# Patient Record
Sex: MALE | Race: WHITE | Employment: OTHER | URBAN - METROPOLITAN AREA
[De-identification: names, ages, dates, MRNs, and addresses within clinical notes are randomized per-mention and may not be internally consistent; named-entity substitution may affect disease eponyms.]

---

## 2018-03-21 ENCOUNTER — HOSPITAL ENCOUNTER (INPATIENT)
Age: 83
LOS: 6 days | Discharge: ANOTHER HOSPITAL | DRG: 481 | End: 2018-03-27
Attending: EMERGENCY MEDICINE | Admitting: INTERNAL MEDICINE
Payer: MEDICARE

## 2018-03-21 ENCOUNTER — APPOINTMENT (OUTPATIENT)
Dept: GENERAL RADIOLOGY | Age: 83
DRG: 481 | End: 2018-03-21
Payer: MEDICARE

## 2018-03-21 DIAGNOSIS — S72.002A CLOSED FRACTURE OF LEFT HIP, INITIAL ENCOUNTER (HCC): Primary | ICD-10-CM

## 2018-03-21 LAB
ABO/RH: NORMAL
ANION GAP SERPL CALCULATED.3IONS-SCNC: 13 MMOL/L (ref 7–16)
ANTIBODY SCREEN: NORMAL
APTT: 37.4 SEC (ref 24.5–35.1)
BASOPHILS ABSOLUTE: 0.03 E9/L (ref 0–0.2)
BASOPHILS RELATIVE PERCENT: 0.4 % (ref 0–2)
BILIRUBIN URINE: NEGATIVE
BLOOD, URINE: NEGATIVE
BUN BLDV-MCNC: 39 MG/DL (ref 8–23)
CALCIUM SERPL-MCNC: 9.3 MG/DL (ref 8.6–10.2)
CHLORIDE BLD-SCNC: 100 MMOL/L (ref 98–107)
CLARITY: CLEAR
CO2: 25 MMOL/L (ref 22–29)
COLOR: YELLOW
CREAT SERPL-MCNC: 1.6 MG/DL (ref 0.7–1.2)
EOSINOPHILS ABSOLUTE: 0.11 E9/L (ref 0.05–0.5)
EOSINOPHILS RELATIVE PERCENT: 1.6 % (ref 0–6)
GFR AFRICAN AMERICAN: 49
GFR NON-AFRICAN AMERICAN: 41 ML/MIN/1.73
GLUCOSE BLD-MCNC: 181 MG/DL (ref 74–109)
GLUCOSE URINE: NEGATIVE MG/DL
HCT VFR BLD CALC: 41.7 % (ref 37–54)
HEMOGLOBIN: 13.7 G/DL (ref 12.5–16.5)
IMMATURE GRANULOCYTES #: 0.1 E9/L
IMMATURE GRANULOCYTES %: 1.5 % (ref 0–5)
INR BLD: 2.5
KETONES, URINE: NEGATIVE MG/DL
LEUKOCYTE ESTERASE, URINE: NEGATIVE
LYMPHOCYTES ABSOLUTE: 1.49 E9/L (ref 1.5–4)
LYMPHOCYTES RELATIVE PERCENT: 21.8 % (ref 20–42)
MCH RBC QN AUTO: 29 PG (ref 26–35)
MCHC RBC AUTO-ENTMCNC: 32.9 % (ref 32–34.5)
MCV RBC AUTO: 88.3 FL (ref 80–99.9)
MONOCYTES ABSOLUTE: 0.99 E9/L (ref 0.1–0.95)
MONOCYTES RELATIVE PERCENT: 14.5 % (ref 2–12)
NEUTROPHILS ABSOLUTE: 4.12 E9/L (ref 1.8–7.3)
NEUTROPHILS RELATIVE PERCENT: 60.2 % (ref 43–80)
NITRITE, URINE: NEGATIVE
PDW BLD-RTO: 13.9 FL (ref 11.5–15)
PH UA: 6 (ref 5–9)
PLATELET # BLD: 160 E9/L (ref 130–450)
PMV BLD AUTO: 11.5 FL (ref 7–12)
POTASSIUM SERPL-SCNC: 4.1 MMOL/L (ref 3.5–5)
PROTEIN UA: NEGATIVE MG/DL
PROTHROMBIN TIME: 28.1 SEC (ref 9.3–12.4)
RBC # BLD: 4.72 E12/L (ref 3.8–5.8)
SODIUM BLD-SCNC: 138 MMOL/L (ref 132–146)
SPECIFIC GRAVITY UA: 1.01 (ref 1–1.03)
UROBILINOGEN, URINE: 0.2 E.U./DL
WBC # BLD: 6.8 E9/L (ref 4.5–11.5)

## 2018-03-21 PROCEDURE — 36415 COLL VENOUS BLD VENIPUNCTURE: CPT

## 2018-03-21 PROCEDURE — 73502 X-RAY EXAM HIP UNI 2-3 VIEWS: CPT

## 2018-03-21 PROCEDURE — 81003 URINALYSIS AUTO W/O SCOPE: CPT

## 2018-03-21 PROCEDURE — 1200000000 HC SEMI PRIVATE

## 2018-03-21 PROCEDURE — 96375 TX/PRO/DX INJ NEW DRUG ADDON: CPT

## 2018-03-21 PROCEDURE — 80048 BASIC METABOLIC PNL TOTAL CA: CPT

## 2018-03-21 PROCEDURE — 86901 BLOOD TYPING SEROLOGIC RH(D): CPT

## 2018-03-21 PROCEDURE — 73501 X-RAY EXAM HIP UNI 1 VIEW: CPT

## 2018-03-21 PROCEDURE — 99284 EMERGENCY DEPT VISIT MOD MDM: CPT

## 2018-03-21 PROCEDURE — 93005 ELECTROCARDIOGRAM TRACING: CPT | Performed by: STUDENT IN AN ORGANIZED HEALTH CARE EDUCATION/TRAINING PROGRAM

## 2018-03-21 PROCEDURE — 6360000002 HC RX W HCPCS: Performed by: STUDENT IN AN ORGANIZED HEALTH CARE EDUCATION/TRAINING PROGRAM

## 2018-03-21 PROCEDURE — 96365 THER/PROPH/DIAG IV INF INIT: CPT

## 2018-03-21 PROCEDURE — 85730 THROMBOPLASTIN TIME PARTIAL: CPT

## 2018-03-21 PROCEDURE — 2580000003 HC RX 258: Performed by: STUDENT IN AN ORGANIZED HEALTH CARE EDUCATION/TRAINING PROGRAM

## 2018-03-21 PROCEDURE — 85610 PROTHROMBIN TIME: CPT

## 2018-03-21 PROCEDURE — 87088 URINE BACTERIA CULTURE: CPT

## 2018-03-21 PROCEDURE — 71045 X-RAY EXAM CHEST 1 VIEW: CPT

## 2018-03-21 PROCEDURE — 85025 COMPLETE CBC W/AUTO DIFF WBC: CPT

## 2018-03-21 PROCEDURE — 86850 RBC ANTIBODY SCREEN: CPT

## 2018-03-21 PROCEDURE — 86900 BLOOD TYPING SEROLOGIC ABO: CPT

## 2018-03-21 RX ORDER — FUROSEMIDE 40 MG/1
40 TABLET ORAL DAILY
COMMUNITY

## 2018-03-21 RX ORDER — GLIPIZIDE 2.5 MG/1
5 TABLET, EXTENDED RELEASE ORAL DAILY
Status: ON HOLD | COMMUNITY
End: 2018-06-30

## 2018-03-21 RX ORDER — NIFEDIPINE 30 MG/1
30 TABLET, FILM COATED, EXTENDED RELEASE ORAL DAILY
Status: ON HOLD | COMMUNITY
End: 2018-03-28 | Stop reason: HOSPADM

## 2018-03-21 RX ORDER — MORPHINE SULFATE 10 MG/ML
4 INJECTION, SOLUTION INTRAMUSCULAR; INTRAVENOUS
Status: DISCONTINUED | OUTPATIENT
Start: 2018-03-21 | End: 2018-03-22

## 2018-03-21 RX ADMIN — MORPHINE SULFATE 4 MG: 10 INJECTION INTRAVENOUS at 21:38

## 2018-03-21 RX ADMIN — PHYTONADIONE 10 MG: 10 INJECTION, EMULSION INTRAMUSCULAR; INTRAVENOUS; SUBCUTANEOUS at 22:34

## 2018-03-21 ASSESSMENT — PAIN SCALES - GENERAL
PAINLEVEL_OUTOF10: 7
PAINLEVEL_OUTOF10: 8

## 2018-03-21 ASSESSMENT — PAIN DESCRIPTION - PAIN TYPE: TYPE: ACUTE PAIN

## 2018-03-21 ASSESSMENT — PAIN DESCRIPTION - DESCRIPTORS: DESCRIPTORS: ACHING

## 2018-03-21 ASSESSMENT — PAIN DESCRIPTION - ORIENTATION: ORIENTATION: LEFT

## 2018-03-21 ASSESSMENT — PAIN DESCRIPTION - LOCATION: LOCATION: HIP

## 2018-03-21 NOTE — ED PROVIDER NOTES
ED Attending  CC: Ellen     Department of Emergency Medicine   ED  Provider Note  Admit Date/RoomTime: 3/21/2018  7:25 PM  ED Room: 25/25  Chief Complaint   Hip Pain (fell today and hurt left hip.)    History of Present Illness   Source of history provided by:  patient. History/Exam Limitations: none. Diane Bennett is a 80 y.o. old male who has a past medical history of:   Past Medical History:   Diagnosis Date    A-fib [facility])     Arthritis     CAD (coronary artery disease)     [facility] ([facility] [address] [facility]     Depression     Hyperlipidemia     Hypertension     Thyroid disease       presents to the emergency department by private vehicle, for a fall which occured just prior to arrival. He was reportedly walking in his bathroom when he got his slipper caught on the carpet while at home prior to incident with complaints of right hip/groin pain. The patients tetanus status is within past 5 years. Since onset the symptoms have been moderate in degree. His pain is aggraveated by movement, use and palpation and relieved by nothing. He denies any head injury, loss of consciousness, neck pain, chest pain, abdominal pain, numbness or weakness. The patient does not believe that he has never seen an orthopedic surgeon before. The patient is on Coumadin anticoagulation therapy. ROS    Pertinent positives and negatives are stated within HPI, all other systems reviewed and are negative. Past Surgical History:   Procedure Laterality Date    HERNIA REPAIR      KNEE ARTHROSCOPY Left 7-1-13    debridement and medial menisectomy    PROSTATE SURGERY     Social History:  reports that he has quit smoking. He has never used smokeless tobacco. He reports that he does not drink alcohol or use drugs. Family History: family history is not on file. Allergies: Patient has no known allergies.     Physical Exam   Oxygen Saturation Interpretation: Normal.  ED Triage Vitals [03/21/18 1925]   BP Temp Temp Source Pulse Resp SpO2 Height Weight   -- 98.6 °F (37 °C) Oral 82 16 95 % 6' 1\" (1.854 m) 197 lb (89.4 kg)     Physical Exam  · Constitutional:  Alert, development consistent with age. · HEENT:  NC/NT. Airway patent. · Neck:  No midline or paravertebral tenderness. Normal ROM. Supple. · Chest:  Symmetrical without visible rash or tenderness. · Respiratory:  Lungs Clear to auscultation and breath sounds equal.  · CV:  Regular rate and rhythm, normal heart sounds, without pathological murmurs, ectopy, gallops, or rubs. · GI:  Abdomen Soft, nontender, good bowel sounds. No firm or pulsatile mass. · Pelvis:  Stable, palpation elicits pain of the left hip. · Back:  No midline or paravertebral tenderness. No costovertebral tenderness. · Extremities: Severe tenderness to palpation of the left hip. The left lower extremity is shortened and externally rotated. · Integument:  Normal turgor. Warm, dry, without visible rash, unless noted elsewhere. · Lymphatic: no lymphadenopathy noted  · Neurological:  Oriented x3, GCS 15. Motor functions intact.      Lab / Imaging Results   (All laboratory and radiology results have been personally reviewed by myself)  Labs:  Results for orders placed or performed during the hospital encounter of 03/21/18   CBC Auto Differential   Result Value Ref Range    WBC 6.8 4.5 - 11.5 E9/L    RBC 4.72 3.80 - 5.80 E12/L    Hemoglobin 13.7 12.5 - 16.5 g/dL    Hematocrit 41.7 37.0 - 54.0 %    MCV 88.3 80.0 - 99.9 fL    MCH 29.0 26.0 - 35.0 pg    MCHC 32.9 32.0 - 34.5 %    RDW 13.9 11.5 - 15.0 fL    Platelets 660 879 - 179 E9/L    MPV 11.5 7.0 - 12.0 fL    Neutrophils % 60.2 43.0 - 80.0 %    Immature Granulocytes % 1.5 0.0 - 5.0 %    Lymphocytes % 21.8 20.0 - 42.0 %    Monocytes % 14.5 (H) 2.0 - 12.0 %    Eosinophils % 1.6 0.0 - 6.0 %    Basophils % 0.4 0.0 - 2.0 %    Neutrophils # 4.12 1.80 - 7.30 E9/L    Immature Granulocytes # 0.10 E9/L    Lymphocytes # 1.49 (L) 1.50 - 4.00 E9/L    Monocytes # 0.99 (H) 0.10 - 0.95 accuracy; however, inadvertent computerized transcription errors may be present.   END OF ED PROVIDER NOTE        Emory Linares, DO  03/21/18 [id number] [facility], DO  03/21/18 9921

## 2018-03-21 NOTE — ED NOTES
Bed: 25  Expected date:   Expected time:   Means of arrival:   Comments:  Errol Baker RN  03/21/18 1925

## 2018-03-22 ENCOUNTER — APPOINTMENT (OUTPATIENT)
Dept: GENERAL RADIOLOGY | Age: 83
DRG: 481 | End: 2018-03-22
Payer: MEDICARE

## 2018-03-22 ENCOUNTER — ANESTHESIA (OUTPATIENT)
Dept: OPERATING ROOM | Age: 83
DRG: 481 | End: 2018-03-22
Payer: MEDICARE

## 2018-03-22 ENCOUNTER — ANESTHESIA EVENT (OUTPATIENT)
Dept: OPERATING ROOM | Age: 83
DRG: 481 | End: 2018-03-22
Payer: MEDICARE

## 2018-03-22 VITALS
RESPIRATION RATE: 35 BRPM | OXYGEN SATURATION: 99 % | TEMPERATURE: 97.5 F | SYSTOLIC BLOOD PRESSURE: 99 MMHG | DIASTOLIC BLOOD PRESSURE: 71 MMHG

## 2018-03-22 LAB
ALBUMIN SERPL-MCNC: 4.1 G/DL (ref 3.5–5.2)
ALP BLD-CCNC: 82 U/L (ref 40–129)
ALT SERPL-CCNC: 11 U/L (ref 0–40)
ANION GAP SERPL CALCULATED.3IONS-SCNC: 14 MMOL/L (ref 7–16)
AST SERPL-CCNC: 16 U/L (ref 0–39)
BASOPHILS ABSOLUTE: 0.03 E9/L (ref 0–0.2)
BASOPHILS RELATIVE PERCENT: 0.3 % (ref 0–2)
BILIRUB SERPL-MCNC: 0.6 MG/DL (ref 0–1.2)
BUN BLDV-MCNC: 37 MG/DL (ref 8–23)
CALCIUM SERPL-MCNC: 9.2 MG/DL (ref 8.6–10.2)
CHLORIDE BLD-SCNC: 100 MMOL/L (ref 98–107)
CK MB: 1.6 NG/ML (ref 0–7.7)
CO2: 24 MMOL/L (ref 22–29)
CREAT SERPL-MCNC: 1.4 MG/DL (ref 0.7–1.2)
EKG ATRIAL RATE: 105 BPM
EKG Q-T INTERVAL: 362 MS
EKG QRS DURATION: 82 MS
EKG QTC CALCULATION (BAZETT): 452 MS
EKG R AXIS: 34 DEGREES
EKG T AXIS: 35 DEGREES
EKG VENTRICULAR RATE: 94 BPM
EOSINOPHILS ABSOLUTE: 0.01 E9/L (ref 0.05–0.5)
EOSINOPHILS RELATIVE PERCENT: 0.1 % (ref 0–6)
GFR AFRICAN AMERICAN: 57
GFR NON-AFRICAN AMERICAN: 47 ML/MIN/1.73
GLUCOSE BLD-MCNC: 233 MG/DL (ref 74–109)
HCT VFR BLD CALC: 37.8 % (ref 37–54)
HEMOGLOBIN: 12.4 G/DL (ref 12.5–16.5)
IMMATURE GRANULOCYTES #: 0.15 E9/L
IMMATURE GRANULOCYTES %: 1.3 % (ref 0–5)
INR BLD: 1.2
INR BLD: 1.7
LYMPHOCYTES ABSOLUTE: 1.24 E9/L (ref 1.5–4)
LYMPHOCYTES RELATIVE PERCENT: 10.5 % (ref 20–42)
MCH RBC QN AUTO: 29.2 PG (ref 26–35)
MCHC RBC AUTO-ENTMCNC: 32.8 % (ref 32–34.5)
MCV RBC AUTO: 89.2 FL (ref 80–99.9)
METER GLUCOSE: 175 MG/DL (ref 70–110)
METER GLUCOSE: 186 MG/DL (ref 70–110)
METER GLUCOSE: 192 MG/DL (ref 70–110)
METER GLUCOSE: 206 MG/DL (ref 70–110)
METER GLUCOSE: 241 MG/DL (ref 70–110)
MONOCYTES ABSOLUTE: 1.49 E9/L (ref 0.1–0.95)
MONOCYTES RELATIVE PERCENT: 12.6 % (ref 2–12)
NEUTROPHILS ABSOLUTE: 8.89 E9/L (ref 1.8–7.3)
NEUTROPHILS RELATIVE PERCENT: 75.2 % (ref 43–80)
PDW BLD-RTO: 14.2 FL (ref 11.5–15)
PLATELET # BLD: 154 E9/L (ref 130–450)
PMV BLD AUTO: 12.3 FL (ref 7–12)
POTASSIUM SERPL-SCNC: 4.2 MMOL/L (ref 3.5–5)
PROTHROMBIN TIME: 14 SEC (ref 9.3–12.4)
PROTHROMBIN TIME: 19 SEC (ref 9.3–12.4)
RBC # BLD: 4.24 E12/L (ref 3.8–5.8)
SODIUM BLD-SCNC: 138 MMOL/L (ref 132–146)
TOTAL CK: 101 U/L (ref 20–200)
TOTAL PROTEIN: 7.1 G/DL (ref 6.4–8.3)
TROPONIN: <0.01 NG/ML (ref 0–0.03)
WBC # BLD: 11.8 E9/L (ref 4.5–11.5)

## 2018-03-22 PROCEDURE — 82553 CREATINE MB FRACTION: CPT

## 2018-03-22 PROCEDURE — 82962 GLUCOSE BLOOD TEST: CPT

## 2018-03-22 PROCEDURE — 2580000003 HC RX 258: Performed by: NURSE ANESTHETIST, CERTIFIED REGISTERED

## 2018-03-22 PROCEDURE — 6360000002 HC RX W HCPCS

## 2018-03-22 PROCEDURE — 3209999900 FLUORO FOR SURGICAL PROCEDURES

## 2018-03-22 PROCEDURE — 6360000002 HC RX W HCPCS: Performed by: ANESTHESIOLOGY

## 2018-03-22 PROCEDURE — 2580000003 HC RX 258: Performed by: INTERNAL MEDICINE

## 2018-03-22 PROCEDURE — 2500000003 HC RX 250 WO HCPCS: Performed by: NURSE ANESTHETIST, CERTIFIED REGISTERED

## 2018-03-22 PROCEDURE — 3600000012 HC SURGERY LEVEL 2 ADDTL 15MIN: Performed by: ORTHOPAEDIC SURGERY

## 2018-03-22 PROCEDURE — 6370000000 HC RX 637 (ALT 250 FOR IP): Performed by: INTERNAL MEDICINE

## 2018-03-22 PROCEDURE — 3700000001 HC ADD 15 MINUTES (ANESTHESIA): Performed by: ORTHOPAEDIC SURGERY

## 2018-03-22 PROCEDURE — 0QS736Z REPOSITION LEFT UPPER FEMUR WITH INTRAMEDULLARY INTERNAL FIXATION DEVICE, PERCUTANEOUS APPROACH: ICD-10-PCS | Performed by: ORTHOPAEDIC SURGERY

## 2018-03-22 PROCEDURE — 36415 COLL VENOUS BLD VENIPUNCTURE: CPT

## 2018-03-22 PROCEDURE — 85025 COMPLETE CBC W/AUTO DIFF WBC: CPT

## 2018-03-22 PROCEDURE — 85610 PROTHROMBIN TIME: CPT

## 2018-03-22 PROCEDURE — 2580000003 HC RX 258: Performed by: ORTHOPAEDIC SURGERY

## 2018-03-22 PROCEDURE — C1769 GUIDE WIRE: HCPCS | Performed by: ORTHOPAEDIC SURGERY

## 2018-03-22 PROCEDURE — 84484 ASSAY OF TROPONIN QUANT: CPT

## 2018-03-22 PROCEDURE — 93005 ELECTROCARDIOGRAM TRACING: CPT | Performed by: INTERNAL MEDICINE

## 2018-03-22 PROCEDURE — 6360000002 HC RX W HCPCS: Performed by: STUDENT IN AN ORGANIZED HEALTH CARE EDUCATION/TRAINING PROGRAM

## 2018-03-22 PROCEDURE — 82550 ASSAY OF CK (CPK): CPT

## 2018-03-22 PROCEDURE — 2500000003 HC RX 250 WO HCPCS

## 2018-03-22 PROCEDURE — 2720000010 HC SURG SUPPLY STERILE: Performed by: ORTHOPAEDIC SURGERY

## 2018-03-22 PROCEDURE — 6370000000 HC RX 637 (ALT 250 FOR IP): Performed by: ORTHOPAEDIC SURGERY

## 2018-03-22 PROCEDURE — 3700000000 HC ANESTHESIA ATTENDED CARE: Performed by: ORTHOPAEDIC SURGERY

## 2018-03-22 PROCEDURE — 3600000002 HC SURGERY LEVEL 2 BASE: Performed by: ORTHOPAEDIC SURGERY

## 2018-03-22 PROCEDURE — 1200000000 HC SEMI PRIVATE

## 2018-03-22 PROCEDURE — C1713 ANCHOR/SCREW BN/BN,TIS/BN: HCPCS | Performed by: ORTHOPAEDIC SURGERY

## 2018-03-22 PROCEDURE — 7100000001 HC PACU RECOVERY - ADDTL 15 MIN: Performed by: ORTHOPAEDIC SURGERY

## 2018-03-22 PROCEDURE — 2580000003 HC RX 258: Performed by: STUDENT IN AN ORGANIZED HEALTH CARE EDUCATION/TRAINING PROGRAM

## 2018-03-22 PROCEDURE — 6360000002 HC RX W HCPCS: Performed by: NURSE ANESTHETIST, CERTIFIED REGISTERED

## 2018-03-22 PROCEDURE — 7100000000 HC PACU RECOVERY - FIRST 15 MIN: Performed by: ORTHOPAEDIC SURGERY

## 2018-03-22 PROCEDURE — 80053 COMPREHEN METABOLIC PANEL: CPT

## 2018-03-22 PROCEDURE — 6360000002 HC RX W HCPCS: Performed by: ORTHOPAEDIC SURGERY

## 2018-03-22 DEVICE — SCREW BNE L40MM DIA5MM ST TIB LT GRN TI ST CANN LOK FULL: Type: IMPLANTABLE DEVICE | Site: HIP | Status: FUNCTIONAL

## 2018-03-22 DEVICE — IMPLANTABLE DEVICE: Type: IMPLANTABLE DEVICE | Site: HIP | Status: FUNCTIONAL

## 2018-03-22 DEVICE — NAIL IM L170MM DIA10MM NK 125DEG SHT PROX FEM GRN TI-15MO: Type: IMPLANTABLE DEVICE | Site: HIP | Status: FUNCTIONAL

## 2018-03-22 RX ORDER — ONDANSETRON 2 MG/ML
4 INJECTION INTRAMUSCULAR; INTRAVENOUS
Status: DISCONTINUED | OUTPATIENT
Start: 2018-03-22 | End: 2018-03-22

## 2018-03-22 RX ORDER — HYDROCODONE BITARTRATE AND ACETAMINOPHEN 5; 325 MG/1; MG/1
1 TABLET ORAL EVERY 6 HOURS PRN
Qty: 28 TABLET | Refills: 0 | Status: SHIPPED | OUTPATIENT
Start: 2018-03-22 | End: 2018-03-29

## 2018-03-22 RX ORDER — ROCURONIUM BROMIDE 10 MG/ML
INJECTION, SOLUTION INTRAVENOUS PRN
Status: DISCONTINUED | OUTPATIENT
Start: 2018-03-22 | End: 2018-03-22 | Stop reason: SDUPTHER

## 2018-03-22 RX ORDER — SODIUM CHLORIDE 0.9 % (FLUSH) 0.9 %
10 SYRINGE (ML) INJECTION EVERY 12 HOURS SCHEDULED
Status: DISCONTINUED | OUTPATIENT
Start: 2018-03-22 | End: 2018-03-24

## 2018-03-22 RX ORDER — PROPOFOL 10 MG/ML
INJECTION, EMULSION INTRAVENOUS PRN
Status: DISCONTINUED | OUTPATIENT
Start: 2018-03-22 | End: 2018-03-22 | Stop reason: SDUPTHER

## 2018-03-22 RX ORDER — ONDANSETRON 2 MG/ML
4 INJECTION INTRAMUSCULAR; INTRAVENOUS EVERY 6 HOURS PRN
Status: DISCONTINUED | OUTPATIENT
Start: 2018-03-22 | End: 2018-03-27 | Stop reason: HOSPADM

## 2018-03-22 RX ORDER — SODIUM CHLORIDE 0.9 % (FLUSH) 0.9 %
10 SYRINGE (ML) INJECTION PRN
Status: DISCONTINUED | OUTPATIENT
Start: 2018-03-22 | End: 2018-03-24

## 2018-03-22 RX ORDER — GLYCOPYRROLATE 0.6MG/3ML
SYRINGE (ML) INTRAVENOUS PRN
Status: DISCONTINUED | OUTPATIENT
Start: 2018-03-22 | End: 2018-03-22 | Stop reason: SDUPTHER

## 2018-03-22 RX ORDER — ACETAMINOPHEN 325 MG/1
650 TABLET ORAL EVERY 4 HOURS PRN
Status: DISCONTINUED | OUTPATIENT
Start: 2018-03-22 | End: 2018-03-24

## 2018-03-22 RX ORDER — TAMSULOSIN HYDROCHLORIDE 0.4 MG/1
0.4 CAPSULE ORAL DAILY
Status: DISCONTINUED | OUTPATIENT
Start: 2018-03-22 | End: 2018-03-24

## 2018-03-22 RX ORDER — PROMETHAZINE HYDROCHLORIDE 25 MG/ML
6.25 INJECTION, SOLUTION INTRAMUSCULAR; INTRAVENOUS
Status: DISCONTINUED | OUTPATIENT
Start: 2018-03-22 | End: 2018-03-22

## 2018-03-22 RX ORDER — SIMVASTATIN 40 MG
40 TABLET ORAL NIGHTLY
Status: DISCONTINUED | OUTPATIENT
Start: 2018-03-22 | End: 2018-03-27 | Stop reason: HOSPADM

## 2018-03-22 RX ORDER — MEPERIDINE HYDROCHLORIDE 25 MG/ML
12.5 INJECTION INTRAMUSCULAR; INTRAVENOUS; SUBCUTANEOUS EVERY 5 MIN PRN
Status: DISCONTINUED | OUTPATIENT
Start: 2018-03-22 | End: 2018-03-22

## 2018-03-22 RX ORDER — SODIUM CHLORIDE 9 MG/ML
INJECTION, SOLUTION INTRAVENOUS CONTINUOUS PRN
Status: DISCONTINUED | OUTPATIENT
Start: 2018-03-22 | End: 2018-03-22 | Stop reason: SDUPTHER

## 2018-03-22 RX ORDER — MORPHINE SULFATE 2 MG/ML
2 INJECTION, SOLUTION INTRAMUSCULAR; INTRAVENOUS EVERY 5 MIN PRN
Status: DISCONTINUED | OUTPATIENT
Start: 2018-03-22 | End: 2018-03-22

## 2018-03-22 RX ORDER — HYDROMORPHONE HCL 110MG/55ML
0.5 PATIENT CONTROLLED ANALGESIA SYRINGE INTRAVENOUS EVERY 5 MIN PRN
Status: DISCONTINUED | OUTPATIENT
Start: 2018-03-22 | End: 2018-03-22

## 2018-03-22 RX ORDER — MORPHINE SULFATE 2 MG/ML
2 INJECTION, SOLUTION INTRAMUSCULAR; INTRAVENOUS
Status: DISCONTINUED | OUTPATIENT
Start: 2018-03-22 | End: 2018-03-23

## 2018-03-22 RX ORDER — HYDROCODONE BITARTRATE AND ACETAMINOPHEN 5; 325 MG/1; MG/1
2 TABLET ORAL EVERY 4 HOURS PRN
Status: DISCONTINUED | OUTPATIENT
Start: 2018-03-22 | End: 2018-03-24

## 2018-03-22 RX ORDER — LEVOTHYROXINE SODIUM 0.1 MG/1
100 TABLET ORAL DAILY
Status: DISCONTINUED | OUTPATIENT
Start: 2018-03-22 | End: 2018-03-27 | Stop reason: HOSPADM

## 2018-03-22 RX ORDER — DEXTROSE AND SODIUM CHLORIDE 5; .45 G/100ML; G/100ML
INJECTION, SOLUTION INTRAVENOUS CONTINUOUS
Status: DISCONTINUED | OUTPATIENT
Start: 2018-03-22 | End: 2018-03-24

## 2018-03-22 RX ORDER — ACETAMINOPHEN 325 MG/1
650 TABLET ORAL EVERY 4 HOURS PRN
Status: DISCONTINUED | OUTPATIENT
Start: 2018-03-22 | End: 2018-03-27 | Stop reason: HOSPADM

## 2018-03-22 RX ORDER — MORPHINE SULFATE 2 MG/ML
4 INJECTION, SOLUTION INTRAMUSCULAR; INTRAVENOUS
Status: DISCONTINUED | OUTPATIENT
Start: 2018-03-22 | End: 2018-03-23

## 2018-03-22 RX ORDER — NIFEDIPINE 30 MG/1
30 TABLET, EXTENDED RELEASE ORAL DAILY
Status: DISCONTINUED | OUTPATIENT
Start: 2018-03-22 | End: 2018-03-27 | Stop reason: HOSPADM

## 2018-03-22 RX ORDER — DOCUSATE SODIUM 100 MG/1
100 CAPSULE, LIQUID FILLED ORAL 2 TIMES DAILY
Status: DISCONTINUED | OUTPATIENT
Start: 2018-03-22 | End: 2018-03-27 | Stop reason: HOSPADM

## 2018-03-22 RX ORDER — MORPHINE SULFATE 2 MG/ML
INJECTION, SOLUTION INTRAMUSCULAR; INTRAVENOUS
Status: COMPLETED
Start: 2018-03-22 | End: 2018-03-22

## 2018-03-22 RX ORDER — FUROSEMIDE 40 MG/1
40 TABLET ORAL DAILY
Status: DISCONTINUED | OUTPATIENT
Start: 2018-03-22 | End: 2018-03-27 | Stop reason: HOSPADM

## 2018-03-22 RX ORDER — LIDOCAINE HYDROCHLORIDE 20 MG/ML
INJECTION, SOLUTION INFILTRATION; PERINEURAL PRN
Status: DISCONTINUED | OUTPATIENT
Start: 2018-03-22 | End: 2018-03-22 | Stop reason: SDUPTHER

## 2018-03-22 RX ORDER — FINASTERIDE 5 MG/1
5 TABLET, FILM COATED ORAL DAILY
Status: DISCONTINUED | OUTPATIENT
Start: 2018-03-22 | End: 2018-03-27 | Stop reason: HOSPADM

## 2018-03-22 RX ORDER — ATENOLOL 50 MG/1
50 TABLET ORAL NIGHTLY
Status: DISCONTINUED | OUTPATIENT
Start: 2018-03-22 | End: 2018-03-23

## 2018-03-22 RX ORDER — HYDROCODONE BITARTRATE AND ACETAMINOPHEN 5; 325 MG/1; MG/1
1 TABLET ORAL EVERY 4 HOURS PRN
Status: DISCONTINUED | OUTPATIENT
Start: 2018-03-22 | End: 2018-03-27 | Stop reason: HOSPADM

## 2018-03-22 RX ORDER — FENTANYL CITRATE 50 UG/ML
INJECTION, SOLUTION INTRAMUSCULAR; INTRAVENOUS
Status: COMPLETED
Start: 2018-03-22 | End: 2018-03-22

## 2018-03-22 RX ORDER — FENTANYL CITRATE 0.05 MG/ML
INJECTION, SOLUTION INTRAMUSCULAR; INTRAVENOUS PRN
Status: DISCONTINUED | OUTPATIENT
Start: 2018-03-22 | End: 2018-03-22 | Stop reason: SDUPTHER

## 2018-03-22 RX ORDER — MORPHINE SULFATE 2 MG/ML
INJECTION, SOLUTION INTRAMUSCULAR; INTRAVENOUS
Status: DISPENSED
Start: 2018-03-22 | End: 2018-03-23

## 2018-03-22 RX ADMIN — HYDROCODONE BITARTRATE AND ACETAMINOPHEN 1 TABLET: 5; 325 TABLET ORAL at 21:26

## 2018-03-22 RX ADMIN — PHENYLEPHRINE HYDROCHLORIDE 200 MCG: 10 INJECTION INTRAMUSCULAR; INTRAVENOUS; SUBCUTANEOUS at 15:10

## 2018-03-22 RX ADMIN — VITAMIN D, TAB 1000IU (100/BT) 1000 UNITS: 25 TAB at 09:58

## 2018-03-22 RX ADMIN — WATER 2 G: 1 INJECTION INTRAMUSCULAR; INTRAVENOUS; SUBCUTANEOUS at 21:25

## 2018-03-22 RX ADMIN — SIMVASTATIN 40 MG: 40 TABLET, FILM COATED ORAL at 21:26

## 2018-03-22 RX ADMIN — FINASTERIDE 5 MG: 5 TABLET, FILM COATED ORAL at 09:57

## 2018-03-22 RX ADMIN — PHENYLEPHRINE HYDROCHLORIDE 200 MCG: 10 INJECTION INTRAMUSCULAR; INTRAVENOUS; SUBCUTANEOUS at 15:30

## 2018-03-22 RX ADMIN — LIDOCAINE HYDROCHLORIDE 50 MG: 20 INJECTION, SOLUTION INFILTRATION; PERINEURAL at 13:44

## 2018-03-22 RX ADMIN — ATENOLOL 50 MG: 50 TABLET ORAL at 21:26

## 2018-03-22 RX ADMIN — PHENYLEPHRINE HYDROCHLORIDE 200 MCG: 10 INJECTION INTRAMUSCULAR; INTRAVENOUS; SUBCUTANEOUS at 14:17

## 2018-03-22 RX ADMIN — WATER 2 G: 1 INJECTION INTRAMUSCULAR; INTRAVENOUS; SUBCUTANEOUS at 13:39

## 2018-03-22 RX ADMIN — LEVOTHYROXINE SODIUM 100 MCG: 100 TABLET ORAL at 08:07

## 2018-03-22 RX ADMIN — SODIUM CHLORIDE: 9 INJECTION, SOLUTION INTRAVENOUS at 14:45

## 2018-03-22 RX ADMIN — Medication 10 ML: at 21:28

## 2018-03-22 RX ADMIN — ROCURONIUM BROMIDE 35 MG: 10 INJECTION, SOLUTION INTRAVENOUS at 13:44

## 2018-03-22 RX ADMIN — FENTANYL CITRATE 50 MCG: 0.05 INJECTION, SOLUTION INTRAMUSCULAR; INTRAVENOUS at 13:39

## 2018-03-22 RX ADMIN — PHENYLEPHRINE HYDROCHLORIDE 100 MCG: 10 INJECTION INTRAMUSCULAR; INTRAVENOUS; SUBCUTANEOUS at 13:50

## 2018-03-22 RX ADMIN — NIFEDIPINE 30 MG: 30 TABLET, FILM COATED, EXTENDED RELEASE ORAL at 09:57

## 2018-03-22 RX ADMIN — PHENYLEPHRINE HYDROCHLORIDE 200 MCG: 10 INJECTION INTRAMUSCULAR; INTRAVENOUS; SUBCUTANEOUS at 14:30

## 2018-03-22 RX ADMIN — Medication 0.4 MG: at 15:28

## 2018-03-22 RX ADMIN — FENTANYL CITRATE 50 MCG: 0.05 INJECTION, SOLUTION INTRAMUSCULAR; INTRAVENOUS at 14:55

## 2018-03-22 RX ADMIN — SODIUM CHLORIDE: 9 INJECTION, SOLUTION INTRAVENOUS at 13:39

## 2018-03-22 RX ADMIN — PROPOFOL 100 MG: 10 INJECTION, EMULSION INTRAVENOUS at 13:44

## 2018-03-22 RX ADMIN — PHENYLEPHRINE HYDROCHLORIDE 200 MCG: 10 INJECTION INTRAMUSCULAR; INTRAVENOUS; SUBCUTANEOUS at 14:25

## 2018-03-22 RX ADMIN — PHENYLEPHRINE HYDROCHLORIDE 200 MCG: 10 INJECTION INTRAMUSCULAR; INTRAVENOUS; SUBCUTANEOUS at 15:20

## 2018-03-22 RX ADMIN — INSULIN LISPRO 1 UNITS: 100 INJECTION, SOLUTION INTRAVENOUS; SUBCUTANEOUS at 11:26

## 2018-03-22 RX ADMIN — FENTANYL CITRATE 50 MCG: 0.05 INJECTION, SOLUTION INTRAMUSCULAR; INTRAVENOUS at 15:35

## 2018-03-22 RX ADMIN — PHENYLEPHRINE HYDROCHLORIDE 200 MCG: 10 INJECTION INTRAMUSCULAR; INTRAVENOUS; SUBCUTANEOUS at 15:16

## 2018-03-22 RX ADMIN — PHENYLEPHRINE HYDROCHLORIDE 100 MCG: 10 INJECTION INTRAMUSCULAR; INTRAVENOUS; SUBCUTANEOUS at 14:20

## 2018-03-22 RX ADMIN — INSULIN LISPRO 1 UNITS: 100 INJECTION, SOLUTION INTRAVENOUS; SUBCUTANEOUS at 21:28

## 2018-03-22 RX ADMIN — DEXTROSE AND SODIUM CHLORIDE: 5; 450 INJECTION, SOLUTION INTRAVENOUS at 02:54

## 2018-03-22 RX ADMIN — ROCURONIUM BROMIDE 15 MG: 10 INJECTION, SOLUTION INTRAVENOUS at 14:35

## 2018-03-22 RX ADMIN — INSULIN LISPRO 1 UNITS: 100 INJECTION, SOLUTION INTRAVENOUS; SUBCUTANEOUS at 02:58

## 2018-03-22 RX ADMIN — PHENYLEPHRINE HYDROCHLORIDE 200 MCG: 10 INJECTION INTRAMUSCULAR; INTRAVENOUS; SUBCUTANEOUS at 13:52

## 2018-03-22 RX ADMIN — FENTANYL CITRATE 50 MCG: 0.05 INJECTION, SOLUTION INTRAMUSCULAR; INTRAVENOUS at 13:44

## 2018-03-22 RX ADMIN — DOCUSATE SODIUM 100 MG: 100 CAPSULE, LIQUID FILLED ORAL at 21:25

## 2018-03-22 RX ADMIN — MORPHINE SULFATE 2 MG: 2 INJECTION, SOLUTION INTRAMUSCULAR; INTRAVENOUS at 16:10

## 2018-03-22 RX ADMIN — MORPHINE SULFATE 2 MG: 2 INJECTION, SOLUTION INTRAMUSCULAR; INTRAVENOUS at 16:05

## 2018-03-22 RX ADMIN — PHENYLEPHRINE HYDROCHLORIDE 200 MCG: 10 INJECTION INTRAMUSCULAR; INTRAVENOUS; SUBCUTANEOUS at 15:25

## 2018-03-22 RX ADMIN — INSULIN LISPRO 2 UNITS: 100 INJECTION, SOLUTION INTRAVENOUS; SUBCUTANEOUS at 07:12

## 2018-03-22 RX ADMIN — PHENYLEPHRINE HYDROCHLORIDE 200 MCG: 10 INJECTION INTRAMUSCULAR; INTRAVENOUS; SUBCUTANEOUS at 15:35

## 2018-03-22 RX ADMIN — MORPHINE SULFATE 2 MG: 2 INJECTION, SOLUTION INTRAMUSCULAR; INTRAVENOUS at 16:34

## 2018-03-22 RX ADMIN — FUROSEMIDE 40 MG: 40 TABLET ORAL at 09:57

## 2018-03-22 RX ADMIN — NEOSTIGMINE METHYLSULFATE 3 MG: 1 INJECTION, SOLUTION INTRAMUSCULAR; INTRAVENOUS; SUBCUTANEOUS at 15:28

## 2018-03-22 RX ADMIN — TAMSULOSIN HYDROCHLORIDE 0.4 MG: 0.4 CAPSULE ORAL at 09:57

## 2018-03-22 RX ADMIN — INSULIN LISPRO 2 UNITS: 100 INJECTION, SOLUTION INTRAVENOUS; SUBCUTANEOUS at 17:33

## 2018-03-22 RX ADMIN — PHENYLEPHRINE HYDROCHLORIDE 200 MCG: 10 INJECTION INTRAMUSCULAR; INTRAVENOUS; SUBCUTANEOUS at 15:04

## 2018-03-22 RX ADMIN — PHENYLEPHRINE HYDROCHLORIDE 200 MCG: 10 INJECTION INTRAMUSCULAR; INTRAVENOUS; SUBCUTANEOUS at 13:55

## 2018-03-22 RX ADMIN — PHENYLEPHRINE HYDROCHLORIDE 200 MCG: 10 INJECTION INTRAMUSCULAR; INTRAVENOUS; SUBCUTANEOUS at 14:35

## 2018-03-22 RX ADMIN — MORPHINE SULFATE 2 MG: 2 INJECTION, SOLUTION INTRAMUSCULAR; INTRAVENOUS at 16:46

## 2018-03-22 ASSESSMENT — PAIN SCALES - GENERAL
PAINLEVEL_OUTOF10: 4
PAINLEVEL_OUTOF10: 0
PAINLEVEL_OUTOF10: 4
PAINLEVEL_OUTOF10: 10
PAINLEVEL_OUTOF10: 5
PAINLEVEL_OUTOF10: 0
PAINLEVEL_OUTOF10: 6
PAINLEVEL_OUTOF10: 0
PAINLEVEL_OUTOF10: 6

## 2018-03-22 ASSESSMENT — PULMONARY FUNCTION TESTS
PIF_VALUE: 11
PIF_VALUE: 11
PIF_VALUE: 6
PIF_VALUE: 12
PIF_VALUE: 12
PIF_VALUE: 4
PIF_VALUE: 12
PIF_VALUE: 11
PIF_VALUE: 12
PIF_VALUE: 11
PIF_VALUE: 11
PIF_VALUE: 14
PIF_VALUE: 11
PIF_VALUE: 12
PIF_VALUE: 13
PIF_VALUE: 12
PIF_VALUE: 1
PIF_VALUE: 5
PIF_VALUE: 14
PIF_VALUE: 0
PIF_VALUE: 28
PIF_VALUE: 1
PIF_VALUE: 15
PIF_VALUE: 12
PIF_VALUE: 4
PIF_VALUE: 15
PIF_VALUE: 12
PIF_VALUE: 11
PIF_VALUE: 14
PIF_VALUE: 12
PIF_VALUE: 4
PIF_VALUE: 12
PIF_VALUE: 11
PIF_VALUE: 12
PIF_VALUE: 13
PIF_VALUE: 12
PIF_VALUE: 12
PIF_VALUE: 1
PIF_VALUE: 11
PIF_VALUE: 12
PIF_VALUE: 19
PIF_VALUE: 15
PIF_VALUE: 12
PIF_VALUE: 12
PIF_VALUE: 11
PIF_VALUE: 1
PIF_VALUE: 12
PIF_VALUE: 14
PIF_VALUE: 12
PIF_VALUE: 6
PIF_VALUE: 11
PIF_VALUE: 1
PIF_VALUE: 11
PIF_VALUE: 1
PIF_VALUE: 12
PIF_VALUE: 12
PIF_VALUE: 11
PIF_VALUE: 4
PIF_VALUE: 2
PIF_VALUE: 3
PIF_VALUE: 1
PIF_VALUE: 5
PIF_VALUE: 12
PIF_VALUE: 3
PIF_VALUE: 2
PIF_VALUE: 13
PIF_VALUE: 12
PIF_VALUE: 12
PIF_VALUE: 4
PIF_VALUE: 12
PIF_VALUE: 12
PIF_VALUE: 28
PIF_VALUE: 4
PIF_VALUE: 5
PIF_VALUE: 12
PIF_VALUE: 3
PIF_VALUE: 14
PIF_VALUE: 6
PIF_VALUE: 12
PIF_VALUE: 13
PIF_VALUE: 12
PIF_VALUE: 13
PIF_VALUE: 1
PIF_VALUE: 29
PIF_VALUE: 11
PIF_VALUE: 0
PIF_VALUE: 12
PIF_VALUE: 11
PIF_VALUE: 12
PIF_VALUE: 0
PIF_VALUE: 11
PIF_VALUE: 15
PIF_VALUE: 12
PIF_VALUE: 12
PIF_VALUE: 11
PIF_VALUE: 5
PIF_VALUE: 12
PIF_VALUE: 17
PIF_VALUE: 12
PIF_VALUE: 1
PIF_VALUE: 11
PIF_VALUE: 12
PIF_VALUE: 0
PIF_VALUE: 3
PIF_VALUE: 12
PIF_VALUE: 11
PIF_VALUE: 2
PIF_VALUE: 27
PIF_VALUE: 12

## 2018-03-22 ASSESSMENT — PAIN DESCRIPTION - PAIN TYPE
TYPE: SURGICAL PAIN
TYPE: ACUTE PAIN
TYPE: SURGICAL PAIN

## 2018-03-22 ASSESSMENT — PAIN DESCRIPTION - LOCATION
LOCATION: HIP

## 2018-03-22 ASSESSMENT — PAIN DESCRIPTION - DESCRIPTORS
DESCRIPTORS: ACHING;BURNING
DESCRIPTORS: ACHING;BURNING
DESCRIPTORS: ACHING
DESCRIPTORS: ACHING;DISCOMFORT;DULL
DESCRIPTORS: ACHING;BURNING

## 2018-03-22 ASSESSMENT — PAIN DESCRIPTION - FREQUENCY
FREQUENCY: INTERMITTENT
FREQUENCY: CONTINUOUS

## 2018-03-22 ASSESSMENT — PAIN DESCRIPTION - PROGRESSION
CLINICAL_PROGRESSION: NOT CHANGED
CLINICAL_PROGRESSION: NOT CHANGED
CLINICAL_PROGRESSION: GRADUALLY IMPROVING
CLINICAL_PROGRESSION: NOT CHANGED

## 2018-03-22 ASSESSMENT — PAIN DESCRIPTION - ORIENTATION
ORIENTATION: LEFT

## 2018-03-22 ASSESSMENT — LIFESTYLE VARIABLES: SMOKING_STATUS: 0

## 2018-03-22 NOTE — ANESTHESIA PRE PROCEDURE
Component Value Date     03/22/2018    K 4.2 03/22/2018     03/22/2018    CO2 24 03/22/2018    BUN 37 03/22/2018    CREATININE 1.4 03/22/2018    GFRAA 57 03/22/2018    LABGLOM 47 03/22/2018    GLUCOSE 233 03/22/2018    PROT 7.1 03/22/2018    CALCIUM 9.2 03/22/2018    BILITOT 0.6 03/22/2018    ALKPHOS 82 03/22/2018    AST 16 03/22/2018    ALT 11 03/22/2018       POC Tests: No results for input(s): POCGLU, POCNA, POCK, POCCL, POCBUN, POCHEMO, POCHCT in the last 72 hours. Coags:   Lab Results   Component Value Date    PROTIME 19.0 03/22/2018    INR 1.7 03/22/2018    APTT 37.4 03/21/2018       HCG (If Applicable): No results found for: PREGTESTUR, PREGSERUM, HCG, HCGQUANT     ABGs: No results found for: PHART, PO2ART, ZTF4IVY, HJL8YSL, BEART, Y1YSSNZR     Type & Screen (If Applicable):  No results found for: LABABO, [address]    Anesthesia Evaluation  Patient summary reviewed and Nursing notes reviewed no history of anesthetic complications:   Airway: Mallampati: II  TM distance: >3 FB   Neck ROM: full  Mouth opening: > = 3 FB Dental:    (+) edentulous      Pulmonary:Negative Pulmonary ROS breath sounds clear to auscultation      (-) not a current smoker                           Cardiovascular:    (+) hypertension:, CAD:, dysrhythmias: atrial fibrillation,       ECG reviewed  Rhythm: regular  Rate: normal           Beta Blocker:  Dose within 24 Hrs         Neuro/Psych:   (+) psychiatric history:depression/anxiety             GI/Hepatic/Renal:   (+) GERD:, morbid obesity          Endo/Other:    (+) hypothyroidism, blood dyscrasia: anticoagulation therapy:., .                 Abdominal:         (-) obese     Vascular:                                    Anesthesia Plan      general     ASA 3     (Elevated INR precludes the use of spinal anesthesia at this time)      MIPS: Postoperative opioids intended and Prophylactic antiemetics administered.   Anesthetic plan and risks discussed with patient. DOS STAFF ADDENDUM:    Pt seen and examined, chart reviewed (including anesthesia, drug and allergy history). Anesthetic plan, risks, benefits, alternatives, and personnel involved discussed with patient. Patient verbalized an understanding and agrees to proceed. Plan discussed with care team members and agreed upon.     Eliazar Barajas MD  Staff Anesthesiologist  1:07 PM      Eliazar Barajas MD   3/22/2018

## 2018-03-22 NOTE — PROGRESS NOTES
Admission packet including Condition H, Rights & Responsibilities and Quiet Time program left at bedside. Patient was sleeping.

## 2018-03-22 NOTE — CONSULTS
vomiting  GENITOURINARY:  negative for frequency, urinary incontinence  HEMATOLOGIC/LYMPHATIC:  negative for bleeding and petechiae  MUSCULOSKELETAL:  positive for  pain  NEUROLOGICAL:  negative for headaches, dizziness  BEHAVIOR/PSYCH:  negative for increased agitation and anxiety    PHYSICAL EXAM:    VITALS:  Pulse 82   Temp 98.6 °F (37 °C) (Oral)   Resp 16   Ht 6' 1\" (1.854 m)   Wt 197 lb (89.4 kg)   SpO2 95%   BMI 25.99 kg/m²   CONSTITUTIONAL:  awake, alert, cooperative, no apparent distress, and appears stated age  MUSCULOSKELETAL:  Left lower Extremity:  Shortened and externally rotated  +Log roll  + Heel Strike  -TTP over Knee and Ankle  Skin intact with no signs of degloving  Compartments soft and compressible, calf non-tender  +DP & PT pulses, Brisk Cap refill, Toes warm and perfused  Sensation grossly intact superficial/deep peroneal,saphenous,sural,tibial n. distributions  · +GS/TA/EHL. Secondary Exam:   B/L UE: No obvious signs of trauma. -TTP to fingers, hand, wrist, forearm, elbow, humerus, shoulder or clavicle. · rightLE: No obvious signs of trauma. -TTP to foot, ankle, leg, knee, thigh, hip.       · Pelvis: -TTP, -Log roll, -Heel strike     DATA:    CBC:   Lab Results   Component Value Date    WBC 6.8 03/21/2018    RBC 4.72 03/21/2018    HGB 13.7 03/21/2018    HCT 41.7 03/21/2018    MCV 88.3 03/21/2018    MCH 29.0 03/21/2018    MCHC 32.9 03/21/2018    RDW 13.9 03/21/2018     03/21/2018    MPV 11.5 03/21/2018     PT/INR:    Lab Results   Component Value Date    PROTIME 28.1 03/21/2018    INR 2.5 03/21/2018     Lactic Acid : No results found for: [address]    Radiology Review:  03/21/18 - XR L Hip and Femur  Showing an acute displaced comminuted in varus angulated and shortened intertrochanteric fracture    IMPRESSION:   · Closed, Left Intertrochanteric Fracture    PLAN:  Plan for surgery with Dr. Etelvina Holland on 3/21  NPO After midnight, Needs medical assessment for surgery  LLE

## 2018-03-22 NOTE — ED NOTES
Patient sleeping in room at this time. Call light within reach. Daughter went home for the night.       Tami Alston RN  03/21/18 [id number]

## 2018-03-23 PROBLEM — I25.10 CORONARY ATHEROSCLEROSIS: Status: ACTIVE | Noted: 2018-03-23

## 2018-03-23 PROBLEM — I63.239 OCCLUSION AND STENOSIS OF CAROTID ARTERY WITH CEREBRAL INFARCTION: Status: ACTIVE | Noted: 2018-03-23

## 2018-03-23 LAB
ALBUMIN SERPL-MCNC: 3.5 G/DL (ref 3.5–5.2)
ALP BLD-CCNC: 63 U/L (ref 40–129)
ALT SERPL-CCNC: 8 U/L (ref 0–40)
ANION GAP SERPL CALCULATED.3IONS-SCNC: 12 MMOL/L (ref 7–16)
AST SERPL-CCNC: 19 U/L (ref 0–39)
BASOPHILS ABSOLUTE: 0 E9/L (ref 0–0.2)
BASOPHILS RELATIVE PERCENT: 0.1 % (ref 0–2)
BILIRUB SERPL-MCNC: 0.6 MG/DL (ref 0–1.2)
BUN BLDV-MCNC: 38 MG/DL (ref 8–23)
CALCIUM SERPL-MCNC: 8.6 MG/DL (ref 8.6–10.2)
CHLORIDE BLD-SCNC: 102 MMOL/L (ref 98–107)
CK MB: 2.1 NG/ML (ref 0–7.7)
CO2: 25 MMOL/L (ref 22–29)
CREAT SERPL-MCNC: 1.7 MG/DL (ref 0.7–1.2)
EOSINOPHILS ABSOLUTE: 0 E9/L (ref 0.05–0.5)
EOSINOPHILS RELATIVE PERCENT: 0 % (ref 0–6)
GFR AFRICAN AMERICAN: 46
GFR NON-AFRICAN AMERICAN: 38 ML/MIN/1.73
GLUCOSE BLD-MCNC: 190 MG/DL (ref 74–109)
HCT VFR BLD CALC: 31.7 % (ref 37–54)
HEMOGLOBIN: 10.2 G/DL (ref 12.5–16.5)
INR BLD: 1.3
LYMPHOCYTES ABSOLUTE: 1.41 E9/L (ref 1.5–4)
LYMPHOCYTES RELATIVE PERCENT: 6.5 % (ref 20–42)
MCH RBC QN AUTO: 29.2 PG (ref 26–35)
MCHC RBC AUTO-ENTMCNC: 32.2 % (ref 32–34.5)
MCV RBC AUTO: 90.8 FL (ref 80–99.9)
METAMYELOCYTES RELATIVE PERCENT: 1 % (ref 0–1)
METER GLUCOSE: 194 MG/DL (ref 70–110)
METER GLUCOSE: 223 MG/DL (ref 70–110)
METER GLUCOSE: 225 MG/DL (ref 70–110)
METER GLUCOSE: 260 MG/DL (ref 70–110)
METER GLUCOSE: 299 MG/DL (ref 70–110)
METER GLUCOSE: 310 MG/DL (ref 70–110)
MONOCYTES ABSOLUTE: 1.21 E9/L (ref 0.1–0.95)
MONOCYTES RELATIVE PERCENT: 5.5 % (ref 2–12)
MYELOCYTE PERCENT: 1.5 % (ref 0–0)
NEUTROPHILS ABSOLUTE: 17.78 E9/L (ref 1.8–7.3)
NEUTROPHILS RELATIVE PERCENT: 85.4 % (ref 43–80)
OVALOCYTES: ABNORMAL
PDW BLD-RTO: 14.4 FL (ref 11.5–15)
PLATELET # BLD: 119 E9/L (ref 130–450)
PMV BLD AUTO: 11.3 FL (ref 7–12)
POIKILOCYTES: ABNORMAL
POTASSIUM SERPL-SCNC: 5.2 MMOL/L (ref 3.5–5)
PROTHROMBIN TIME: 14.9 SEC (ref 9.3–12.4)
RBC # BLD: 3.49 E12/L (ref 3.8–5.8)
SODIUM BLD-SCNC: 139 MMOL/L (ref 132–146)
TOTAL CK: 317 U/L (ref 20–200)
TOTAL PROTEIN: 6.5 G/DL (ref 6.4–8.3)
TROPONIN: <0.01 NG/ML (ref 0–0.03)
WBC # BLD: 20.2 E9/L (ref 4.5–11.5)

## 2018-03-23 PROCEDURE — G8988 SELF CARE GOAL STATUS: HCPCS

## 2018-03-23 PROCEDURE — 6370000000 HC RX 637 (ALT 250 FOR IP): Performed by: INTERNAL MEDICINE

## 2018-03-23 PROCEDURE — 6360000002 HC RX W HCPCS: Performed by: INTERNAL MEDICINE

## 2018-03-23 PROCEDURE — 97530 THERAPEUTIC ACTIVITIES: CPT | Performed by: PHYSICAL THERAPIST

## 2018-03-23 PROCEDURE — 85025 COMPLETE CBC W/AUTO DIFF WBC: CPT

## 2018-03-23 PROCEDURE — 2580000003 HC RX 258: Performed by: INTERNAL MEDICINE

## 2018-03-23 PROCEDURE — 97166 OT EVAL MOD COMPLEX 45 MIN: CPT

## 2018-03-23 PROCEDURE — 36415 COLL VENOUS BLD VENIPUNCTURE: CPT

## 2018-03-23 PROCEDURE — 82962 GLUCOSE BLOOD TEST: CPT

## 2018-03-23 PROCEDURE — 82553 CREATINE MB FRACTION: CPT

## 2018-03-23 PROCEDURE — 82550 ASSAY OF CK (CPK): CPT

## 2018-03-23 PROCEDURE — 80053 COMPREHEN METABOLIC PANEL: CPT

## 2018-03-23 PROCEDURE — 97535 SELF CARE MNGMENT TRAINING: CPT

## 2018-03-23 PROCEDURE — 6360000002 HC RX W HCPCS: Performed by: ORTHOPAEDIC SURGERY

## 2018-03-23 PROCEDURE — 85610 PROTHROMBIN TIME: CPT

## 2018-03-23 PROCEDURE — 97162 PT EVAL MOD COMPLEX 30 MIN: CPT | Performed by: PHYSICAL THERAPIST

## 2018-03-23 PROCEDURE — 6370000000 HC RX 637 (ALT 250 FOR IP): Performed by: ORTHOPAEDIC SURGERY

## 2018-03-23 PROCEDURE — 84484 ASSAY OF TROPONIN QUANT: CPT

## 2018-03-23 PROCEDURE — G8987 SELF CARE CURRENT STATUS: HCPCS

## 2018-03-23 PROCEDURE — 2580000003 HC RX 258: Performed by: ORTHOPAEDIC SURGERY

## 2018-03-23 PROCEDURE — 1200000000 HC SEMI PRIVATE

## 2018-03-23 PROCEDURE — 93005 ELECTROCARDIOGRAM TRACING: CPT | Performed by: INTERNAL MEDICINE

## 2018-03-23 RX ORDER — DIGOXIN 0.25 MG/ML
250 INJECTION INTRAMUSCULAR; INTRAVENOUS ONCE
Status: COMPLETED | OUTPATIENT
Start: 2018-03-23 | End: 2018-03-23

## 2018-03-23 RX ORDER — WARFARIN SODIUM 4 MG/1
4 TABLET ORAL DAILY
Status: DISCONTINUED | OUTPATIENT
Start: 2018-03-23 | End: 2018-03-24

## 2018-03-23 RX ORDER — FEBUXOSTAT 40 MG/1
40 TABLET, FILM COATED ORAL DAILY
Status: DISCONTINUED | OUTPATIENT
Start: 2018-03-23 | End: 2018-03-27 | Stop reason: HOSPADM

## 2018-03-23 RX ORDER — ATENOLOL 25 MG/1
25 TABLET ORAL ONCE
Status: COMPLETED | OUTPATIENT
Start: 2018-03-23 | End: 2018-03-23

## 2018-03-23 RX ORDER — WARFARIN SODIUM 5 MG/1
5 TABLET ORAL DAILY
Status: DISCONTINUED | OUTPATIENT
Start: 2018-03-23 | End: 2018-03-23 | Stop reason: SDUPTHER

## 2018-03-23 RX ORDER — GLIPIZIDE 2.5 MG/1
2.5 TABLET, EXTENDED RELEASE ORAL DAILY
Status: DISCONTINUED | OUTPATIENT
Start: 2018-03-23 | End: 2018-03-25

## 2018-03-23 RX ADMIN — ATENOLOL 25 MG: 25 TABLET ORAL at 03:14

## 2018-03-23 RX ADMIN — NIFEDIPINE 30 MG: 30 TABLET, FILM COATED, EXTENDED RELEASE ORAL at 09:06

## 2018-03-23 RX ADMIN — INSULIN LISPRO 3 UNITS: 100 INJECTION, SOLUTION INTRAVENOUS; SUBCUTANEOUS at 05:06

## 2018-03-23 RX ADMIN — INSULIN LISPRO 2 UNITS: 100 INJECTION, SOLUTION INTRAVENOUS; SUBCUTANEOUS at 21:16

## 2018-03-23 RX ADMIN — LINAGLIPTIN 5 MG: 5 TABLET, FILM COATED ORAL at 21:15

## 2018-03-23 RX ADMIN — VITAMIN D, TAB 1000IU (100/BT) 1000 UNITS: 25 TAB at 09:06

## 2018-03-23 RX ADMIN — INSULIN LISPRO 3 UNITS: 100 INJECTION, SOLUTION INTRAVENOUS; SUBCUTANEOUS at 17:11

## 2018-03-23 RX ADMIN — GLIPIZIDE 2.5 MG: 2.5 TABLET, FILM COATED, EXTENDED RELEASE ORAL at 21:16

## 2018-03-23 RX ADMIN — DIGOXIN 250 MCG: 250 INJECTION, SOLUTION INTRAMUSCULAR; INTRAVENOUS; PARENTERAL at 03:15

## 2018-03-23 RX ADMIN — HYDROCODONE BITARTRATE AND ACETAMINOPHEN 1 TABLET: 5; 325 TABLET ORAL at 01:58

## 2018-03-23 RX ADMIN — DOCUSATE SODIUM 100 MG: 100 CAPSULE, LIQUID FILLED ORAL at 21:16

## 2018-03-23 RX ADMIN — SIMVASTATIN 40 MG: 40 TABLET, FILM COATED ORAL at 21:15

## 2018-03-23 RX ADMIN — INSULIN LISPRO 4 UNITS: 100 INJECTION, SOLUTION INTRAVENOUS; SUBCUTANEOUS at 13:26

## 2018-03-23 RX ADMIN — DOCUSATE SODIUM 100 MG: 100 CAPSULE, LIQUID FILLED ORAL at 09:06

## 2018-03-23 RX ADMIN — TAMSULOSIN HYDROCHLORIDE 0.4 MG: 0.4 CAPSULE ORAL at 09:06

## 2018-03-23 RX ADMIN — WATER 2 G: 1 INJECTION INTRAMUSCULAR; INTRAVENOUS; SUBCUTANEOUS at 05:19

## 2018-03-23 RX ADMIN — DEXTROSE AND SODIUM CHLORIDE: 5; 450 INJECTION, SOLUTION INTRAVENOUS at 22:03

## 2018-03-23 RX ADMIN — HYDROCODONE BITARTRATE AND ACETAMINOPHEN 1 TABLET: 5; 325 TABLET ORAL at 13:23

## 2018-03-23 RX ADMIN — DEXTROSE AND SODIUM CHLORIDE: 5; 450 INJECTION, SOLUTION INTRAVENOUS at 09:05

## 2018-03-23 RX ADMIN — INSULIN LISPRO 1 UNITS: 100 INJECTION, SOLUTION INTRAVENOUS; SUBCUTANEOUS at 00:09

## 2018-03-23 RX ADMIN — INSULIN LISPRO 2 UNITS: 100 INJECTION, SOLUTION INTRAVENOUS; SUBCUTANEOUS at 09:17

## 2018-03-23 RX ADMIN — LEVOTHYROXINE SODIUM 100 MCG: 100 TABLET ORAL at 06:32

## 2018-03-23 RX ADMIN — WARFARIN SODIUM 4 MG: 4 TABLET ORAL at 18:36

## 2018-03-23 RX ADMIN — FUROSEMIDE 40 MG: 40 TABLET ORAL at 09:06

## 2018-03-23 RX ADMIN — ASPIRIN 325 MG: 325 TABLET, DELAYED RELEASE ORAL at 09:06

## 2018-03-23 RX ADMIN — ACETAMINOPHEN 650 MG: 325 TABLET, FILM COATED ORAL at 21:16

## 2018-03-23 RX ADMIN — FINASTERIDE 5 MG: 5 TABLET, FILM COATED ORAL at 09:06

## 2018-03-23 ASSESSMENT — PAIN DESCRIPTION - PAIN TYPE
TYPE: SURGICAL PAIN
TYPE: SURGICAL PAIN

## 2018-03-23 ASSESSMENT — PAIN SCALES - GENERAL
PAINLEVEL_OUTOF10: 8
PAINLEVEL_OUTOF10: 2
PAINLEVEL_OUTOF10: 0
PAINLEVEL_OUTOF10: 4
PAINLEVEL_OUTOF10: 7
PAINLEVEL_OUTOF10: 0
PAINLEVEL_OUTOF10: 4

## 2018-03-23 ASSESSMENT — PAIN DESCRIPTION - LOCATION
LOCATION: HIP
LOCATION: HIP

## 2018-03-23 ASSESSMENT — PAIN DESCRIPTION - DESCRIPTORS
DESCRIPTORS: ACHING
DESCRIPTORS: DISCOMFORT;JABBING;PRESSURE

## 2018-03-23 ASSESSMENT — PAIN DESCRIPTION - FREQUENCY: FREQUENCY: INTERMITTENT

## 2018-03-23 ASSESSMENT — PAIN DESCRIPTION - ORIENTATION: ORIENTATION: RIGHT

## 2018-03-23 NOTE — CARE COORDINATION
SS NOTE: SW met with pt's dtr, Joe Dhaliwals [phone]. SNF list shared. Dtr is requesting a SNF stay at [address] [place] [address]. Referral completed there today. Awaiting a decision. For a SNF stay pt will need a 3 day qualifying stay here ending 3/24, a signed N17, current PT/OT note and SW will need to complete the HENs. Suleman Paige. 3/23/2018.12:12 PM.

## 2018-03-23 NOTE — PROGRESS NOTES
Balance:   Sitting: good    Standing: poor    Endurance: poor   Patients Goal: rehab then home    Treatment:able to sit EOB , much pain in moving to  EOB or side to side , tolerating bed in chair  position , does best with distraction , difficulty concentrating , max assist for bathing and dressing , confused at times , [place] , bed alarm ON   The following skilled interventions were introduced during initial assessment:  bathing and dressing retraining, safety/fall prevention technique and cognitive stimulation  Assessment Summary: Performance Deficiencies include:  Decreased functional mobility:  generalized weakness  Decreased endurance:  poor stability/endurance  Decreased ADL status:  fair-poor UB dressing/bathing  Decreased safety awareness:  requires verbal cues for safe performance  Decreased sensation:  both LE's  Decreased cognition: yes   Decreased balance:yes   Decreased vision/visual deficit,poor visual perceptual motor ability:yes    Rehab Potential: good   Patient and/or family understands diagnosis, prognosis and plan of care: yes   Plan of care: Patient will be seen by OT 1-3 times per week  for therapeutic activity, bed mobility, functional transfers, functional mobility, safety, fall prevention, ADL re-training, balance and endurance activities,instruction and training in energy conservation principles, family/patient education until discharged. Time In: 1400   Time Code treatment minutes: 35  · Medium Complexity  · History: Expanded review of medical records and additional review of physical, cognitive, or psychosocial history related to current functional performance  · Exam: 3-5 performance deficits  · Assistance/Modification: Min/mod assistance or modifications required to perform tasks. May have comorbidities that affect occupational performance.     Electronically signed by Calli Jaramillo OT on 3/23/2018 at 2:38 PM    Calli Jaramillo OTR/L#[id number]

## 2018-03-23 NOTE — PROGRESS NOTES
P Quality Flow/Interdisciplinary Rounds Progress Note        Quality Flow Rounds held on March 23, 2018    Disciplines Attending:  Bedside Nurse, ,  and Nursing Unit Leadership    Jim Washington was admitted on 3/21/2018  7:25 PM    Anticipated Discharge Date:  Expected Discharge Date: 03/26/18    Disposition:    Yossi Score:  Yossi Scale Score: 17    Readmission Risk              Readmission Risk:        9.5       Age 72 or Greater:  1    Admitted from SNF or Requires Paid or Family Care:  0    Currently has CHF,COPD,ARF,CRI,or is on dialysis:  0    Takes more than 5 Prescription Medications:  4    Takes Digoxin,Insulin,Anticoagulants,Narcotics or ASA/Plavix:  [address] in Past 12 Months:  0    On Disability:  0    Patient Considers own Health:  2.5          Discussed patient goal for the day, patient clinical progression, and barriers to discharge.   The following Goal(s) of the Day/Commitment(s) have been identified:  Activity progression, PT/OT, L ORIF      Ethyl [place]  March 23, 2018

## 2018-03-24 LAB
ANION GAP SERPL CALCULATED.3IONS-SCNC: 9 MMOL/L (ref 7–16)
BASOPHILS ABSOLUTE: 0 E9/L (ref 0–0.2)
BASOPHILS RELATIVE PERCENT: 0.1 % (ref 0–2)
BUN BLDV-MCNC: 40 MG/DL (ref 8–23)
CALCIUM SERPL-MCNC: 8.6 MG/DL (ref 8.6–10.2)
CHLORIDE BLD-SCNC: 100 MMOL/L (ref 98–107)
CK MB: 3.2 NG/ML (ref 0–7.7)
CO2: 27 MMOL/L (ref 22–29)
CREAT SERPL-MCNC: 1.7 MG/DL (ref 0.7–1.2)
EOSINOPHILS ABSOLUTE: 0 E9/L (ref 0.05–0.5)
EOSINOPHILS RELATIVE PERCENT: 0.1 % (ref 0–6)
GFR AFRICAN AMERICAN: 46
GFR NON-AFRICAN AMERICAN: 38 ML/MIN/1.73
GLUCOSE BLD-MCNC: 174 MG/DL (ref 74–109)
HCT VFR BLD CALC: 26.6 % (ref 37–54)
HEMOGLOBIN: 8.6 G/DL (ref 12.5–16.5)
HYPOCHROMIA: ABNORMAL
INR BLD: 1.3
LYMPHOCYTES ABSOLUTE: 0.75 E9/L (ref 1.5–4)
LYMPHOCYTES RELATIVE PERCENT: 4.5 % (ref 20–42)
MCH RBC QN AUTO: 29.7 PG (ref 26–35)
MCHC RBC AUTO-ENTMCNC: 32.3 % (ref 32–34.5)
MCV RBC AUTO: 91.7 FL (ref 80–99.9)
METER GLUCOSE: 132 MG/DL (ref 70–110)
METER GLUCOSE: 179 MG/DL (ref 70–110)
METER GLUCOSE: 185 MG/DL (ref 70–110)
METER GLUCOSE: 188 MG/DL (ref 70–110)
MONOCYTES ABSOLUTE: 0.45 E9/L (ref 0.1–0.95)
MONOCYTES RELATIVE PERCENT: 3 % (ref 2–12)
MYELOCYTE PERCENT: 1 % (ref 0–0)
NEUTROPHILS ABSOLUTE: 13.95 E9/L (ref 1.8–7.3)
NEUTROPHILS RELATIVE PERCENT: 91.5 % (ref 43–80)
OVALOCYTES: ABNORMAL
PDW BLD-RTO: 14.2 FL (ref 11.5–15)
PLATELET # BLD: 112 E9/L (ref 130–450)
PMV BLD AUTO: 12 FL (ref 7–12)
POIKILOCYTES: ABNORMAL
POLYCHROMASIA: ABNORMAL
POTASSIUM SERPL-SCNC: 4.3 MMOL/L (ref 3.5–5)
PROTHROMBIN TIME: 14.3 SEC (ref 9.3–12.4)
RBC # BLD: 2.9 E12/L (ref 3.8–5.8)
SODIUM BLD-SCNC: 136 MMOL/L (ref 132–146)
TOTAL CK: 435 U/L (ref 20–200)
TROPONIN: <0.01 NG/ML (ref 0–0.03)
URINE CULTURE, ROUTINE: NORMAL
WBC # BLD: 15 E9/L (ref 4.5–11.5)

## 2018-03-24 PROCEDURE — 80048 BASIC METABOLIC PNL TOTAL CA: CPT

## 2018-03-24 PROCEDURE — 82550 ASSAY OF CK (CPK): CPT

## 2018-03-24 PROCEDURE — 6360000002 HC RX W HCPCS: Performed by: INTERNAL MEDICINE

## 2018-03-24 PROCEDURE — 6370000000 HC RX 637 (ALT 250 FOR IP): Performed by: INTERNAL MEDICINE

## 2018-03-24 PROCEDURE — 36415 COLL VENOUS BLD VENIPUNCTURE: CPT

## 2018-03-24 PROCEDURE — 82553 CREATINE MB FRACTION: CPT

## 2018-03-24 PROCEDURE — 51798 US URINE CAPACITY MEASURE: CPT

## 2018-03-24 PROCEDURE — 6370000000 HC RX 637 (ALT 250 FOR IP): Performed by: ORTHOPAEDIC SURGERY

## 2018-03-24 PROCEDURE — 85610 PROTHROMBIN TIME: CPT

## 2018-03-24 PROCEDURE — 82962 GLUCOSE BLOOD TEST: CPT

## 2018-03-24 PROCEDURE — 2580000003 HC RX 258: Performed by: INTERNAL MEDICINE

## 2018-03-24 PROCEDURE — 51702 INSERT TEMP BLADDER CATH: CPT

## 2018-03-24 PROCEDURE — 84484 ASSAY OF TROPONIN QUANT: CPT

## 2018-03-24 PROCEDURE — 85025 COMPLETE CBC W/AUTO DIFF WBC: CPT

## 2018-03-24 PROCEDURE — 97530 THERAPEUTIC ACTIVITIES: CPT

## 2018-03-24 PROCEDURE — 1200000000 HC SEMI PRIVATE

## 2018-03-24 RX ORDER — PANTOPRAZOLE SODIUM 40 MG/10ML
40 INJECTION, POWDER, LYOPHILIZED, FOR SOLUTION INTRAVENOUS DAILY
Status: DISCONTINUED | OUTPATIENT
Start: 2018-03-24 | End: 2018-03-24

## 2018-03-24 RX ORDER — CHLORPROMAZINE HYDROCHLORIDE 25 MG/1
25 TABLET, FILM COATED ORAL 4 TIMES DAILY PRN
Status: DISCONTINUED | OUTPATIENT
Start: 2018-03-24 | End: 2018-03-27 | Stop reason: HOSPADM

## 2018-03-24 RX ORDER — ATENOLOL 50 MG/1
50 TABLET ORAL NIGHTLY
Status: DISCONTINUED | OUTPATIENT
Start: 2018-03-24 | End: 2018-03-27 | Stop reason: HOSPADM

## 2018-03-24 RX ORDER — TAMSULOSIN HYDROCHLORIDE 0.4 MG/1
0.4 CAPSULE ORAL NIGHTLY
Status: DISCONTINUED | OUTPATIENT
Start: 2018-03-24 | End: 2018-03-27 | Stop reason: HOSPADM

## 2018-03-24 RX ORDER — OMEPRAZOLE 20 MG/1
20 CAPSULE, DELAYED RELEASE ORAL
Status: DISCONTINUED | OUTPATIENT
Start: 2018-03-24 | End: 2018-03-27 | Stop reason: HOSPADM

## 2018-03-24 RX ORDER — CHLORPROMAZINE HYDROCHLORIDE 25 MG/1
12.5 TABLET, FILM COATED ORAL ONCE
Status: COMPLETED | OUTPATIENT
Start: 2018-03-24 | End: 2018-03-24

## 2018-03-24 RX ADMIN — FUROSEMIDE 40 MG: 40 TABLET ORAL at 10:10

## 2018-03-24 RX ADMIN — INSULIN LISPRO 1 UNITS: 100 INJECTION, SOLUTION INTRAVENOUS; SUBCUTANEOUS at 10:03

## 2018-03-24 RX ADMIN — INSULIN LISPRO 1 UNITS: 100 INJECTION, SOLUTION INTRAVENOUS; SUBCUTANEOUS at 13:13

## 2018-03-24 RX ADMIN — TAMSULOSIN HYDROCHLORIDE 0.4 MG: 0.4 CAPSULE ORAL at 21:35

## 2018-03-24 RX ADMIN — CHLORPROMAZINE HYDROCHLORIDE 25 MG: 25 TABLET, SUGAR COATED ORAL at 21:52

## 2018-03-24 RX ADMIN — LINAGLIPTIN 5 MG: 5 TABLET, FILM COATED ORAL at 21:35

## 2018-03-24 RX ADMIN — LEVOTHYROXINE SODIUM 100 MCG: 100 TABLET ORAL at 05:43

## 2018-03-24 RX ADMIN — ASPIRIN 325 MG: 325 TABLET, DELAYED RELEASE ORAL at 10:08

## 2018-03-24 RX ADMIN — HYDROCODONE BITARTRATE AND ACETAMINOPHEN 1 TABLET: 5; 325 TABLET ORAL at 00:02

## 2018-03-24 RX ADMIN — CHLORPROMAZINE HYDROCHLORIDE 12.5 MG: 25 TABLET, SUGAR COATED ORAL at 15:44

## 2018-03-24 RX ADMIN — GLIPIZIDE 2.5 MG: 2.5 TABLET, FILM COATED, EXTENDED RELEASE ORAL at 10:13

## 2018-03-24 RX ADMIN — DEXTROSE AND SODIUM CHLORIDE: 5; 450 INJECTION, SOLUTION INTRAVENOUS at 11:45

## 2018-03-24 RX ADMIN — ENOXAPARIN SODIUM 90 MG: 100 INJECTION SUBCUTANEOUS at 15:43

## 2018-03-24 RX ADMIN — SIMVASTATIN 40 MG: 40 TABLET, FILM COATED ORAL at 21:35

## 2018-03-24 RX ADMIN — NIFEDIPINE 30 MG: 30 TABLET, FILM COATED, EXTENDED RELEASE ORAL at 10:09

## 2018-03-24 RX ADMIN — VITAMIN D, TAB 1000IU (100/BT) 1000 UNITS: 25 TAB at 10:09

## 2018-03-24 RX ADMIN — DOCUSATE SODIUM 100 MG: 100 CAPSULE, LIQUID FILLED ORAL at 10:08

## 2018-03-24 RX ADMIN — FINASTERIDE 5 MG: 5 TABLET, FILM COATED ORAL at 10:10

## 2018-03-24 RX ADMIN — INSULIN LISPRO 1 UNITS: 100 INJECTION, SOLUTION INTRAVENOUS; SUBCUTANEOUS at 18:24

## 2018-03-24 RX ADMIN — DOCUSATE SODIUM 100 MG: 100 CAPSULE, LIQUID FILLED ORAL at 21:34

## 2018-03-24 RX ADMIN — OMEPRAZOLE 20 MG: 20 CAPSULE, DELAYED RELEASE ORAL at 15:43

## 2018-03-24 ASSESSMENT — PAIN DESCRIPTION - DESCRIPTORS: DESCRIPTORS: ACHING

## 2018-03-24 ASSESSMENT — PAIN DESCRIPTION - LOCATION: LOCATION: HIP

## 2018-03-24 ASSESSMENT — PAIN SCALES - GENERAL
PAINLEVEL_OUTOF10: 7
PAINLEVEL_OUTOF10: 0
PAINLEVEL_OUTOF10: 0

## 2018-03-24 ASSESSMENT — PAIN DESCRIPTION - PAIN TYPE: TYPE: SURGICAL PAIN

## 2018-03-24 NOTE — CONSULTS
Take lowest dose possible to manage pain. 3/22/18 3/29/18 Yes Rosemary Cintron,    furosemide (LASIX) 40 MG tablet Take 40 mg by mouth daily   Yes Historical Provider, MD   glipiZIDE (GLUCOTROL XL) 2.5 MG extended release tablet Take 2.5 mg by mouth daily   Yes Historical Provider, MD   NIFEdipine (ADALAT CC) 30 MG extended release tablet Take 30 mg by mouth daily   Yes Historical Provider, MD   Cholecalciferol (VITAMIN D3) 1000 units CAPS Take 1 capsule by mouth daily   Yes Historical Provider, MD   linagliptin (TRADJENTA) 5 MG tablet Take 5 mg by mouth nightly   Yes Historical Provider, MD   febuxostat 40 MG TABS tablet Take 40 mg by mouth daily. Yes Historical Provider, MD   finasteride (PROSCAR) 5 MG tablet Take 5 mg by mouth daily. Yes Historical Provider, MD   levothyroxine (SYNTHROID) 100 MCG tablet Take 100 mcg by mouth Daily. Yes Historical Provider, MD   simvastatin (ZOCOR) 40 MG tablet Take 40 mg by mouth nightly. Yes Historical Provider, MD   tamsulosin (FLOMAX) 0.4 MG capsule Take 0.4 mg by mouth daily. Yes Historical Provider, MD   warfarin (COUMADIN) 5 MG tablet Take by mouth daily at 1800 5 mg- Sunday, Tuesday, and Thursday  4 mg- Monday, Wed, Friday, Saturday. Yes Historical Provider, MD   atenolol (TENORMIN) 50 MG tablet Take 50 mg by mouth nightly    Yes Historical Provider, MD   EPINEPHrine (EPIPEN 2-CICI) 0.3 MG/0.3ML SOAJ injection Inject 0.3 mLs into the muscle once for 1 dose Use as directed for allergic reaction 7/28/16 7/28/16  Madyson Black, DO       Allergies:  Patient has no known allergies. Review of Systems:   · Constitutional: there has been no unanticipated weight loss. There's been no significant change in energy level, sleep pattern or activity level. No fever chills or rigors. · Eyes: No visual changes or diplopia. No scleral icterus. · ENT: No Headaches, hearing loss or vertigo. No mouth sores or sore throat.  No change in taste or A1C: No components found for: HGBA1C   ECG:  See report    Radiology:  Xr Hip Left (1 View)    Result Date: 3/21/2018  Reading location:  200 CLINICAL STATEMENT: Fractured left hip A portable AP view of the hip with traction reveals again the comminuted intertrochanteric fracture of the left hip. The varus deformity has significantly improved. The fracture is slightly impacted. 1. Improvement in the varus deformity but slight impaction persists. Xr Hip Left (2-3 Views)    Addendum Date: 3/21/2018    Reading location:  200 CLINICAL STATEMENT addendum: The IMPRESSION should read comminuted intertrochanteric fracture of the left hip with varus deformity. Result Date: 3/21/2018  Reading location:  100 CLINICAL STATEMENT: Fall. Pain in left hip An AP view of the pelvis and frontal and frog-leg views of the left hip reveal a comminuted intertrochanteric fracture of the left hip with varus deformity. The underlying bones are osteoporotic. Comminuted intertrochanteric fracture of the right hip with varus deformity    Xr Chest 1 Vw    Result Date: 3/21/2018  Reading location:  200 CLINICAL STATEMENT: Preoperative assessment A single frontal view of the chest today is compared with the study of January 3, 2016 I see no change. The heart is enlarged but there is no failure. No infiltrates, effusion, mass or adenopathy. 1. No change since January 2016. 2. Cardiomegaly but no failure, mass, infiltrate or other abnormality. Fluoro For Surgical Procedures    Result Date: 3/22/2018  Reading location:  200 HISTORY: [Left hip fracture.] FLUOROSCOPY TIME: 2 minutes 24 seconds. FINDINGS: Intraoperative C-arm fluoroscopy was provided to orthopedic surgery. 3 images were obtained. A total of 25.82 mGy was recorded. Note is made of a short intramedullary bruno and dynamic nail within the [left hip.] There is anatomical alignment of the left hip.]     1.  Intraoperative C-arm fluoroscopy was provided to orthopedic

## 2018-03-24 NOTE — PROGRESS NOTES
Internal Medicine Progress Note    Sandy Kapadia. Manuel Agustin., & [address] Manuel Agustin., F.A.C.O.I. Noa Davidson D.O., F.DIVYA.C.O.I. Primary Care Physician: Keily Irvin MD   Admitting Physician:  Demetrius Arechiga DO  Admission date and time: 3/21/2018  7:25 PM    Room:  [address] [place] [address]  Admitting diagnosis: Hip fracture requiring operative repair, left, closed, initial encounter [facility]) [address] [facility]    Patient Name: Je Rojas  MRN: [id number]    Date of Service: 3/24/2018     Subjective: Uriel Peña is a 80 y. o.  male who was seen and examined today,3/24/2018, at the bedside. States that he has no pain in the left lower extremity. Has complaint of hiccups for the past 12 hours. Unable to eat as a result. Staff reported atrial fibrillation with RVR and cardiac pauses. Discussed with the patient and his family. Agreeable to cardiology evaluation. Melisa admits he has had issue with dizziness prior to admission. Wife, daughters and multiple other family members present during my examination. I reviewed the patient's past medical, surgical history and medication. I reviewed the  course of events since last visit. Review of System:  Constitutional:   Negative for fever and chills. Positive for hiccups. HEENT:   Negative for ear pain, sore throat, rhinorrhea and sinus pressure. Negative for eye pain, discharge and redness. Psch:   Denies depression or anxiety. Cardiovascular:   Denies any chest pain, irregular heartbeats, or palpitations. Respiratory:   Denies shortness of breath, coughing, sputum production, hemoptysis, or wheezing. Gastrointestinal:   Denies nausea, vomiting, diarrhea, or constipation. Denies any abdominal pain. Positive for persistent reflux. States he has a constant sour taste in his mouth. Decreased dietary intake due to hiccups. Extremities:   Denies any lower extremity swelling or edema.   Neurology:    Denies any headache or focal infiltrate or other abnormality. Fluoro For Surgical Procedures    Result Date: 3/22/2018  Reading location:  200 HISTORY: [Left hip fracture.] FLUOROSCOPY TIME: 2 minutes 24 seconds. FINDINGS: Intraoperative C-arm fluoroscopy was provided to orthopedic surgery. 3 images were obtained. A total of 25.82 mGy was recorded. Note is made of a short intramedullary bruno and dynamic nail within the [left hip.] There is anatomical alignment of the left hip.]     1. Intraoperative C-arm fluoroscopy was provided to orthopedic surgery. Physical Exam:  I/O this shift:  In: -   Out: 500 [Urine:500]    Intake/Output Summary (Last 24 hours) at 03/24/18 1346  Last data filed at 03/24/18 1221   Gross per 24 hour   Intake           996.25 ml   Output             1800 ml   Net          -803.75 ml   I/O last 3 completed shifts: In: 1116.3 [P.O.:420; I.V.:696.3]  Out: 1300 [Urine:1300]  Patient Vitals for the past 96 hrs (Last 3 readings):   Weight   03/22/18 0117 190 lb (86.2 kg)   03/21/18 1925 197 lb (89.4 kg)       Vital Signs:   Blood pressure (!) 96/54, pulse 101, temperature 98.6 °F (37 °C), temperature source Oral, resp. rate 18, height 6' 1\" (1.854 m), weight 190 lb (86.2 kg), SpO2 95 %. General appearance: Zoe Hansen is a 80 y. o.  male who is alert, responsive, oriented to person, place. Psych:   Behavior is normal. Mood appears normal. Speech is not rapid and/or pressured. HEENT:   PERRLA. EOMI. Sclera clear. Buccal mucosa moist. Impaired vision. Upper dentures. Neck:    Supple. Trachea midline. No thyromegaly. No JVD. No bruits. Heart:    Irregular rate and rhythm. Atrial fibrillation with cardiac pauses. No murmurs. Lungs:  Symmetrical. Clear to auscultation bilaterally but diminished. No wheezes. No rhonchi. No rales. Abdomen:   Soft. Non-tender. Non-distended. Bowel sounds positive. No organomegaly or masses. No pain on palpation. Extremities:    Peripheral pulses present. Trace lower leg edema. No ulcers. Dressings intact to the left hip. Sensation is intact and compartments are soft    Neurologic:    Alert. No focal deficit. Cranial nerves grossly intact. Skin:    No rashes or lesions. Surgical changes to the left hip. Wound Documentation:   Incision 03/22/18 Hip Left (Active)   Wound Assessment LAMONT 3/23/2018  8:30 AM   Sabrina-wound Assessment Intact 3/22/2018  4:43 PM   Closure Staples 3/22/2018  3:51 PM   Drainage Amount None 3/23/2018  8:30 AM   Dressing/Treatment Other (Comment) 3/23/2018  8:30 AM   Dressing Status Clean;Dry; Intact 3/23/2018  8:30 AM   Number of days: 1       Chronic Hospital Medical Problems:  Past Medical History:   Diagnosis Date    [facility] [address]     Arthritis     CAD (coronary artery disease)     Cancer ([facility] [address]     Depression     Hyperlipidemia     Hypertension     Thyroid disease      Active Problems:    Hip fracture requiring operative repair, left, closed[facility]  Resolved Problems:    * No resolved hospital problems. *      Assessment:  1. Closed L intertrochanteric fracture s/p closed reduction fluoroscopic guidance with short lock cephalo-medullary nail fixation performed on 3/22/2018 by Dr. Lincoln Flores. 2. Acute blood loss anemia. 3. Chronic atrial fibrillation with RVR and pauses  4. Hiccups  5. Essential hypertension  6. Hyperlipidemia  7. Hypothyroidism  8. Hyperlipidemia  9. Gastroesophageal reflux disease  10. Gout      Plan:   Patient was found to have greater than 2 second pauses on the monitor. Cardiology has been consulted for further cardiac evaluation. Will hold Coumadin and initiate on therapeutic Lovenox at this time for atrial fibrillation/VTE prophylaxis in the event the patient needs pacemaker implantation. Patient with complaint of persistent reflux s/p surgery and hiccups. Add Prilosec and thorazine to current medication regimen. PT/OT to treat. Monitor Hgb and transfuse as warranted.  Increase

## 2018-03-24 NOTE — CARE COORDINATION
SS NOTE: SW recd acceptance of pt for [place] [facility]. They relate that his bed will be available tomorrow (Sunday -3/25) if stable for transfer. Pt's dtr, Shanthi Pryor [phone] is aware of this plan. For a SNF stay pt will need a 3 day qualifying stay here ending 3/24, a signed N17, current PT/OT note and ANN completed the HENs. Darlene Paige. 3/24/2018.12:42 PM

## 2018-03-25 LAB
ABO/RH: NORMAL
ANION GAP SERPL CALCULATED.3IONS-SCNC: 13 MMOL/L (ref 7–16)
ANTIBODY SCREEN: NORMAL
BASOPHILS ABSOLUTE: 0 E9/L (ref 0–0.2)
BASOPHILS RELATIVE PERCENT: 0 % (ref 0–2)
BLOOD BANK DISPENSE STATUS: NORMAL
BLOOD BANK PRODUCT CODE: NORMAL
BPU ID: NORMAL
BUN BLDV-MCNC: 37 MG/DL (ref 8–23)
CALCIUM SERPL-MCNC: 8.6 MG/DL (ref 8.6–10.2)
CHLORIDE BLD-SCNC: 101 MMOL/L (ref 98–107)
CK MB: 2.7 NG/ML (ref 0–7.7)
CO2: 25 MMOL/L (ref 22–29)
CREAT SERPL-MCNC: 1.6 MG/DL (ref 0.7–1.2)
DESCRIPTION BLOOD BANK: NORMAL
EOSINOPHILS ABSOLUTE: 0.04 E9/L (ref 0.05–0.5)
EOSINOPHILS RELATIVE PERCENT: 0.5 % (ref 0–6)
GFR AFRICAN AMERICAN: 49
GFR NON-AFRICAN AMERICAN: 41 ML/MIN/1.73
GLUCOSE BLD-MCNC: 121 MG/DL (ref 74–109)
HCT VFR BLD CALC: 23.7 % (ref 37–54)
HEMOGLOBIN: 7.8 G/DL (ref 12.5–16.5)
HYPOCHROMIA: ABNORMAL
INR BLD: 1.1
LYMPHOCYTES ABSOLUTE: 0.77 E9/L (ref 1.5–4)
LYMPHOCYTES RELATIVE PERCENT: 9 % (ref 20–42)
MCH RBC QN AUTO: 29.4 PG (ref 26–35)
MCHC RBC AUTO-ENTMCNC: 32.9 % (ref 32–34.5)
MCV RBC AUTO: 89.4 FL (ref 80–99.9)
METER GLUCOSE: 131 MG/DL (ref 70–110)
METER GLUCOSE: 134 MG/DL (ref 70–110)
METER GLUCOSE: 210 MG/DL (ref 70–110)
METER GLUCOSE: 217 MG/DL (ref 70–110)
MONOCYTES ABSOLUTE: 0.52 E9/L (ref 0.1–0.95)
MONOCYTES RELATIVE PERCENT: 6 % (ref 2–12)
NEUTROPHILS ABSOLUTE: 7.31 E9/L (ref 1.8–7.3)
NEUTROPHILS RELATIVE PERCENT: 84.5 % (ref 43–80)
OVALOCYTES: ABNORMAL
PDW BLD-RTO: 14 FL (ref 11.5–15)
PLATELET # BLD: 107 E9/L (ref 130–450)
PMV BLD AUTO: 12.7 FL (ref 7–12)
POIKILOCYTES: ABNORMAL
POTASSIUM SERPL-SCNC: 3.7 MMOL/L (ref 3.5–5)
PROTHROMBIN TIME: 12.7 SEC (ref 9.3–12.4)
RBC # BLD: 2.65 E12/L (ref 3.8–5.8)
SODIUM BLD-SCNC: 139 MMOL/L (ref 132–146)
TOTAL CK: 410 U/L (ref 20–200)
TROPONIN: <0.01 NG/ML (ref 0–0.03)
WBC # BLD: 8.6 E9/L (ref 4.5–11.5)

## 2018-03-25 PROCEDURE — 86923 COMPATIBILITY TEST ELECTRIC: CPT

## 2018-03-25 PROCEDURE — 97116 GAIT TRAINING THERAPY: CPT

## 2018-03-25 PROCEDURE — 85610 PROTHROMBIN TIME: CPT

## 2018-03-25 PROCEDURE — 36415 COLL VENOUS BLD VENIPUNCTURE: CPT

## 2018-03-25 PROCEDURE — 80048 BASIC METABOLIC PNL TOTAL CA: CPT

## 2018-03-25 PROCEDURE — 86850 RBC ANTIBODY SCREEN: CPT

## 2018-03-25 PROCEDURE — 6370000000 HC RX 637 (ALT 250 FOR IP): Performed by: INTERNAL MEDICINE

## 2018-03-25 PROCEDURE — 84484 ASSAY OF TROPONIN QUANT: CPT

## 2018-03-25 PROCEDURE — 6360000002 HC RX W HCPCS: Performed by: INTERNAL MEDICINE

## 2018-03-25 PROCEDURE — 6370000000 HC RX 637 (ALT 250 FOR IP): Performed by: ORTHOPAEDIC SURGERY

## 2018-03-25 PROCEDURE — 82962 GLUCOSE BLOOD TEST: CPT

## 2018-03-25 PROCEDURE — 85025 COMPLETE CBC W/AUTO DIFF WBC: CPT

## 2018-03-25 PROCEDURE — 1200000000 HC SEMI PRIVATE

## 2018-03-25 PROCEDURE — 86900 BLOOD TYPING SEROLOGIC ABO: CPT

## 2018-03-25 PROCEDURE — 86901 BLOOD TYPING SEROLOGIC RH(D): CPT

## 2018-03-25 PROCEDURE — 82550 ASSAY OF CK (CPK): CPT

## 2018-03-25 PROCEDURE — 82553 CREATINE MB FRACTION: CPT

## 2018-03-25 PROCEDURE — 36430 TRANSFUSION BLD/BLD COMPNT: CPT

## 2018-03-25 PROCEDURE — 97110 THERAPEUTIC EXERCISES: CPT

## 2018-03-25 PROCEDURE — P9016 RBC LEUKOCYTES REDUCED: HCPCS

## 2018-03-25 RX ORDER — GLIPIZIDE 5 MG/1
5 TABLET, FILM COATED, EXTENDED RELEASE ORAL DAILY
Status: DISCONTINUED | OUTPATIENT
Start: 2018-03-26 | End: 2018-03-27 | Stop reason: HOSPADM

## 2018-03-25 RX ORDER — WARFARIN SODIUM 5 MG/1
5 TABLET ORAL DAILY
Status: DISCONTINUED | OUTPATIENT
Start: 2018-03-25 | End: 2018-03-26

## 2018-03-25 RX ADMIN — DOCUSATE SODIUM 100 MG: 100 CAPSULE, LIQUID FILLED ORAL at 21:12

## 2018-03-25 RX ADMIN — CHLORPROMAZINE HYDROCHLORIDE 25 MG: 25 TABLET, SUGAR COATED ORAL at 10:05

## 2018-03-25 RX ADMIN — CHLORPROMAZINE HYDROCHLORIDE 25 MG: 25 TABLET, SUGAR COATED ORAL at 19:14

## 2018-03-25 RX ADMIN — WARFARIN SODIUM 5 MG: 5 TABLET ORAL at 19:11

## 2018-03-25 RX ADMIN — OMEPRAZOLE 20 MG: 20 CAPSULE, DELAYED RELEASE ORAL at 06:40

## 2018-03-25 RX ADMIN — GLIPIZIDE 2.5 MG: 2.5 TABLET, FILM COATED, EXTENDED RELEASE ORAL at 10:06

## 2018-03-25 RX ADMIN — HYDROCODONE BITARTRATE AND ACETAMINOPHEN 1 TABLET: 5; 325 TABLET ORAL at 11:31

## 2018-03-25 RX ADMIN — NIFEDIPINE 30 MG: 30 TABLET, FILM COATED, EXTENDED RELEASE ORAL at 10:11

## 2018-03-25 RX ADMIN — FUROSEMIDE 40 MG: 40 TABLET ORAL at 10:06

## 2018-03-25 RX ADMIN — INSULIN LISPRO 2 UNITS: 100 INJECTION, SOLUTION INTRAVENOUS; SUBCUTANEOUS at 16:28

## 2018-03-25 RX ADMIN — FINASTERIDE 5 MG: 5 TABLET, FILM COATED ORAL at 10:06

## 2018-03-25 RX ADMIN — INSULIN LISPRO 2 UNITS: 100 INJECTION, SOLUTION INTRAVENOUS; SUBCUTANEOUS at 12:21

## 2018-03-25 RX ADMIN — LEVOTHYROXINE SODIUM 100 MCG: 100 TABLET ORAL at 06:41

## 2018-03-25 RX ADMIN — OMEPRAZOLE 20 MG: 20 CAPSULE, DELAYED RELEASE ORAL at 16:28

## 2018-03-25 RX ADMIN — ENOXAPARIN SODIUM 90 MG: 100 INJECTION SUBCUTANEOUS at 10:05

## 2018-03-25 RX ADMIN — HYDROCODONE BITARTRATE AND ACETAMINOPHEN 1 TABLET: 5; 325 TABLET ORAL at 06:41

## 2018-03-25 RX ADMIN — SIMVASTATIN 40 MG: 40 TABLET, FILM COATED ORAL at 21:12

## 2018-03-25 RX ADMIN — CHLORPROMAZINE HYDROCHLORIDE 25 MG: 25 TABLET, SUGAR COATED ORAL at 15:04

## 2018-03-25 RX ADMIN — LINAGLIPTIN 5 MG: 5 TABLET, FILM COATED ORAL at 21:12

## 2018-03-25 RX ADMIN — DOCUSATE SODIUM 100 MG: 100 CAPSULE, LIQUID FILLED ORAL at 10:07

## 2018-03-25 RX ADMIN — TAMSULOSIN HYDROCHLORIDE 0.4 MG: 0.4 CAPSULE ORAL at 21:12

## 2018-03-25 RX ADMIN — VITAMIN D, TAB 1000IU (100/BT) 1000 UNITS: 25 TAB at 10:06

## 2018-03-25 RX ADMIN — ASPIRIN 325 MG: 325 TABLET, DELAYED RELEASE ORAL at 10:07

## 2018-03-25 ASSESSMENT — PAIN DESCRIPTION - FREQUENCY: FREQUENCY: INTERMITTENT

## 2018-03-25 ASSESSMENT — PAIN DESCRIPTION - PROGRESSION: CLINICAL_PROGRESSION: RAPIDLY WORSENING

## 2018-03-25 ASSESSMENT — PAIN DESCRIPTION - LOCATION
LOCATION: HIP
LOCATION: HIP

## 2018-03-25 ASSESSMENT — PAIN DESCRIPTION - PAIN TYPE: TYPE: ACUTE PAIN

## 2018-03-25 ASSESSMENT — PAIN DESCRIPTION - ONSET: ONSET: ON-GOING

## 2018-03-25 ASSESSMENT — PAIN SCALES - GENERAL
PAINLEVEL_OUTOF10: 0
PAINLEVEL_OUTOF10: 0
PAINLEVEL_OUTOF10: 10
PAINLEVEL_OUTOF10: 0
PAINLEVEL_OUTOF10: 5
PAINLEVEL_OUTOF10: 3

## 2018-03-25 ASSESSMENT — PAIN DESCRIPTION - ORIENTATION
ORIENTATION: LEFT
ORIENTATION: RIGHT

## 2018-03-25 ASSESSMENT — PAIN DESCRIPTION - DESCRIPTORS
DESCRIPTORS: ACHING;DISCOMFORT;DULL
DESCRIPTORS: CONSTANT;DISCOMFORT;SHARP

## 2018-03-25 NOTE — PLAN OF CARE
Problem: Falls - Risk of  Goal: Absence of falls  Outcome: Ongoing      Problem: Risk for Impaired Skin Integrity  Goal: Tissue integrity - skin and mucous membranes  Structural intactness and normal physiological function of skin and  mucous membranes. Outcome: Ongoing  Pt. is refusing assistance with repositioning. Attempted to educate on the importance of repositioning in preventing skin breakdown. Pt shows no signs of learning. Will continue to offer assistance.

## 2018-03-25 NOTE — PROGRESS NOTES
Internal Medicine Progress Note    [facility]. Ale Armijo., & [address] Ale Armijo., F.A.C.O.I. Maria Dolores Zambrano D.O., F.A.C.O.I. Primary Care Physician: Chas Swanson MD   Admitting Physician:  Isidro Crigler, DO  Admission date and time: 3/21/2018  7:25 PM    Room:  [address] [place] [address]  Admitting diagnosis: Hip fracture requiring operative repair, left, closed, initial encounter [facility]) [address] [facility]    Patient Name: Lyly Chavis  MRN: [id number]    Date of Service: 3/25/2018     Subjective: Merilynn Oppenheim is a 80 y. o.  male who was seen and examined today,3/25/2018, at the bedside. Patient seems to be going better today. Heart rhythm appears somewhat improved and family has declined pacemaker at this time. Hopes is to improve his activity and functional capabilities prior to any intervention. He does seem to have poor recall which appeared be a chronic finding but otherwise continue doing relatively well. No family members present during my examination. I reviewed the patient's past medical, surgical history and medication. I reviewed the  course of events since last visit. Review of System:  Constitutional:   Negative for fever and chills. Improved hiccups. HEENT:   Negative for ear pain, sore throat, rhinorrhea and sinus pressure. Negative for eye pain, discharge and redness. Psch:   Denies depression or anxiety. Cardiovascular:   Denies any chest pain, irregular heartbeats, or palpitations. Respiratory:   Denies shortness of breath, coughing, sputum production, hemoptysis, or wheezing. Gastrointestinal:   Denies nausea, vomiting, diarrhea, or constipation. Denies any abdominal pain. Positive for persistent reflux. Extremities:   Denies any lower extremity swelling or edema. Neurology:    Denies any headache or focal neurological deficits. No weakness or paresthesia. Derm:   Denies any rashes, ulcers, or excoriations. Denies bruising.  Surgical incision of a short intramedullary bruno and dynamic nail within the [left hip.] There is anatomical alignment of the left hip.]     1. Intraoperative C-arm fluoroscopy was provided to orthopedic surgery. Physical Exam:  No intake/output data recorded. Intake/Output Summary (Last 24 hours) at 03/25/18 1439  Last data filed at 03/25/18 0654   Gross per 24 hour   Intake              380 ml   Output             1350 ml   Net             -970 ml   I/O last 3 completed shifts: In: 651.3 [P.O.:440; I.V.:211.3]  Out: 2000 [Urine:2000]  Patient Vitals for the past 96 hrs (Last 3 readings):   Weight   03/22/18 0117 190 lb (86.2 kg)   03/21/18 1925 197 lb (89.4 kg)       Vital Signs:   Blood pressure (!) 104/58, pulse 109, temperature 97.9 °F (36.6 °C), temperature source Oral, resp. rate 28, height 6' 1\" (1.854 m), weight 190 lb (86.2 kg), SpO2 95 %. General appearance: Laura Tineo is a 80 y. o.  male who is alert, responsive, oriented to person, place. Psych:   Behavior is normal. Mood appears normal. Speech is not rapid and/or pressured. HEENT:   PERRLA. EOMI. Sclera clear. Buccal mucosa moist. Impaired vision. Upper dentures. Neck:    Supple. Trachea midline. No thyromegaly. No JVD. No bruits. Heart:    Irregular rate and rhythm. Atrial fibrillation with cardiac pauses. No murmurs. Lungs:  Symmetrical. Clear to auscultation bilaterally but diminished. No wheezes. No rhonchi. No rales. Abdomen:   Soft. Non-tender. Non-distended. Bowel sounds positive. No organomegaly or masses. No pain on palpation. Extremities:    Peripheral pulses present. Trace lower leg edema. No ulcers. Dressings intact to the left hip. Sensation is intact and compartments are soft    Neurologic:    Alert. No focal deficit. Cranial nerves grossly intact. Skin:    No rashes or lesions. Surgical changes to the left hip.      Wound Documentation:   Incision 03/22/18 Hip Left (Active)   Wound Assessment LAMONT 3/23/2018

## 2018-03-26 ENCOUNTER — APPOINTMENT (OUTPATIENT)
Dept: CT IMAGING | Age: 83
DRG: 481 | End: 2018-03-26
Payer: MEDICARE

## 2018-03-26 LAB
ANION GAP SERPL CALCULATED.3IONS-SCNC: 11 MMOL/L (ref 7–16)
BASOPHILS ABSOLUTE: 0.02 E9/L (ref 0–0.2)
BASOPHILS RELATIVE PERCENT: 0.3 % (ref 0–2)
BUN BLDV-MCNC: 43 MG/DL (ref 8–23)
CALCIUM SERPL-MCNC: 8.5 MG/DL (ref 8.6–10.2)
CHLORIDE BLD-SCNC: 103 MMOL/L (ref 98–107)
CO2: 26 MMOL/L (ref 22–29)
CREAT SERPL-MCNC: 1.6 MG/DL (ref 0.7–1.2)
EKG ATRIAL RATE: 141 BPM
EKG Q-T INTERVAL: 316 MS
EKG QRS DURATION: 80 MS
EKG QTC CALCULATION (BAZETT): 425 MS
EKG R AXIS: 14 DEGREES
EKG T AXIS: 0 DEGREES
EKG VENTRICULAR RATE: 109 BPM
EOSINOPHILS ABSOLUTE: 0.06 E9/L (ref 0.05–0.5)
EOSINOPHILS RELATIVE PERCENT: 0.9 % (ref 0–6)
GFR AFRICAN AMERICAN: 49
GFR NON-AFRICAN AMERICAN: 41 ML/MIN/1.73
GLUCOSE BLD-MCNC: 121 MG/DL (ref 74–109)
HCT VFR BLD CALC: 24.9 % (ref 37–54)
HEMOGLOBIN: 8.3 G/DL (ref 12.5–16.5)
IMMATURE GRANULOCYTES #: 0.31 E9/L
IMMATURE GRANULOCYTES %: 4.7 % (ref 0–5)
INR BLD: 1.1
LYMPHOCYTES ABSOLUTE: 1.18 E9/L (ref 1.5–4)
LYMPHOCYTES RELATIVE PERCENT: 17.9 % (ref 20–42)
MCH RBC QN AUTO: 28.8 PG (ref 26–35)
MCHC RBC AUTO-ENTMCNC: 33.3 % (ref 32–34.5)
MCV RBC AUTO: 86.5 FL (ref 80–99.9)
METER GLUCOSE: 119 MG/DL (ref 70–110)
METER GLUCOSE: 203 MG/DL (ref 70–110)
METER GLUCOSE: 218 MG/DL (ref 70–110)
METER GLUCOSE: 90 MG/DL (ref 70–110)
MONOCYTES ABSOLUTE: 1.08 E9/L (ref 0.1–0.95)
MONOCYTES RELATIVE PERCENT: 16.4 % (ref 2–12)
NEUTROPHILS ABSOLUTE: 3.94 E9/L (ref 1.8–7.3)
NEUTROPHILS RELATIVE PERCENT: 59.8 % (ref 43–80)
PDW BLD-RTO: 14.4 FL (ref 11.5–15)
PLATELET # BLD: 129 E9/L (ref 130–450)
PMV BLD AUTO: 11.9 FL (ref 7–12)
POTASSIUM SERPL-SCNC: 3.8 MMOL/L (ref 3.5–5)
PROTHROMBIN TIME: 12.6 SEC (ref 9.3–12.4)
RBC # BLD: 2.88 E12/L (ref 3.8–5.8)
SODIUM BLD-SCNC: 140 MMOL/L (ref 132–146)
WBC # BLD: 6.6 E9/L (ref 4.5–11.5)

## 2018-03-26 PROCEDURE — 74150 CT ABDOMEN W/O CONTRAST: CPT

## 2018-03-26 PROCEDURE — 51798 US URINE CAPACITY MEASURE: CPT

## 2018-03-26 PROCEDURE — 80048 BASIC METABOLIC PNL TOTAL CA: CPT

## 2018-03-26 PROCEDURE — 6370000000 HC RX 637 (ALT 250 FOR IP): Performed by: ORTHOPAEDIC SURGERY

## 2018-03-26 PROCEDURE — 97116 GAIT TRAINING THERAPY: CPT

## 2018-03-26 PROCEDURE — 85025 COMPLETE CBC W/AUTO DIFF WBC: CPT

## 2018-03-26 PROCEDURE — 6370000000 HC RX 637 (ALT 250 FOR IP): Performed by: INTERNAL MEDICINE

## 2018-03-26 PROCEDURE — 6360000004 HC RX CONTRAST MEDICATION: Performed by: RADIOLOGY

## 2018-03-26 PROCEDURE — 97530 THERAPEUTIC ACTIVITIES: CPT

## 2018-03-26 PROCEDURE — 51702 INSERT TEMP BLADDER CATH: CPT

## 2018-03-26 PROCEDURE — 97110 THERAPEUTIC EXERCISES: CPT

## 2018-03-26 PROCEDURE — 36415 COLL VENOUS BLD VENIPUNCTURE: CPT

## 2018-03-26 PROCEDURE — 6360000002 HC RX W HCPCS: Performed by: INTERNAL MEDICINE

## 2018-03-26 PROCEDURE — 82962 GLUCOSE BLOOD TEST: CPT

## 2018-03-26 PROCEDURE — 85610 PROTHROMBIN TIME: CPT

## 2018-03-26 PROCEDURE — 1200000000 HC SEMI PRIVATE

## 2018-03-26 RX ORDER — DEXTROSE MONOHYDRATE 50 MG/ML
100 INJECTION, SOLUTION INTRAVENOUS PRN
Status: DISCONTINUED | OUTPATIENT
Start: 2018-03-26 | End: 2018-03-27 | Stop reason: HOSPADM

## 2018-03-26 RX ORDER — NICOTINE POLACRILEX 4 MG
15 LOZENGE BUCCAL PRN
Status: DISCONTINUED | OUTPATIENT
Start: 2018-03-26 | End: 2018-03-27 | Stop reason: HOSPADM

## 2018-03-26 RX ORDER — DEXTROSE MONOHYDRATE 25 G/50ML
12.5 INJECTION, SOLUTION INTRAVENOUS PRN
Status: DISCONTINUED | OUTPATIENT
Start: 2018-03-26 | End: 2018-03-27 | Stop reason: HOSPADM

## 2018-03-26 RX ADMIN — ASPIRIN 325 MG: 325 TABLET, DELAYED RELEASE ORAL at 09:22

## 2018-03-26 RX ADMIN — LINAGLIPTIN 5 MG: 5 TABLET, FILM COATED ORAL at 20:58

## 2018-03-26 RX ADMIN — TAMSULOSIN HYDROCHLORIDE 0.4 MG: 0.4 CAPSULE ORAL at 20:58

## 2018-03-26 RX ADMIN — INSULIN LISPRO 2 UNITS: 100 INJECTION, SOLUTION INTRAVENOUS; SUBCUTANEOUS at 12:28

## 2018-03-26 RX ADMIN — OMEPRAZOLE 20 MG: 20 CAPSULE, DELAYED RELEASE ORAL at 06:50

## 2018-03-26 RX ADMIN — NIFEDIPINE 30 MG: 30 TABLET, FILM COATED, EXTENDED RELEASE ORAL at 09:22

## 2018-03-26 RX ADMIN — FINASTERIDE 5 MG: 5 TABLET, FILM COATED ORAL at 09:22

## 2018-03-26 RX ADMIN — SIMVASTATIN 40 MG: 40 TABLET, FILM COATED ORAL at 20:58

## 2018-03-26 RX ADMIN — IOHEXOL 50 ML: 240 INJECTION, SOLUTION INTRATHECAL; INTRAVASCULAR; INTRAVENOUS; ORAL at 19:53

## 2018-03-26 RX ADMIN — GLIPIZIDE 5 MG: 2.5 TABLET, FILM COATED, EXTENDED RELEASE ORAL at 09:22

## 2018-03-26 RX ADMIN — VITAMIN D, TAB 1000IU (100/BT) 1000 UNITS: 25 TAB at 09:22

## 2018-03-26 RX ADMIN — FUROSEMIDE 40 MG: 40 TABLET ORAL at 09:22

## 2018-03-26 RX ADMIN — DOCUSATE SODIUM 100 MG: 100 CAPSULE, LIQUID FILLED ORAL at 20:58

## 2018-03-26 RX ADMIN — CHLORPROMAZINE HYDROCHLORIDE 25 MG: 25 TABLET, SUGAR COATED ORAL at 09:22

## 2018-03-26 RX ADMIN — DOCUSATE SODIUM 100 MG: 100 CAPSULE, LIQUID FILLED ORAL at 09:22

## 2018-03-26 RX ADMIN — LEVOTHYROXINE SODIUM 100 MCG: 100 TABLET ORAL at 06:50

## 2018-03-26 RX ADMIN — ATENOLOL 50 MG: 50 TABLET ORAL at 20:59

## 2018-03-26 ASSESSMENT — PAIN SCALES - GENERAL
PAINLEVEL_OUTOF10: 0
PAINLEVEL_OUTOF10: 0

## 2018-03-26 NOTE — PROGRESS NOTES
nail fixation performed on 3/22/2018 by Dr. Regina Kilpatrick. 2. Intermittent periods of atrial fibrillation with rapid ventricular response with intermixed cardiac pauses with concern for sick sinus syndrome  3. Acute blood loss anemia. 4. Essential hypertension  5. Hyperlipidemia  6. Hypothyroidism  7. Hyperlipidemia  8. Gastroesophageal reflux disease  9. Gout    Plan:   From a hip fracture standpoint, the patient continues to work with the rehabilitation team. His pain is well-controlled. The patient continues to have intermittent periods of atrial fibrillation with rapid ventricular response and occasional cardiac pauses. The cardiovascular team has discussed pacemaker placement in the past but the family was originally reluctant. They would be agreeable to pacemaker placement if this could be performed at [facility] and I informed them that this could be arranged. I will discuss this with the cardiac team and pacemaker placement will be placed at their discretion if necessary. Otherwise, the family is very concerned about the patient's hiccups. I suspect he has an underlying hiatal hernia and we will assess a CT scan of the abdomen. Continue current therapy. See orders for further plan of care.     Marissa Ramon D.O.  3:20 PM  3/26/2018

## 2018-03-26 NOTE — PROGRESS NOTES
training in energy conservation principles, and   patient and family education.   - Patient and/or family understands diagnosis, prognosis and plan of care: yes     Treatment minutes: [address] [facility], [address] [facility]

## 2018-03-27 ENCOUNTER — HOSPITAL ENCOUNTER (OUTPATIENT)
Dept: CARDIAC CATH/INVASIVE PROCEDURES | Age: 83
Discharge: OTHER INSTITUTION | End: 2018-03-28
Attending: INTERNAL MEDICINE | Admitting: INTERNAL MEDICINE
Payer: MEDICARE

## 2018-03-27 ENCOUNTER — HOSPITAL ENCOUNTER (OUTPATIENT)
Age: 83
Discharge: HOME | End: 2018-03-27
Payer: MEDICARE

## 2018-03-27 VITALS
SYSTOLIC BLOOD PRESSURE: 108 MMHG | HEART RATE: 74 BPM | HEIGHT: 73 IN | TEMPERATURE: 97.5 F | BODY MASS INDEX: 25.18 KG/M2 | OXYGEN SATURATION: 95 % | RESPIRATION RATE: 18 BRPM | DIASTOLIC BLOOD PRESSURE: 66 MMHG | WEIGHT: 190 LBS

## 2018-03-27 DIAGNOSIS — I10 ESSENTIAL HYPERTENSION: ICD-10-CM

## 2018-03-27 DIAGNOSIS — I48.91 ATRIAL FIBRILLATION WITH RVR (HCC): Primary | ICD-10-CM

## 2018-03-27 DIAGNOSIS — Z95.0 PACEMAKER: ICD-10-CM

## 2018-03-27 LAB
ANION GAP SERPL CALCULATED.3IONS-SCNC: 11 MMOL/L (ref 7–16)
BASOPHILS ABSOLUTE: 0.01 E9/L (ref 0–0.2)
BASOPHILS RELATIVE PERCENT: 0.1 % (ref 0–2)
BUN BLDV-MCNC: 44 MG/DL (ref 8–23)
CALCIUM SERPL-MCNC: 8.3 MG/DL (ref 8.6–10.2)
CHLORIDE BLD-SCNC: 102 MMOL/L (ref 98–107)
CO2: 27 MMOL/L (ref 22–29)
CREAT SERPL-MCNC: 1.5 MG/DL (ref 0.7–1.2)
EOSINOPHILS ABSOLUTE: 0.05 E9/L (ref 0.05–0.5)
EOSINOPHILS RELATIVE PERCENT: 0.7 % (ref 0–6)
GFR AFRICAN AMERICAN: 53
GFR NON-AFRICAN AMERICAN: 44 ML/MIN/1.73
GLUCOSE BLD-MCNC: 179 MG/DL (ref 74–109)
HCT VFR BLD CALC: 25 % (ref 37–54)
HEMOGLOBIN: 8.3 G/DL (ref 12.5–16.5)
IMMATURE GRANULOCYTES #: 0.33 E9/L
IMMATURE GRANULOCYTES %: 4.4 % (ref 0–5)
INR BLD: 1.2
LYMPHOCYTES ABSOLUTE: 1.13 E9/L (ref 1.5–4)
LYMPHOCYTES RELATIVE PERCENT: 15.2 % (ref 20–42)
MCH RBC QN AUTO: 29.4 PG (ref 26–35)
MCHC RBC AUTO-ENTMCNC: 33.2 % (ref 32–34.5)
MCV RBC AUTO: 88.7 FL (ref 80–99.9)
METER GLUCOSE: 181 MG/DL (ref 70–110)
MONOCYTES ABSOLUTE: 1.28 E9/L (ref 0.1–0.95)
MONOCYTES RELATIVE PERCENT: 17.2 % (ref 2–12)
NEUTROPHILS ABSOLUTE: 4.63 E9/L (ref 1.8–7.3)
NEUTROPHILS RELATIVE PERCENT: 62.4 % (ref 43–80)
PDW BLD-RTO: 14.5 FL (ref 11.5–15)
PLATELET # BLD: 160 E9/L (ref 130–450)
PMV BLD AUTO: 11.7 FL (ref 7–12)
POTASSIUM SERPL-SCNC: 3.9 MMOL/L (ref 3.5–5)
PROTHROMBIN TIME: 13.5 SEC (ref 9.3–12.4)
RBC # BLD: 2.82 E12/L (ref 3.8–5.8)
SODIUM BLD-SCNC: 140 MMOL/L (ref 132–146)
WBC # BLD: 7.4 E9/L (ref 4.5–11.5)

## 2018-03-27 PROCEDURE — 33207 INSERT HEART PM VENTRICULAR: CPT | Performed by: INTERNAL MEDICINE

## 2018-03-27 PROCEDURE — 2500000003 HC RX 250 WO HCPCS

## 2018-03-27 PROCEDURE — 2580000003 HC RX 258: Performed by: INTERNAL MEDICINE

## 2018-03-27 PROCEDURE — C1786 PMKR, SINGLE, RATE-RESP: HCPCS

## 2018-03-27 PROCEDURE — A0426 ALS 1: HCPCS

## 2018-03-27 PROCEDURE — A0425 GROUND MILEAGE: HCPCS

## 2018-03-27 PROCEDURE — 6360000002 HC RX W HCPCS

## 2018-03-27 PROCEDURE — 80048 BASIC METABOLIC PNL TOTAL CA: CPT

## 2018-03-27 PROCEDURE — 36415 COLL VENOUS BLD VENIPUNCTURE: CPT

## 2018-03-27 PROCEDURE — 6360000002 HC RX W HCPCS: Performed by: INTERNAL MEDICINE

## 2018-03-27 PROCEDURE — 85025 COMPLETE CBC W/AUTO DIFF WBC: CPT

## 2018-03-27 PROCEDURE — 6370000000 HC RX 637 (ALT 250 FOR IP): Performed by: ORTHOPAEDIC SURGERY

## 2018-03-27 PROCEDURE — 6370000000 HC RX 637 (ALT 250 FOR IP): Performed by: INTERNAL MEDICINE

## 2018-03-27 PROCEDURE — 82962 GLUCOSE BLOOD TEST: CPT

## 2018-03-27 PROCEDURE — C1898 LEAD, PMKR, OTHER THAN TRANS: HCPCS

## 2018-03-27 PROCEDURE — 2709999900 HC NON-CHARGEABLE SUPPLY

## 2018-03-27 PROCEDURE — 97110 THERAPEUTIC EXERCISES: CPT

## 2018-03-27 PROCEDURE — 85610 PROTHROMBIN TIME: CPT

## 2018-03-27 PROCEDURE — 2580000003 HC RX 258

## 2018-03-27 RX ORDER — SODIUM CHLORIDE 9 MG/ML
INJECTION, SOLUTION INTRAVENOUS ONCE
Status: COMPLETED | OUTPATIENT
Start: 2018-03-27 | End: 2018-03-27

## 2018-03-27 RX ORDER — SODIUM CHLORIDE 0.9 % (FLUSH) 0.9 %
10 SYRINGE (ML) INJECTION PRN
Status: CANCELLED | OUTPATIENT
Start: 2018-03-27

## 2018-03-27 RX ORDER — SODIUM CHLORIDE 9 MG/ML
INJECTION, SOLUTION INTRAVENOUS CONTINUOUS
Status: CANCELLED | OUTPATIENT
Start: 2018-03-27

## 2018-03-27 RX ORDER — SODIUM CHLORIDE 0.9 % (FLUSH) 0.9 %
10 SYRINGE (ML) INJECTION EVERY 12 HOURS SCHEDULED
Status: DISCONTINUED | OUTPATIENT
Start: 2018-03-27 | End: 2018-03-28 | Stop reason: HOSPADM

## 2018-03-27 RX ORDER — ACETAMINOPHEN 325 MG/1
650 TABLET ORAL EVERY 4 HOURS PRN
Status: DISCONTINUED | OUTPATIENT
Start: 2018-03-27 | End: 2018-03-28 | Stop reason: HOSPADM

## 2018-03-27 RX ORDER — ONDANSETRON 2 MG/ML
4 INJECTION INTRAMUSCULAR; INTRAVENOUS EVERY 6 HOURS PRN
Status: DISCONTINUED | OUTPATIENT
Start: 2018-03-27 | End: 2018-03-28 | Stop reason: HOSPADM

## 2018-03-27 RX ORDER — SODIUM CHLORIDE 9 MG/ML
INJECTION, SOLUTION INTRAVENOUS CONTINUOUS
Status: DISCONTINUED | OUTPATIENT
Start: 2018-03-27 | End: 2018-03-28

## 2018-03-27 RX ORDER — SODIUM CHLORIDE 0.9 % (FLUSH) 0.9 %
10 SYRINGE (ML) INJECTION PRN
Status: DISCONTINUED | OUTPATIENT
Start: 2018-03-27 | End: 2018-03-28 | Stop reason: HOSPADM

## 2018-03-27 RX ORDER — CHLORPROMAZINE HYDROCHLORIDE 10 MG/1
10 TABLET, FILM COATED ORAL 3 TIMES DAILY PRN
Status: DISCONTINUED | OUTPATIENT
Start: 2018-03-27 | End: 2018-03-28 | Stop reason: HOSPADM

## 2018-03-27 RX ORDER — CEPHALEXIN 250 MG/1
250 CAPSULE ORAL EVERY 6 HOURS SCHEDULED
Status: DISCONTINUED | OUTPATIENT
Start: 2018-03-28 | End: 2018-03-28 | Stop reason: HOSPADM

## 2018-03-27 RX ORDER — SODIUM CHLORIDE 0.9 % (FLUSH) 0.9 %
10 SYRINGE (ML) INJECTION EVERY 12 HOURS SCHEDULED
Status: CANCELLED | OUTPATIENT
Start: 2018-03-27

## 2018-03-27 RX ADMIN — CHLORPROMAZINE HYDROCHLORIDE 10 MG: 10 TABLET, SUGAR COATED ORAL at 19:41

## 2018-03-27 RX ADMIN — CEFAZOLIN SODIUM 2 G: 2 SOLUTION INTRAVENOUS at 19:07

## 2018-03-27 RX ADMIN — Medication 10 ML: at 19:41

## 2018-03-27 RX ADMIN — OMEPRAZOLE 20 MG: 20 CAPSULE, DELAYED RELEASE ORAL at 06:33

## 2018-03-27 RX ADMIN — HYDROCODONE BITARTRATE AND ACETAMINOPHEN 1 TABLET: 5; 325 TABLET ORAL at 01:49

## 2018-03-27 RX ADMIN — LEVOTHYROXINE SODIUM 100 MCG: 100 TABLET ORAL at 06:33

## 2018-03-27 RX ADMIN — CHLORPROMAZINE HYDROCHLORIDE 25 MG: 25 TABLET, SUGAR COATED ORAL at 01:51

## 2018-03-27 RX ADMIN — SODIUM CHLORIDE: 9 INJECTION, SOLUTION INTRAVENOUS at 12:07

## 2018-03-27 ASSESSMENT — PAIN SCALES - GENERAL
PAINLEVEL_OUTOF10: 0
PAINLEVEL_OUTOF10: 0
PAINLEVEL_OUTOF10: 3

## 2018-03-27 ASSESSMENT — PAIN DESCRIPTION - PAIN TYPE: TYPE: ACUTE PAIN

## 2018-03-27 ASSESSMENT — PAIN DESCRIPTION - DESCRIPTORS: DESCRIPTORS: CONSTANT;DISCOMFORT;SHARP

## 2018-03-27 ASSESSMENT — PAIN DESCRIPTION - LOCATION: LOCATION: HIP

## 2018-03-27 ASSESSMENT — PAIN DESCRIPTION - ORIENTATION: ORIENTATION: LEFT

## 2018-03-27 NOTE — PROGRESS NOTES
Department of Orthopedic Surgery  Resident Progress Note    Patient seen and examined. Pain controlled. No new complaints. Family is bedside. Denies chest pain, shortness of breath, calf pain, dizziness/lightheadedness. He walked 3 ft yesterday with PT yesterday bedside. VITALS:  /66   Pulse 74   Temp 97.5 °F (36.4 °C) (Oral)   Resp 18   Ht 6' 1\" (1.854 m)   Wt 190 lb (86.2 kg)   SpO2 95%   BMI 25.07 kg/m²     GENERAL: awake, alert  MUSCULOSKELETAL:   left lower extremity:  · Aquacels intact, minor drainage within aqaucel  · Compartments soft and compressible, calf non-tender  · Palpable dorsalis pedis and posterior tibialis pulse, brisk cap refill to toes, foot warm and perfused  · Sensation intact to light touch in sural/deep peroneal/superficial peroneal/saphenous/posterior tibial nerve distributions to foot/ankle.   · Demonstrates active ankle plantar/dorsiflexion/great toe extension    CBC:   Lab Results   Component Value Date    WBC 7.4 03/27/2018    HGB 8.3 03/27/2018    HCT 25.0 03/27/2018     03/27/2018       ASSESSMENT  · s/p L CMN 3/22  · Acute blood loss anemia sp surgery stable    PLAN    WBAT LLE  Deep venous thrombosis prophylaxis - Coumadin  PT/OT  Pain Control:  PO  Monitor H&H- vitals stable  · Discuss with Dr. Marlyn Marx

## 2018-03-27 NOTE — PROGRESS NOTES
movement. Comment: Call light left by patient. Pt returned to bed at end of treatment, alarm activated. A: Pt with decreased functional activity this session due to scheduled pacemaker today . Decreased strength and endurance limiting function, at risk of falls. P: Continue with physical therapy    Melanie Danielle PTA      Goals to be met in 3 days. Bed mobility-Moderate assistance    Transfers-Moderate assistance    Ambulation-Moderate assistance   for 10 feet using walker      Increase rom in affected joints by 10%  Increase strength in affected muscle groups by 1/3 grade  Increase balance to allow for improvement towards functional goals. Increase endurance to allow for improvement towards functional goals.

## 2018-03-27 NOTE — PROGRESS NOTES
Patient's family was under the impression that patient would be transferred back to [address] [place] [address] after his pacer was inserted and they are requesting for that to be done, page out for Dr. Prasad Del Cid to update. Spoke with Dr. Alea Dupree r/g above family now ok with patient staying here. Dr. Alea Dupree also notified that patient's home meds were not ordered.

## 2018-03-27 NOTE — PROGRESS NOTES
Results   Component Value Date     07/02/2018    K 4.5 07/02/2018     07/02/2018    CO2 24 07/02/2018    BUN 19 07/02/2018    LABALBU 3.4 07/02/2018    CREATININE 1.3 07/02/2018    CALCIUM 9.2 07/02/2018    GFRAA >60 07/02/2018    LABGLOM 52 07/02/2018    GLUCOSE 117 07/02/2018       Other Data:    No intake or output data in the 24 hours ending 07/02/18 1838      Scheduled Medications:      Assessment:   1. Closed L intertrochanteric fracture s/p closed reduction fluoroscopic guidance with short lock cephalo-medullary nail fixation performed on 3/22/2018 by Dr. Briones. 2. Intermittent periods of atrial fibrillation with rapid ventricular response with intermixed cardiac pauses with concern for sick sinus syndrome  3. Acute blood loss anemia. 4. Essential hypertension  5. Hyperlipidemia  6. Hypothyroidism    Plan:    For pacemaker  Yakelin Chi D.O.  6:38 PM  7/2/2018  Late entry note

## 2018-03-28 VITALS
DIASTOLIC BLOOD PRESSURE: 86 MMHG | HEART RATE: 89 BPM | TEMPERATURE: 98.6 F | SYSTOLIC BLOOD PRESSURE: 124 MMHG | OXYGEN SATURATION: 96 % | WEIGHT: 178 LBS | RESPIRATION RATE: 18 BRPM | HEIGHT: 73 IN | BODY MASS INDEX: 23.59 KG/M2

## 2018-03-28 PROCEDURE — 6360000002 HC RX W HCPCS: Performed by: INTERNAL MEDICINE

## 2018-03-28 PROCEDURE — 6370000000 HC RX 637 (ALT 250 FOR IP): Performed by: INTERNAL MEDICINE

## 2018-03-28 PROCEDURE — 2580000003 HC RX 258: Performed by: INTERNAL MEDICINE

## 2018-03-28 RX ORDER — ATENOLOL 50 MG/1
25 TABLET ORAL NIGHTLY
Qty: 30 TABLET | Refills: 3 | Status: ON HOLD | OUTPATIENT
Start: 2018-03-28 | End: 2018-08-09

## 2018-03-28 RX ORDER — ATENOLOL 25 MG/1
25 TABLET ORAL DAILY
Status: DISCONTINUED | OUTPATIENT
Start: 2018-03-28 | End: 2018-03-28 | Stop reason: HOSPADM

## 2018-03-28 RX ORDER — CEPHALEXIN 250 MG/1
250 CAPSULE ORAL 3 TIMES DAILY
Qty: 21 CAPSULE | Refills: 0 | Status: SHIPPED | OUTPATIENT
Start: 2018-03-28 | End: 2018-04-04

## 2018-03-28 RX ORDER — WARFARIN SODIUM 5 MG/1
5 TABLET ORAL DAILY
Status: DISCONTINUED | OUTPATIENT
Start: 2018-03-28 | End: 2018-03-28 | Stop reason: HOSPADM

## 2018-03-28 RX ADMIN — ATENOLOL 25 MG: 25 TABLET ORAL at 17:15

## 2018-03-28 RX ADMIN — CHLORPROMAZINE HYDROCHLORIDE 10 MG: 10 TABLET, SUGAR COATED ORAL at 17:00

## 2018-03-28 RX ADMIN — CEPHALEXIN 250 MG: 250 CAPSULE ORAL at 08:32

## 2018-03-28 RX ADMIN — CEFAZOLIN SODIUM 2 G: 2 SOLUTION INTRAVENOUS at 03:12

## 2018-03-28 RX ADMIN — WARFARIN SODIUM 5 MG: 5 TABLET ORAL at 17:15

## 2018-03-28 RX ADMIN — CEPHALEXIN 250 MG: 250 CAPSULE ORAL at 17:15

## 2018-03-28 RX ADMIN — CHLORPROMAZINE HYDROCHLORIDE 10 MG: 10 TABLET, SUGAR COATED ORAL at 07:08

## 2018-03-28 RX ADMIN — Medication 10 ML: at 08:33

## 2018-03-28 ASSESSMENT — PAIN SCALES - GENERAL
PAINLEVEL_OUTOF10: 0

## 2018-03-28 NOTE — DISCHARGE SUMMARY
surgery. 3 images were obtained. A total of 25.82 mGy was recorded. Note is made of a short intramedullary bruno and dynamic nail within the [left hip.] There is anatomical alignment of the left hip.]     1. Intraoperative C-arm fluoroscopy was provided to orthopedic surgery. HOSPITAL COURSE:   Varsha Díaz presented to [facility] emergency department after suffering a fall at home. He was found to have a hip fracture that required orthopedic intervention throughout the hospital stay. Postoperatively, he developed intermittent periods of atrial fibrillation with rapid ventricular response interposed with bradycardia. This appeared to be compatible with sick sinus syndrome and the patient was ultimately transferred to [facility] for pacemaker placement. His family was updated throughout the hospital stay. He was arranged for outpatient rehabilitation prior to being transferred to [place] [address] ON DAY OF DISCHARGE:  VITALS:  /66   Pulse 74   Temp 97.5 °F (36.4 °C) (Oral)   Resp 18   Ht 6' 1\" (1.854 m)   Wt 190 lb (86.2 kg)   SpO2 95%   BMI 25.07 kg/m²     HEENT:  PERRLA. EOMI. Sclera clear. Buccal mucosa moist.    Neck:  Supple. Trachea midline. No thyromegaly. No JVD. No bruits. Heart:  Rhythm regular, rate controlled. No murmurs. Lungs:  Symmetrical. Clear to auscultation bilaterally. No wheezes. No rhonchi. No rales. Abdomen: Soft. Non-tender. Non-distended. Bowel sounds positive. No organomegaly or masses. No pain on palpation    Extremities:  Peripheral pulses present. Postoperative incision site was healing well. Neurologic:  Alert x 3. No focal deficit. Cranial nerves grossly intact. Skin:  No petechia. No hemorrhage. No wounds. DISPOSITION:  The patient's condition is good.   At this time the patient is without objective evidence of an acute process requiring continuing hospitalization or inpatient

## 2018-03-28 NOTE — DISCHARGE SUMMARY
Physician Discharge Summary     Patient ID:  Asha Lei  [id number]  64 y.o.  6/3/1926    Admit date: 3/27/2018    Discharge date and time: 3/28/2018  6:51 PM     Admitting Physician: David Cevallos MD     Discharge Ledy Zurita M.D. Admission Diagnoses: Pacemaker [Z95.0]    Discharge Diagnoses: Sinus pauses status post pacemaker    Admission Condition: fair    Discharged Condition: good    Indication for Admission: Status post pacemaker implant    Hospital Course: Did well postprocedure      Outstanding Order Results     No orders found from 2/26/2018 to 3/28/2018. Discharge Exam:  /86   Pulse 89   Temp 98.6 °F (37 °C) (Oral)   Resp 18   Ht 6' 1\" (1.854 m)   Wt 178 lb (80.7 kg)   SpO2 96%   BMI 23.48 kg/m²   CONSTITUTIONAL:  awake, alert, cooperative, no apparent distress, and appears stated age  HEAD:  normocepalic, without obvious abnormality, atraumatic  NECK:  Supple, symmetrical, trachea midline, no adenopathy, thyroid symmetric, not enlarged and no tenderness, skin normal  LUNGS:  No increased work of breathing, good air exchange, clear to auscultation bilaterally, no crackles or wheezing  CARDIOVASCULAR:  Normal apical impulse, regular rate and rhythm, systolic murmur  ABDOMEN:  Soft, nontender, no masses, no hepatomegaly, no splenomegaly, BS+  MUSCULOSKELETAL:  No clubbing no cyanosis. there is no redness, warmth, or swelling of the joints  NEUROLOGIC:  Alert, awake,oriented x3  SKIN:  no bruising or bleeding, normal skin color, texture, turgor and no redness, warmth, or swelling  Disposition: SNF    Patient Instructions:    Elisabet Hobson   Home Medication Instructions EMB:[id number]    Printed on:07/02/18 7672   Medication Information                      atenolol (TENORMIN) 50 MG tablet  Take 0.5 tablets by mouth nightly             Cholecalciferol (VITAMIN D3) 1000 units CAPS  Take 1 capsule by mouth daily             finasteride (PROSCAR) 5 MG tablet  Take 5 mg by

## 2018-03-28 NOTE — CARE COORDINATION
SOCIAL WORK AND DISCHARGE PLANNING: [facility] at 6 p.m. I called daughter, Juan Francisco Soto, and she is in agreement. To [facility] of the [facility]. snf/loc.  Dot Funez  3/28/2018

## 2018-03-28 NOTE — PROGRESS NOTES
PROGRESS NOTE       PATIENT PROBLEM LIST:  Active Problems:    Pacemaker  Resolved Problems:    * No resolved hospital problems. *      SUBJECTIVE:  Frances Lashonda states ***    REVIEW OF SYSTEMS:  General ROS: positive for fatigue, malaise, night sweats, weight gain or weight loss  Psychological ROS: negative for - anxiety or depression  Ophthalmic ROS: negative for - decreased vision or visual distortion. ENT ROS: negative  Allergy and Immunology ROS: negative  Hematological and Lymphatic ROS: negative  Endocrine ROS: negative  Respiratory ROS: positive for - {rosresp symptoms:331817}  Cardiovascular ROS: positive for - {roscv symptoms:953819}, no claudication. Gastrointestinal ROS: no abdominal pain, change in bowel habits, or black or bloody stools  Genito-Urinary ROS: no dysuria, trouble voiding, or hematuria  Musculoskeletal ROS: negative  Neurological ROS: no TIA or stroke symptoms otherwise no significant change in symptoms or problems since yesterday as documented in previous progress notes. SCHEDULED MEDICATIONS:   sodium chloride flush  10 mL Intravenous 2 times per day    ceFAZolin (ANCEF) IVPB  2 g Intravenous Q8H    [START ON 3/28/2018] cephALEXin  250 mg Oral 4 times per day    sodium chloride flush  10 mL Intravenous 2 times per day       VITAL SIGNS:                                                                                                                          /82   Pulse 82   Temp 99.1 °F (37.3 °C) (Temporal)   Resp 20   Ht 6' 1\" (1.854 m)   Wt 190 lb (86.2 kg)   SpO2 96%   BMI 25.07 kg/m²   Patient Vitals for the past 96 hrs (Last 3 readings):   Weight   03/27/18 1208 190 lb (86.2 kg)     OBJECTIVE:    HEENT: PERRL, EOM  Intact; sclera non-icteric, conjunctiva pink. Carotids are brisk in upstroke with normal contour. No carotid bruits. Normal jugular venous pulsation at 45°. No palpable cervical nor supraclavicular nodes. Thyroid not palpable. Trachea midline.   Chest: Serial axial images through the abdomen were obtained following the uneventful administration of 50 cc of oral contrast. IV contrast was not administered. One or more of the following dose reduction techniques were used: Automated exposure control. Adjustment of the mA and/or kV according to patient size. Use of iterative reconstruction technique. FINDINGS: Images through the lung bases demonstrate emphysematous changes and minimal honeycombing in the lung bases. There is a very small hiatal hernia. The esophageal wall is not thickened. The liver, spleen and adrenal glands are unremarkable. There is fatty replacement of the pancreas. Bilateral multiple simple renal cysts are noted most consistent with polycystic renal disease. No solid mass or obstructive uropathy is noted. There is no large or small bowel obstruction. The gallbladder is normally distended. There are no right lower quadrant inflammatory changes. Atherosclerotic plaque is seen throughout the course of the abdominal aorta. There is no aneurysm. Bone windows of the lumbar spine demonstrate findings of multilevel degenerative disc disease. 1. Small hiatal hernia. 2. There is no acute intra-abdominal or intrapelvic pathology. 3. Multiple bilateral simple renal cysts consistent with polycystic renal disease. Xr Hip Left (1 View)    Result Date: 3/21/2018  Reading location:  200 CLINICAL STATEMENT: Fractured left hip A portable AP view of the hip with traction reveals again the comminuted intertrochanteric fracture of the left hip. The varus deformity has significantly improved. The fracture is slightly impacted. 1. Improvement in the varus deformity but slight impaction persists. Xr Hip Left (2-3 Views)    Addendum Date: 3/21/2018    Reading location:  200 CLINICAL STATEMENT addendum: The IMPRESSION should read comminuted intertrochanteric fracture of the left hip with varus deformity.     Result Date: 3/21/2018  Reading location:  100

## 2018-04-06 LAB
EKG ATRIAL RATE: 98 BPM
EKG Q-T INTERVAL: 378 MS
EKG QRS DURATION: 86 MS
EKG QTC CALCULATION (BAZETT): 467 MS
EKG R AXIS: 13 DEGREES
EKG T AXIS: -4 DEGREES
EKG VENTRICULAR RATE: 92 BPM

## 2018-06-12 ENCOUNTER — HOSPITAL ENCOUNTER (OUTPATIENT)
Age: 83
Discharge: HOME | End: 2018-06-14
Payer: MEDICARE

## 2018-06-12 LAB
ALBUMIN SERPL-MCNC: 3.7 G/DL (ref 3.5–5.2)
ALP BLD-CCNC: 123 U/L (ref 40–129)
ALT SERPL-CCNC: 15 U/L (ref 0–40)
ANION GAP SERPL CALCULATED.3IONS-SCNC: 16 MMOL/L (ref 7–16)
AST SERPL-CCNC: 19 U/L (ref 0–39)
BILIRUB SERPL-MCNC: 0.4 MG/DL (ref 0–1.2)
BUN BLDV-MCNC: 28 MG/DL (ref 8–23)
CALCIUM SERPL-MCNC: 9.3 MG/DL (ref 8.6–10.2)
CHLORIDE BLD-SCNC: 103 MMOL/L (ref 98–107)
CO2: 20 MMOL/L (ref 22–29)
CREAT SERPL-MCNC: 1.3 MG/DL (ref 0.7–1.2)
GFR AFRICAN AMERICAN: >60
GFR NON-AFRICAN AMERICAN: 52 ML/MIN/1.73
GLUCOSE BLD-MCNC: 240 MG/DL (ref 74–109)
HBA1C MFR BLD: 6.7 % (ref 4.8–5.9)
POTASSIUM SERPL-SCNC: 4.4 MMOL/L (ref 3.5–5)
SODIUM BLD-SCNC: 139 MMOL/L (ref 132–146)
TOTAL PROTEIN: 6.8 G/DL (ref 6.4–8.3)

## 2018-06-12 PROCEDURE — 83036 HEMOGLOBIN GLYCOSYLATED A1C: CPT

## 2018-06-12 PROCEDURE — 80053 COMPREHEN METABOLIC PANEL: CPT

## 2018-06-13 ENCOUNTER — HOSPITAL ENCOUNTER (OUTPATIENT)
Age: 83
Discharge: HOME | End: 2018-06-15
Payer: MEDICARE

## 2018-06-13 PROCEDURE — 87077 CULTURE AEROBIC IDENTIFY: CPT

## 2018-06-13 PROCEDURE — 87088 URINE BACTERIA CULTURE: CPT

## 2018-06-13 PROCEDURE — 87186 SC STD MICRODIL/AGAR DIL: CPT

## 2018-06-14 ENCOUNTER — APPOINTMENT (OUTPATIENT)
Dept: CT IMAGING | Age: 83
End: 2018-06-14
Payer: MEDICARE

## 2018-06-14 ENCOUNTER — HOSPITAL ENCOUNTER (EMERGENCY)
Age: 83
Discharge: HOME | End: 2018-06-14
Attending: EMERGENCY MEDICINE
Payer: MEDICARE

## 2018-06-14 ENCOUNTER — APPOINTMENT (OUTPATIENT)
Dept: GENERAL RADIOLOGY | Age: 83
End: 2018-06-14
Payer: MEDICARE

## 2018-06-14 VITALS
OXYGEN SATURATION: 99 % | TEMPERATURE: 97.9 F | SYSTOLIC BLOOD PRESSURE: 126 MMHG | HEART RATE: 112 BPM | HEIGHT: 73 IN | WEIGHT: 170.31 LBS | BODY MASS INDEX: 22.57 KG/M2 | DIASTOLIC BLOOD PRESSURE: 89 MMHG | RESPIRATION RATE: 20 BRPM

## 2018-06-14 DIAGNOSIS — R41.0 CONFUSION: Primary | ICD-10-CM

## 2018-06-14 LAB
ALBUMIN SERPL-MCNC: 3.4 G/DL (ref 3.5–5.2)
ALP BLD-CCNC: 129 U/L (ref 40–129)
ALT SERPL-CCNC: 11 U/L (ref 0–40)
ANION GAP SERPL CALCULATED.3IONS-SCNC: 15 MMOL/L (ref 7–16)
AST SERPL-CCNC: 17 U/L (ref 0–39)
BACTERIA: ABNORMAL /HPF
BASOPHILS ABSOLUTE: 0.03 E9/L (ref 0–0.2)
BASOPHILS RELATIVE PERCENT: 0.3 % (ref 0–2)
BILIRUB SERPL-MCNC: 0.8 MG/DL (ref 0–1.2)
BILIRUBIN URINE: NEGATIVE
BLOOD, URINE: ABNORMAL
BUN BLDV-MCNC: 27 MG/DL (ref 8–23)
CALCIUM SERPL-MCNC: 9.3 MG/DL (ref 8.6–10.2)
CHLORIDE BLD-SCNC: 99 MMOL/L (ref 98–107)
CLARITY: CLEAR
CO2: 23 MMOL/L (ref 22–29)
COLOR: ABNORMAL
CREAT SERPL-MCNC: 1.2 MG/DL (ref 0.7–1.2)
EOSINOPHILS ABSOLUTE: 0.07 E9/L (ref 0.05–0.5)
EOSINOPHILS RELATIVE PERCENT: 0.8 % (ref 0–6)
GFR AFRICAN AMERICAN: >60
GFR NON-AFRICAN AMERICAN: 57 ML/MIN/1.73
GLUCOSE BLD-MCNC: 122 MG/DL (ref 74–109)
GLUCOSE URINE: NEGATIVE MG/DL
HCT VFR BLD CALC: 41.2 % (ref 37–54)
HEMOGLOBIN: 13.1 G/DL (ref 12.5–16.5)
IMMATURE GRANULOCYTES #: 0.05 E9/L
IMMATURE GRANULOCYTES %: 0.6 % (ref 0–5)
INR BLD: 2.1
KETONES, URINE: NEGATIVE MG/DL
LEUKOCYTE ESTERASE, URINE: ABNORMAL
LYMPHOCYTES ABSOLUTE: 1.6 E9/L (ref 1.5–4)
LYMPHOCYTES RELATIVE PERCENT: 18.4 % (ref 20–42)
MCH RBC QN AUTO: 26.7 PG (ref 26–35)
MCHC RBC AUTO-ENTMCNC: 31.8 % (ref 32–34.5)
MCV RBC AUTO: 84.1 FL (ref 80–99.9)
MONOCYTES ABSOLUTE: 1.32 E9/L (ref 0.1–0.95)
MONOCYTES RELATIVE PERCENT: 15.2 % (ref 2–12)
NEUTROPHILS ABSOLUTE: 5.63 E9/L (ref 1.8–7.3)
NEUTROPHILS RELATIVE PERCENT: 64.7 % (ref 43–80)
NITRITE, URINE: NEGATIVE
PDW BLD-RTO: 17.5 FL (ref 11.5–15)
PH UA: 7.5 (ref 5–9)
PLATELET # BLD: 140 E9/L (ref 130–450)
PMV BLD AUTO: 11.1 FL (ref 7–12)
POTASSIUM SERPL-SCNC: 5 MMOL/L (ref 3.5–5)
PROTEIN UA: NEGATIVE MG/DL
PROTHROMBIN TIME: 23.3 SEC (ref 9.3–12.4)
RBC # BLD: 4.9 E12/L (ref 3.8–5.8)
RBC UA: ABNORMAL /HPF (ref 0–2)
SODIUM BLD-SCNC: 137 MMOL/L (ref 132–146)
SPECIFIC GRAVITY UA: 1.01 (ref 1–1.03)
TOTAL PROTEIN: 7.4 G/DL (ref 6.4–8.3)
TROPONIN: 0.02 NG/ML (ref 0–0.03)
UROBILINOGEN, URINE: 0.2 E.U./DL
WBC # BLD: 8.7 E9/L (ref 4.5–11.5)
WBC UA: ABNORMAL /HPF (ref 0–5)

## 2018-06-14 PROCEDURE — 85025 COMPLETE CBC W/AUTO DIFF WBC: CPT

## 2018-06-14 PROCEDURE — 84484 ASSAY OF TROPONIN QUANT: CPT

## 2018-06-14 PROCEDURE — 87088 URINE BACTERIA CULTURE: CPT

## 2018-06-14 PROCEDURE — 99285 EMERGENCY DEPT VISIT HI MDM: CPT

## 2018-06-14 PROCEDURE — 70450 CT HEAD/BRAIN W/O DYE: CPT

## 2018-06-14 PROCEDURE — 94760 N-INVAS EAR/PLS OXIMETRY 1: CPT

## 2018-06-14 PROCEDURE — 36415 COLL VENOUS BLD VENIPUNCTURE: CPT

## 2018-06-14 PROCEDURE — 80053 COMPREHEN METABOLIC PANEL: CPT

## 2018-06-14 PROCEDURE — 71045 X-RAY EXAM CHEST 1 VIEW: CPT

## 2018-06-14 PROCEDURE — 81001 URINALYSIS AUTO W/SCOPE: CPT

## 2018-06-14 PROCEDURE — 85610 PROTHROMBIN TIME: CPT

## 2018-06-14 RX ORDER — DOCUSATE SODIUM 100 MG/1
100 CAPSULE, LIQUID FILLED ORAL 2 TIMES DAILY
COMMUNITY

## 2018-06-14 RX ORDER — POTASSIUM CHLORIDE 750 MG/1
20 CAPSULE, EXTENDED RELEASE ORAL DAILY
COMMUNITY

## 2018-06-14 RX ORDER — ACETAMINOPHEN 325 MG/1
650 TABLET ORAL EVERY 6 HOURS PRN
Status: ON HOLD | COMMUNITY
End: 2018-07-02 | Stop reason: HOSPADM

## 2018-06-14 ASSESSMENT — PAIN DESCRIPTION - LOCATION: LOCATION: SHOULDER

## 2018-06-14 ASSESSMENT — PAIN SCALES - GENERAL: PAINLEVEL_OUTOF10: 3

## 2018-06-14 ASSESSMENT — PAIN DESCRIPTION - FREQUENCY: FREQUENCY: INTERMITTENT

## 2018-06-14 ASSESSMENT — PAIN DESCRIPTION - ORIENTATION: ORIENTATION: LEFT

## 2018-06-14 ASSESSMENT — PAIN DESCRIPTION - DESCRIPTORS: DESCRIPTORS: ACHING

## 2018-06-16 LAB
ORGANISM: ABNORMAL
URINE CULTURE, ROUTINE: ABNORMAL
URINE CULTURE, ROUTINE: ABNORMAL
URINE CULTURE, ROUTINE: NORMAL

## 2018-06-22 LAB
EKG ATRIAL RATE: 94 BPM
EKG Q-T INTERVAL: 348 MS
EKG QRS DURATION: 76 MS
EKG QTC CALCULATION (BAZETT): 459 MS
EKG R AXIS: 23 DEGREES
EKG T AXIS: 33 DEGREES
EKG VENTRICULAR RATE: 105 BPM

## 2018-06-23 ENCOUNTER — HOSPITAL ENCOUNTER (EMERGENCY)
Age: 83
Discharge: HOME | End: 2018-06-23
Attending: EMERGENCY MEDICINE
Payer: MEDICARE

## 2018-06-23 VITALS
HEIGHT: 72 IN | SYSTOLIC BLOOD PRESSURE: 116 MMHG | HEART RATE: 76 BPM | BODY MASS INDEX: 20.99 KG/M2 | TEMPERATURE: 97.8 F | DIASTOLIC BLOOD PRESSURE: 69 MMHG | RESPIRATION RATE: 18 BRPM | WEIGHT: 155 LBS | OXYGEN SATURATION: 97 %

## 2018-06-23 DIAGNOSIS — N48.1 BALANITIS: Primary | ICD-10-CM

## 2018-06-23 PROCEDURE — 99282 EMERGENCY DEPT VISIT SF MDM: CPT

## 2018-06-23 RX ORDER — NYSTATIN 100000 U/G
CREAM TOPICAL
Qty: 15 G | Refills: 0 | Status: ON HOLD | OUTPATIENT
Start: 2018-06-23 | End: 2018-08-04 | Stop reason: ALTCHOICE

## 2018-06-27 ENCOUNTER — HOSPITAL ENCOUNTER (EMERGENCY)
Age: 83
Discharge: HOME | DRG: 698 | End: 2018-06-27
Payer: MEDICARE

## 2018-06-27 VITALS
SYSTOLIC BLOOD PRESSURE: 148 MMHG | RESPIRATION RATE: 18 BRPM | TEMPERATURE: 97.9 F | BODY MASS INDEX: 21.2 KG/M2 | DIASTOLIC BLOOD PRESSURE: 86 MMHG | OXYGEN SATURATION: 94 % | HEIGHT: 73 IN | WEIGHT: 160 LBS | HEART RATE: 79 BPM

## 2018-06-27 DIAGNOSIS — N48.1 BALANITIS: ICD-10-CM

## 2018-06-27 DIAGNOSIS — N32.89 BLADDER SPASM: Primary | ICD-10-CM

## 2018-06-27 DIAGNOSIS — L89.151 DECUBITUS ULCER OF SACRAL REGION, STAGE 1: ICD-10-CM

## 2018-06-27 LAB
ANION GAP SERPL CALCULATED.3IONS-SCNC: 15 MMOL/L (ref 7–16)
BACTERIA: NORMAL /HPF
BASOPHILS ABSOLUTE: 0.04 E9/L (ref 0–0.2)
BASOPHILS RELATIVE PERCENT: 0.5 % (ref 0–2)
BILIRUBIN URINE: NEGATIVE
BLOOD, URINE: ABNORMAL
BUN BLDV-MCNC: 25 MG/DL (ref 8–23)
CALCIUM SERPL-MCNC: 9.4 MG/DL (ref 8.6–10.2)
CHLORIDE BLD-SCNC: 101 MMOL/L (ref 98–107)
CLARITY: CLEAR
CO2: 24 MMOL/L (ref 22–29)
COLOR: YELLOW
CREAT SERPL-MCNC: 1.4 MG/DL (ref 0.7–1.2)
EOSINOPHILS ABSOLUTE: 0.1 E9/L (ref 0.05–0.5)
EOSINOPHILS RELATIVE PERCENT: 1.3 % (ref 0–6)
GFR AFRICAN AMERICAN: 57
GFR NON-AFRICAN AMERICAN: 47 ML/MIN/1.73
GLUCOSE BLD-MCNC: 108 MG/DL (ref 74–109)
GLUCOSE URINE: NEGATIVE MG/DL
HCT VFR BLD CALC: 41 % (ref 37–54)
HEMOGLOBIN: 13 G/DL (ref 12.5–16.5)
IMMATURE GRANULOCYTES #: 0.14 E9/L
IMMATURE GRANULOCYTES %: 1.8 % (ref 0–5)
INR BLD: 2.4
KETONES, URINE: NEGATIVE MG/DL
LEUKOCYTE ESTERASE, URINE: ABNORMAL
LYMPHOCYTES ABSOLUTE: 1.88 E9/L (ref 1.5–4)
LYMPHOCYTES RELATIVE PERCENT: 23.7 % (ref 20–42)
MCH RBC QN AUTO: 26.4 PG (ref 26–35)
MCHC RBC AUTO-ENTMCNC: 31.7 % (ref 32–34.5)
MCV RBC AUTO: 83.2 FL (ref 80–99.9)
MONOCYTES ABSOLUTE: 1.02 E9/L (ref 0.1–0.95)
MONOCYTES RELATIVE PERCENT: 12.8 % (ref 2–12)
NEUTROPHILS ABSOLUTE: 4.76 E9/L (ref 1.8–7.3)
NEUTROPHILS RELATIVE PERCENT: 59.9 % (ref 43–80)
NITRITE, URINE: NEGATIVE
PDW BLD-RTO: 16.9 FL (ref 11.5–15)
PH UA: 6 (ref 5–9)
PLATELET # BLD: 180 E9/L (ref 130–450)
PMV BLD AUTO: 11.2 FL (ref 7–12)
POTASSIUM SERPL-SCNC: 4.4 MMOL/L (ref 3.5–5)
PROTEIN UA: 100 MG/DL
PROTHROMBIN TIME: 27.2 SEC (ref 9.3–12.4)
RBC # BLD: 4.93 E12/L (ref 3.8–5.8)
RBC UA: NORMAL /HPF (ref 0–2)
SODIUM BLD-SCNC: 140 MMOL/L (ref 132–146)
SPECIFIC GRAVITY UA: 1.02 (ref 1–1.03)
UROBILINOGEN, URINE: 0.2 E.U./DL
WBC # BLD: 7.9 E9/L (ref 4.5–11.5)
WBC UA: NORMAL /HPF (ref 0–5)

## 2018-06-27 PROCEDURE — 36415 COLL VENOUS BLD VENIPUNCTURE: CPT

## 2018-06-27 PROCEDURE — 85025 COMPLETE CBC W/AUTO DIFF WBC: CPT

## 2018-06-27 PROCEDURE — 99283 EMERGENCY DEPT VISIT LOW MDM: CPT

## 2018-06-27 PROCEDURE — 87088 URINE BACTERIA CULTURE: CPT

## 2018-06-27 PROCEDURE — 85610 PROTHROMBIN TIME: CPT

## 2018-06-27 PROCEDURE — 87186 SC STD MICRODIL/AGAR DIL: CPT

## 2018-06-27 PROCEDURE — 81001 URINALYSIS AUTO W/SCOPE: CPT

## 2018-06-27 PROCEDURE — 51702 INSERT TEMP BLADDER CATH: CPT

## 2018-06-27 PROCEDURE — 80048 BASIC METABOLIC PNL TOTAL CA: CPT

## 2018-06-27 PROCEDURE — 87077 CULTURE AEROBIC IDENTIFY: CPT

## 2018-06-27 ASSESSMENT — PAIN DESCRIPTION - PAIN TYPE: TYPE: ACUTE PAIN

## 2018-06-27 ASSESSMENT — PAIN DESCRIPTION - LOCATION: LOCATION: PENIS

## 2018-06-27 ASSESSMENT — PAIN DESCRIPTION - DESCRIPTORS: DESCRIPTORS: BURNING

## 2018-06-27 ASSESSMENT — PAIN DESCRIPTION - FREQUENCY: FREQUENCY: CONTINUOUS

## 2018-06-27 ASSESSMENT — PAIN SCALES - GENERAL: PAINLEVEL_OUTOF10: 10

## 2018-06-29 ENCOUNTER — HOSPITAL ENCOUNTER (INPATIENT)
Age: 83
LOS: 3 days | Discharge: NURSING, REHAB OR LONG-TERM CARE | DRG: 698 | End: 2018-07-02
Attending: EMERGENCY MEDICINE | Admitting: INTERNAL MEDICINE
Payer: MEDICARE

## 2018-06-29 DIAGNOSIS — R26.2 INABILITY TO AMBULATE DUE TO KNEE: ICD-10-CM

## 2018-06-29 DIAGNOSIS — Z79.01 WARFARIN ANTICOAGULATION: ICD-10-CM

## 2018-06-29 DIAGNOSIS — N30.01 ACUTE CYSTITIS WITH HEMATURIA: Primary | ICD-10-CM

## 2018-06-29 PROBLEM — R31.9 HEMATURIA: Status: ACTIVE | Noted: 2018-06-29

## 2018-06-29 LAB
ANION GAP SERPL CALCULATED.3IONS-SCNC: 14 MMOL/L (ref 7–16)
BACTERIA: ABNORMAL /HPF
BASOPHILS ABSOLUTE: 0.04 E9/L (ref 0–0.2)
BASOPHILS RELATIVE PERCENT: 0.5 % (ref 0–2)
BILIRUBIN URINE: ABNORMAL
BLOOD, URINE: ABNORMAL
BUN BLDV-MCNC: 32 MG/DL (ref 8–23)
CALCIUM SERPL-MCNC: 9.5 MG/DL (ref 8.6–10.2)
CHLORIDE BLD-SCNC: 99 MMOL/L (ref 98–107)
CLARITY: ABNORMAL
CO2: 22 MMOL/L (ref 22–29)
COLOR: ABNORMAL
CREAT SERPL-MCNC: 1.6 MG/DL (ref 0.7–1.2)
EOSINOPHILS ABSOLUTE: 0.09 E9/L (ref 0.05–0.5)
EOSINOPHILS RELATIVE PERCENT: 1.1 % (ref 0–6)
EPITHELIAL CELLS, UA: ABNORMAL /HPF
GFR AFRICAN AMERICAN: 49
GFR NON-AFRICAN AMERICAN: 41 ML/MIN/1.73
GLUCOSE BLD-MCNC: 168 MG/DL (ref 74–109)
GLUCOSE URINE: 100 MG/DL
HCT VFR BLD CALC: 39.5 % (ref 37–54)
HEMOGLOBIN: 12.7 G/DL (ref 12.5–16.5)
IMMATURE GRANULOCYTES #: 0.09 E9/L
IMMATURE GRANULOCYTES %: 1.1 % (ref 0–5)
INR BLD: 2.4
KETONES, URINE: 15 MG/DL
LEUKOCYTE ESTERASE, URINE: ABNORMAL
LYMPHOCYTES ABSOLUTE: 2.09 E9/L (ref 1.5–4)
LYMPHOCYTES RELATIVE PERCENT: 26.3 % (ref 20–42)
MCH RBC QN AUTO: 26.6 PG (ref 26–35)
MCHC RBC AUTO-ENTMCNC: 32.2 % (ref 32–34.5)
MCV RBC AUTO: 82.6 FL (ref 80–99.9)
MONOCYTES ABSOLUTE: 1.15 E9/L (ref 0.1–0.95)
MONOCYTES RELATIVE PERCENT: 14.4 % (ref 2–12)
NEUTROPHILS ABSOLUTE: 4.5 E9/L (ref 1.8–7.3)
NEUTROPHILS RELATIVE PERCENT: 56.6 % (ref 43–80)
NITRITE, URINE: POSITIVE
ORGANISM: ABNORMAL
PDW BLD-RTO: 16.9 FL (ref 11.5–15)
PH UA: 5 (ref 5–9)
PLATELET # BLD: 185 E9/L (ref 130–450)
PMV BLD AUTO: 11 FL (ref 7–12)
POTASSIUM SERPL-SCNC: 4.2 MMOL/L (ref 3.5–5)
PROTEIN UA: >=300 MG/DL
PROTHROMBIN TIME: 26.6 SEC (ref 9.3–12.4)
RBC # BLD: 4.78 E12/L (ref 3.8–5.8)
RBC UA: >20 /HPF (ref 0–2)
SODIUM BLD-SCNC: 135 MMOL/L (ref 132–146)
SPECIFIC GRAVITY UA: 1.02 (ref 1–1.03)
URINE CULTURE, ROUTINE: ABNORMAL
URINE CULTURE, ROUTINE: ABNORMAL
UROBILINOGEN, URINE: 2 E.U./DL
WBC # BLD: 8 E9/L (ref 4.5–11.5)
WBC UA: ABNORMAL /HPF (ref 0–5)

## 2018-06-29 PROCEDURE — 87186 SC STD MICRODIL/AGAR DIL: CPT

## 2018-06-29 PROCEDURE — 2580000003 HC RX 258: Performed by: EMERGENCY MEDICINE

## 2018-06-29 PROCEDURE — 87088 URINE BACTERIA CULTURE: CPT

## 2018-06-29 PROCEDURE — 85610 PROTHROMBIN TIME: CPT

## 2018-06-29 PROCEDURE — 6360000002 HC RX W HCPCS: Performed by: EMERGENCY MEDICINE

## 2018-06-29 PROCEDURE — 85025 COMPLETE CBC W/AUTO DIFF WBC: CPT

## 2018-06-29 PROCEDURE — 6370000000 HC RX 637 (ALT 250 FOR IP): Performed by: INTERNAL MEDICINE

## 2018-06-29 PROCEDURE — 81001 URINALYSIS AUTO W/SCOPE: CPT

## 2018-06-29 PROCEDURE — 2580000003 HC RX 258: Performed by: INTERNAL MEDICINE

## 2018-06-29 PROCEDURE — 80048 BASIC METABOLIC PNL TOTAL CA: CPT

## 2018-06-29 PROCEDURE — 36415 COLL VENOUS BLD VENIPUNCTURE: CPT

## 2018-06-29 PROCEDURE — 1200000000 HC SEMI PRIVATE

## 2018-06-29 PROCEDURE — 99284 EMERGENCY DEPT VISIT MOD MDM: CPT

## 2018-06-29 PROCEDURE — 6370000000 HC RX 637 (ALT 250 FOR IP): Performed by: EMERGENCY MEDICINE

## 2018-06-29 RX ORDER — SODIUM CHLORIDE 9 MG/ML
INJECTION, SOLUTION INTRAVENOUS CONTINUOUS
Status: DISCONTINUED | OUTPATIENT
Start: 2018-06-29 | End: 2018-07-02 | Stop reason: HOSPADM

## 2018-06-29 RX ORDER — FUROSEMIDE 40 MG/1
40 TABLET ORAL DAILY
Status: DISCONTINUED | OUTPATIENT
Start: 2018-06-30 | End: 2018-07-02 | Stop reason: HOSPADM

## 2018-06-29 RX ORDER — SIMVASTATIN 40 MG
40 TABLET ORAL NIGHTLY
Status: DISCONTINUED | OUTPATIENT
Start: 2018-06-29 | End: 2018-07-02 | Stop reason: HOSPADM

## 2018-06-29 RX ORDER — TAMSULOSIN HYDROCHLORIDE 0.4 MG/1
0.4 CAPSULE ORAL DAILY
Status: DISCONTINUED | OUTPATIENT
Start: 2018-06-30 | End: 2018-07-02 | Stop reason: HOSPADM

## 2018-06-29 RX ORDER — DOCUSATE SODIUM 100 MG/1
100 CAPSULE, LIQUID FILLED ORAL 2 TIMES DAILY
Status: DISCONTINUED | OUTPATIENT
Start: 2018-06-29 | End: 2018-07-02 | Stop reason: HOSPADM

## 2018-06-29 RX ORDER — ATENOLOL 25 MG/1
25 TABLET ORAL NIGHTLY
Status: DISCONTINUED | OUTPATIENT
Start: 2018-06-29 | End: 2018-07-02 | Stop reason: HOSPADM

## 2018-06-29 RX ORDER — FINASTERIDE 5 MG/1
5 TABLET, FILM COATED ORAL DAILY
Status: DISCONTINUED | OUTPATIENT
Start: 2018-06-30 | End: 2018-07-02 | Stop reason: HOSPADM

## 2018-06-29 RX ORDER — POTASSIUM CHLORIDE 750 MG/1
10 TABLET, EXTENDED RELEASE ORAL 2 TIMES DAILY
Status: DISCONTINUED | OUTPATIENT
Start: 2018-06-29 | End: 2018-07-02 | Stop reason: HOSPADM

## 2018-06-29 RX ORDER — WARFARIN SODIUM 3 MG/1
3 TABLET ORAL DAILY
Status: DISCONTINUED | OUTPATIENT
Start: 2018-06-29 | End: 2018-07-02 | Stop reason: HOSPADM

## 2018-06-29 RX ORDER — LEVOTHYROXINE SODIUM 0.1 MG/1
100 TABLET ORAL DAILY
Status: DISCONTINUED | OUTPATIENT
Start: 2018-06-30 | End: 2018-07-02 | Stop reason: HOSPADM

## 2018-06-29 RX ADMIN — ATENOLOL 25 MG: 25 TABLET ORAL at 22:53

## 2018-06-29 RX ADMIN — CEFTRIAXONE 2 G: 2 INJECTION, POWDER, FOR SOLUTION INTRAMUSCULAR; INTRAVENOUS at 20:07

## 2018-06-29 RX ADMIN — LIDOCAINE HYDROCHLORIDE: 20 JELLY TOPICAL at 20:07

## 2018-06-29 RX ADMIN — SODIUM CHLORIDE: 9 INJECTION, SOLUTION INTRAVENOUS at 22:49

## 2018-06-29 ASSESSMENT — ENCOUNTER SYMPTOMS
NAUSEA: 0
CHEST TIGHTNESS: 0
ABDOMINAL PAIN: 0
COUGH: 0
WHEEZING: 0
SINUS PRESSURE: 0
BACK PAIN: 0
ABDOMINAL DISTENTION: 0
SORE THROAT: 0
SHORTNESS OF BREATH: 0
DIARRHEA: 0
VOMITING: 0

## 2018-06-29 ASSESSMENT — PAIN SCALES - GENERAL: PAINLEVEL_OUTOF10: 0

## 2018-06-29 NOTE — H&P
Temp: 98.1 °F (36.7 °C), Pulse: 82, Resp: 18, SpO2: 97 %  Labs-   Lab Results   Component Value Date    WBC 8.0 06/29/2018    HGB 12.7 06/29/2018    HCT 39.5 06/29/2018     06/29/2018     06/29/2018    K 4.2 06/29/2018    CL 99 06/29/2018    CREATININE 1.6 (H) 06/29/2018    BUN 32 (H) 06/29/2018    CO2 22 06/29/2018    GLUCOSE 168 (H) 06/29/2018    ALT 11 06/14/2018    AST 17 06/14/2018    INR 2.4 06/29/2018     Lab Results   Component Value Date    CKTOTAL 410 (H) 03/25/2018    CKMB 2.7 03/25/2018    TROPONINI 0.02 06/14/2018     No results for input(s): BNP in the last 72 hours. Radiology-  Ct Head Wo Contrast    Result Date: 6/14/2018  LOCATION: 200 EXAM: CT HEAD WO CONTRAST COMPARISON: None HISTORY: confusion TECHNIQUE: Noncontrast helical CT images were performed of the head. Coronal and sagittal reconstructions also obtained. Automated dose control was used for this exam. CONTRAST: No intravenous contrast administered. FINDINGS: HEAD: There is moderate diffuse atrophy. A moderate amount of patchy periventricular and subcortical white matter hypodensity noted, likely chronic microvascular ischemic related. There is no evidence of intracranial hemorrhage or mass. No extra-axial fluid is seen. The paranasal sinuses are clear. The globes and orbits are normal.  No abnormalities of the calvarium are identified. 1. No acute findings. Xr Chest Portable    Result Date: 6/14/2018  LOCATION: 200 EXAM: XR CHEST PORTABLE COMPARISON: March 21, 2018 HISTORY: Shortness of breath TECHNIQUE: Single frontal view of the chest was obtained. FINDINGS:  SUPPORT DEVICES: None. LUNGS: Clear with no areas of consolidation. PLEURA: No effusions or pneumothorax. LUNG VOLUMES: Satisfactory inspirator effort. MEDIASTINAL STRUCTURES: No lymphadenopathy. Normal aortic contour. HEART SIZE: Normal. BONES AND SOFT TISSUES: No fracture or soft tissue abnormality.      1. Negative portable chest.        EKG-   Paced

## 2018-06-30 PROBLEM — E43 SEVERE PROTEIN-CALORIE MALNUTRITION (HCC): Status: ACTIVE | Noted: 2018-06-30

## 2018-06-30 LAB
ALBUMIN SERPL-MCNC: 3.4 G/DL (ref 3.5–5.2)
ALP BLD-CCNC: 116 U/L (ref 40–129)
ALT SERPL-CCNC: 10 U/L (ref 0–40)
ANION GAP SERPL CALCULATED.3IONS-SCNC: 12 MMOL/L (ref 7–16)
AST SERPL-CCNC: 14 U/L (ref 0–39)
BASOPHILS ABSOLUTE: 0.05 E9/L (ref 0–0.2)
BASOPHILS RELATIVE PERCENT: 0.6 % (ref 0–2)
BILIRUB SERPL-MCNC: 0.4 MG/DL (ref 0–1.2)
BUN BLDV-MCNC: 30 MG/DL (ref 8–23)
CALCIUM SERPL-MCNC: 9.3 MG/DL (ref 8.6–10.2)
CHLORIDE BLD-SCNC: 103 MMOL/L (ref 98–107)
CHOLESTEROL, TOTAL: 100 MG/DL (ref 0–199)
CO2: 22 MMOL/L (ref 22–29)
CREAT SERPL-MCNC: 1.5 MG/DL (ref 0.7–1.2)
EOSINOPHILS ABSOLUTE: 0.13 E9/L (ref 0.05–0.5)
EOSINOPHILS RELATIVE PERCENT: 1.5 % (ref 0–6)
GFR AFRICAN AMERICAN: 53
GFR NON-AFRICAN AMERICAN: 44 ML/MIN/1.73
GLUCOSE BLD-MCNC: 121 MG/DL (ref 74–109)
HBA1C MFR BLD: 6.9 % (ref 4–5.6)
HCT VFR BLD CALC: 38.2 % (ref 37–54)
HDLC SERPL-MCNC: 43 MG/DL
HEMOGLOBIN: 12.2 G/DL (ref 12.5–16.5)
IMMATURE GRANULOCYTES #: 0.11 E9/L
IMMATURE GRANULOCYTES %: 1.3 % (ref 0–5)
LDL CHOLESTEROL CALCULATED: 46 MG/DL (ref 0–99)
LYMPHOCYTES ABSOLUTE: 1.81 E9/L (ref 1.5–4)
LYMPHOCYTES RELATIVE PERCENT: 21.3 % (ref 20–42)
MAGNESIUM: 2 MG/DL (ref 1.6–2.6)
MCH RBC QN AUTO: 26.8 PG (ref 26–35)
MCHC RBC AUTO-ENTMCNC: 31.9 % (ref 32–34.5)
MCV RBC AUTO: 84 FL (ref 80–99.9)
METER GLUCOSE: 121 MG/DL (ref 70–110)
MONOCYTES ABSOLUTE: 1.28 E9/L (ref 0.1–0.95)
MONOCYTES RELATIVE PERCENT: 15.1 % (ref 2–12)
NEUTROPHILS ABSOLUTE: 5.12 E9/L (ref 1.8–7.3)
NEUTROPHILS RELATIVE PERCENT: 60.2 % (ref 43–80)
PDW BLD-RTO: 17 FL (ref 11.5–15)
PHOSPHORUS: 3.6 MG/DL (ref 2.5–4.5)
PLATELET # BLD: 167 E9/L (ref 130–450)
PMV BLD AUTO: 12.1 FL (ref 7–12)
POTASSIUM SERPL-SCNC: 4.1 MMOL/L (ref 3.5–5)
RBC # BLD: 4.55 E12/L (ref 3.8–5.8)
SODIUM BLD-SCNC: 137 MMOL/L (ref 132–146)
TOTAL PROTEIN: 6.9 G/DL (ref 6.4–8.3)
TRIGL SERPL-MCNC: 54 MG/DL (ref 0–149)
VLDLC SERPL CALC-MCNC: 11 MG/DL
WBC # BLD: 8.5 E9/L (ref 4.5–11.5)

## 2018-06-30 PROCEDURE — 80061 LIPID PANEL: CPT

## 2018-06-30 PROCEDURE — 83036 HEMOGLOBIN GLYCOSYLATED A1C: CPT

## 2018-06-30 PROCEDURE — 82962 GLUCOSE BLOOD TEST: CPT

## 2018-06-30 PROCEDURE — 1200000000 HC SEMI PRIVATE

## 2018-06-30 PROCEDURE — 85025 COMPLETE CBC W/AUTO DIFF WBC: CPT

## 2018-06-30 PROCEDURE — 97162 PT EVAL MOD COMPLEX 30 MIN: CPT | Performed by: PHYSICAL THERAPIST

## 2018-06-30 PROCEDURE — 36415 COLL VENOUS BLD VENIPUNCTURE: CPT

## 2018-06-30 PROCEDURE — 84100 ASSAY OF PHOSPHORUS: CPT

## 2018-06-30 PROCEDURE — 80053 COMPREHEN METABOLIC PANEL: CPT

## 2018-06-30 PROCEDURE — G8979 MOBILITY GOAL STATUS: HCPCS | Performed by: PHYSICAL THERAPIST

## 2018-06-30 PROCEDURE — G8978 MOBILITY CURRENT STATUS: HCPCS | Performed by: PHYSICAL THERAPIST

## 2018-06-30 PROCEDURE — 97116 GAIT TRAINING THERAPY: CPT | Performed by: PHYSICAL THERAPIST

## 2018-06-30 PROCEDURE — 6370000000 HC RX 637 (ALT 250 FOR IP): Performed by: INTERNAL MEDICINE

## 2018-06-30 PROCEDURE — 83735 ASSAY OF MAGNESIUM: CPT

## 2018-06-30 RX ORDER — WARFARIN SODIUM 4 MG/1
3 TABLET ORAL
COMMUNITY

## 2018-06-30 RX ORDER — CIPROFLOXACIN 500 MG/1
500 TABLET, FILM COATED ORAL 2 TIMES DAILY
Status: ON HOLD | COMMUNITY
End: 2018-07-02 | Stop reason: HOSPADM

## 2018-06-30 RX ORDER — GLIPIZIDE 5 MG/1
5 TABLET, FILM COATED, EXTENDED RELEASE ORAL DAILY
Status: ON HOLD | COMMUNITY
End: 2018-08-09

## 2018-06-30 RX ADMIN — TAMSULOSIN HYDROCHLORIDE 0.4 MG: 0.4 CAPSULE ORAL at 09:49

## 2018-06-30 RX ADMIN — LEVOTHYROXINE SODIUM 100 MCG: 100 TABLET ORAL at 06:13

## 2018-06-30 RX ADMIN — POTASSIUM CHLORIDE 10 MEQ: 10 TABLET, EXTENDED RELEASE ORAL at 09:49

## 2018-06-30 RX ADMIN — DOCUSATE SODIUM 100 MG: 100 CAPSULE, LIQUID FILLED ORAL at 09:49

## 2018-06-30 RX ADMIN — SIMVASTATIN 40 MG: 40 TABLET, FILM COATED ORAL at 21:55

## 2018-06-30 RX ADMIN — DOCUSATE SODIUM 100 MG: 100 CAPSULE, LIQUID FILLED ORAL at 21:55

## 2018-06-30 RX ADMIN — FUROSEMIDE 40 MG: 40 TABLET ORAL at 09:49

## 2018-06-30 RX ADMIN — FINASTERIDE 5 MG: 5 TABLET, FILM COATED ORAL at 09:49

## 2018-06-30 RX ADMIN — WARFARIN SODIUM 3 MG: 3 TABLET ORAL at 18:30

## 2018-06-30 RX ADMIN — POTASSIUM CHLORIDE 10 MEQ: 10 TABLET, EXTENDED RELEASE ORAL at 21:55

## 2018-06-30 ASSESSMENT — PAIN SCALES - GENERAL
PAINLEVEL_OUTOF10: 0
PAINLEVEL_OUTOF10: 0

## 2018-06-30 NOTE — PROGRESS NOTES
Physical Therapy  [id number]/[id number]    PHYSICAL THERAPY INITIAL EVALUATION  Tentative placement recommendation: [facility] vs HHPT  Equipment prescriptions needed: none  Plan of care: Patient will be seen  daily for therapeutic exercise, functional retraining, endurance activities, balance exercises, family and patient education. AM-PAC Basic Mobility        AM-PAC Basic Mobility       AM-PAC Mobility Inpatient   How much difficulty turning over in bed?: A Little  How much difficulty sitting down on / standing up from a chair with arms?: A Lot  How much difficulty moving from lying on back to sitting on side of bed?: A Lot  How much help from another person moving to and from a bed to a chair?: A Lot  How much help from another person needed to walk in hospital room?: A Lot  How much help from another person for climbing 3-5 steps with a railing?: A Lot  AM-PAC Inpatient Mobility Raw Score : 13  AM-PAC Inpatient T-Scale Score : 36.74  Mobility Inpatient CMS 0-100% Score: 64.91  Mobility Inpatient CMS G-Code Modifier : CL    Order: evaluate and treat  Diagnosis:    1. Acute cystitis with hematuria    2. Warfarin anticoagulation      Past medical history:       Diagnosis Date    A-fib [facility])     Arthritis     CAD (coronary artery disease)     Cancer ([facility] [address]     Depression     Hyperlipidemia     Hypertension     Thyroid disease    ;      Procedure Laterality Date    HERNIA REPAIR      KNEE ARTHROSCOPY Left 7-1-13    debridement and medial menisectomy    PACEMAKER PLACEMENT  03/27/2018    Single chamber pacemaker    WA FEMORAL FX, OPEN TX Left 3/22/2018    LEFT HIP OPEN REDUCTION INTERNAL FIXATION performed by Massiel Link DO at [address] [place] [address]         The admitting diagnosis and active problem list as listed above have been reviewed prior to the initiation of this evaluation. Nursing cleared the patient for evaluation. Patient is agreeable to evaluation.     Precautions: falls Social history: Patient lives with family wife  in a single 3 story  family home withfirst floor set up, stairlift No steps to enter . 24/7 care       Equipment owned: straight cane, walker, wheelchair, shower chair and tub transfer bench,     Mentation: alert, cooperative, oriented x 3 and follows directions,   Prior level of function: Has been taking a few steps with FWW with HHPT lately. ROM: within functional limits   STRENGTH: LLE= 3-/5, RLE= 4-/5  PAIN: (measured on a visual analog scale with 0=no pain and 10=excruciating pain) 5/10. FUNCTIONAL ASSESSMENT   Bed Mobility- Supine to sit-Minimal assists      Scooting- Moderate assistance x2     Sit to supine- Moderate assistance    Patient able to dangle on edge of bed for 10 minutes with no assist   Transfers-sit to stand- Moderate assistance x2. Stood for 1 minx1, stood for 20 sec x1   Gait:  NT      Balance: sit-good       stand poor    Edema: no  Endurance: fair      Treatment:evaluation;  Stood x 2  Patient/family education:effects of immobilty     Rehab Potential: fair  for baseline  Patients Goal: none stated  ASSESSMENT  Patient exhibits decreased rom, strength, endurance,  balance, coordination impairing functional mobility. Goals to be met in 1-3 days. Bed mobility-Independent  Transfers-Minimal assists   Ambulation-Moderate assistance x1 for 20 feet using   wheeled walker      Comments: Daughter present     Increase strength in affected muscle groups by 1/3 grade  Increase balance to allow for improvement towards functional goals. Increase endurance to allow for improvement towards functional goals.    Bayron Noe PT

## 2018-06-30 NOTE — PLAN OF CARE
Problem: Nutrition  Goal: Optimal nutrition therapy  Outcome: Ongoing  Nutrition Problem: Severe malnutrition, in context of acute illness or injury  Intervention: Food and/or Nutrient Delivery: Continue current diet, Start ONS (chocolate ensure enlive BID)  Nutritional Goals: Intake 75% or more most meals and supplements

## 2018-06-30 NOTE — PROGRESS NOTES
bruising. Genitourinary: Spann catheter. No pain or pressure. Musculoskeletal:  Negative for myalgias, back pain and arthralgias      Allergy:  No Known Allergies     Medication:  Scheduled Meds:   atenolol  25 mg Oral Nightly    docusate sodium  100 mg Oral BID    finasteride  5 mg Oral Daily    furosemide  40 mg Oral Daily    levothyroxine  100 mcg Oral Daily    Mirabegron ER  25 mg Oral Daily    potassium chloride  10 mEq Oral BID    simvastatin  40 mg Oral Nightly    tamsulosin  0.4 mg Oral Daily    warfarin  3 mg Oral Daily    cefTRIAXone (ROCEPHIN) IV  1 g Intravenous Q24H     Continuous Infusions:   sodium chloride 75 mL/hr at 06/29/18 2249       Objective Data:  CBC:   Recent Labs      06/29/18   1845  06/30/18 0617   WBC  8.0  8.5   HGB  12.7  12.2*   PLT  185  167     BMP:  Recent Labs      06/29/18   1845  06/30/18   0617   NA  135  137   K  4.2  4.1   CL  99  103   CO2  22  22   BUN  32*  30*   CREATININE  1.6*  1.5*   GLUCOSE  168*  121*     CMP:  Lab Results   Component Value Date     06/30/2018    K 4.1 06/30/2018     06/30/2018    CO2 22 06/30/2018    BUN 30 06/30/2018    CREATININE 1.5 06/30/2018    GFRAA 53 06/30/2018    LABGLOM 44 06/30/2018    GLUCOSE 121 06/30/2018    PROT 6.9 06/30/2018    LABALBU 3.4 06/30/2018    CALCIUM 9.3 06/30/2018    BILITOT 0.4 06/30/2018    ALKPHOS 116 06/30/2018    AST 14 06/30/2018    ALT 10 06/30/2018     Hepatic: Recent Labs      06/30/18 0617   AST  14   ALT  10   BILITOT  0.4   ALKPHOS  116     Troponin: No results for input(s): TROPONINI in the last 72 hours. BNP: No results for input(s): BNP in the last 72 hours. Lipids:   Recent Labs      06/30/18 0617   CHOL  100   HDL  43     ABGs: No results found for: PHART, PO2ART, HVJ2SNS  INR:   Recent Labs      06/29/18   1845   INR  2.4   PROTIME  26.6*     RAD: No results found.     Physical Exam:  I/O this shift:  In: 360 [P.O.:360]  Out: 1200 [Urine:1200]    Intake/Output Summary

## 2018-06-30 NOTE — PROGRESS NOTES
Body Wt: 199 lb (90.3 kg) (198-200# stated per pt, verified by EMR review)  · % Weight Change: 14%,  6 months  · Ideal Body Wt: 184 lb (83.5 kg), % Ideal Body 93%  · BMI Classification: BMI 18.5 - 24.9 Normal Weight  · Comparative Standards (Estimated Nutrition Needs):  · Estimated Daily Total Kcal: 5917-5097  · Estimated Daily Protein (g):     Estimated Intake vs Estimated Needs: Insufficient Data    Nutrition Risk Level: High    Nutrition Interventions:   Continue current diet, Start ONS (chocolate ensure enlive BID)  Continued Inpatient Monitoring, Education Not Indicated, No recommendations at this time    Nutrition Evaluation:   · Evaluation: Goals set   · Goals: Intake 75% or more most meals and supplements    · Monitoring: Meal Intake, Supplement Intake, Diet Tolerance, Skin Integrity, Wound Healing, Fluid Balance, Ascites/Edema, Weight, Comparative Standards, Pertinent Labs    See Adult Nutrition Doc Flowsheet for more detail.      Electronically signed by Sheryle Peter, RD, LD on 6/30/18 at 11:26 AM    Contact Number: Sigifredo Reynolds. [phone]

## 2018-07-01 ENCOUNTER — APPOINTMENT (OUTPATIENT)
Dept: GENERAL RADIOLOGY | Age: 83
DRG: 698 | End: 2018-07-01
Payer: MEDICARE

## 2018-07-01 PROBLEM — N39.0 URINARY TRACT INFECTION: Status: ACTIVE | Noted: 2018-07-01

## 2018-07-01 LAB
ALBUMIN SERPL-MCNC: 3.1 G/DL (ref 3.5–5.2)
ALP BLD-CCNC: 114 U/L (ref 40–129)
ALT SERPL-CCNC: 8 U/L (ref 0–40)
ANION GAP SERPL CALCULATED.3IONS-SCNC: 12 MMOL/L (ref 7–16)
AST SERPL-CCNC: 22 U/L (ref 0–39)
BASOPHILS ABSOLUTE: 0.04 E9/L (ref 0–0.2)
BASOPHILS RELATIVE PERCENT: 0.5 % (ref 0–2)
BILIRUB SERPL-MCNC: 0.5 MG/DL (ref 0–1.2)
BUN BLDV-MCNC: 21 MG/DL (ref 8–23)
CALCIUM SERPL-MCNC: 8.7 MG/DL (ref 8.6–10.2)
CHLORIDE BLD-SCNC: 105 MMOL/L (ref 98–107)
CO2: 21 MMOL/L (ref 22–29)
CREAT SERPL-MCNC: 1.3 MG/DL (ref 0.7–1.2)
EOSINOPHILS ABSOLUTE: 0.09 E9/L (ref 0.05–0.5)
EOSINOPHILS RELATIVE PERCENT: 1.2 % (ref 0–6)
GFR AFRICAN AMERICAN: >60
GFR NON-AFRICAN AMERICAN: 52 ML/MIN/1.73
GLUCOSE BLD-MCNC: 147 MG/DL (ref 74–109)
HCT VFR BLD CALC: 36.1 % (ref 37–54)
HEMOGLOBIN: 11.6 G/DL (ref 12.5–16.5)
IMMATURE GRANULOCYTES #: 0.06 E9/L
IMMATURE GRANULOCYTES %: 0.8 % (ref 0–5)
INR BLD: 2.4
LYMPHOCYTES ABSOLUTE: 1.78 E9/L (ref 1.5–4)
LYMPHOCYTES RELATIVE PERCENT: 24 % (ref 20–42)
MCH RBC QN AUTO: 26.7 PG (ref 26–35)
MCHC RBC AUTO-ENTMCNC: 32.1 % (ref 32–34.5)
MCV RBC AUTO: 83.2 FL (ref 80–99.9)
MONOCYTES ABSOLUTE: 1.13 E9/L (ref 0.1–0.95)
MONOCYTES RELATIVE PERCENT: 15.2 % (ref 2–12)
NEUTROPHILS ABSOLUTE: 4.33 E9/L (ref 1.8–7.3)
NEUTROPHILS RELATIVE PERCENT: 58.3 % (ref 43–80)
ORGANISM: ABNORMAL
PDW BLD-RTO: 16.9 FL (ref 11.5–15)
PLATELET # BLD: 173 E9/L (ref 130–450)
PMV BLD AUTO: 11.8 FL (ref 7–12)
POTASSIUM SERPL-SCNC: 3.8 MMOL/L (ref 3.5–5)
PROTHROMBIN TIME: 26.8 SEC (ref 9.3–12.4)
RBC # BLD: 4.34 E12/L (ref 3.8–5.8)
SODIUM BLD-SCNC: 138 MMOL/L (ref 132–146)
TOTAL PROTEIN: 6.7 G/DL (ref 6.4–8.3)
URINE CULTURE, ROUTINE: ABNORMAL
URINE CULTURE, ROUTINE: ABNORMAL
WBC # BLD: 7.4 E9/L (ref 4.5–11.5)

## 2018-07-01 PROCEDURE — 6370000000 HC RX 637 (ALT 250 FOR IP): Performed by: INTERNAL MEDICINE

## 2018-07-01 PROCEDURE — 73552 X-RAY EXAM OF FEMUR 2/>: CPT

## 2018-07-01 PROCEDURE — 80053 COMPREHEN METABOLIC PANEL: CPT

## 2018-07-01 PROCEDURE — 1200000000 HC SEMI PRIVATE

## 2018-07-01 PROCEDURE — 73502 X-RAY EXAM HIP UNI 2-3 VIEWS: CPT

## 2018-07-01 PROCEDURE — 85025 COMPLETE CBC W/AUTO DIFF WBC: CPT

## 2018-07-01 PROCEDURE — 36415 COLL VENOUS BLD VENIPUNCTURE: CPT

## 2018-07-01 PROCEDURE — 2580000003 HC RX 258: Performed by: INTERNAL MEDICINE

## 2018-07-01 PROCEDURE — 85610 PROTHROMBIN TIME: CPT

## 2018-07-01 PROCEDURE — 6360000002 HC RX W HCPCS: Performed by: INTERNAL MEDICINE

## 2018-07-01 RX ORDER — HYDROCODONE BITARTRATE AND ACETAMINOPHEN 5; 325 MG/1; MG/1
1 TABLET ORAL EVERY 6 HOURS PRN
Status: DISCONTINUED | OUTPATIENT
Start: 2018-07-01 | End: 2018-07-02 | Stop reason: HOSPADM

## 2018-07-01 RX ADMIN — SODIUM CHLORIDE: 9 INJECTION, SOLUTION INTRAVENOUS at 03:28

## 2018-07-01 RX ADMIN — DOCUSATE SODIUM 100 MG: 100 CAPSULE, LIQUID FILLED ORAL at 20:40

## 2018-07-01 RX ADMIN — DEXTROSE MONOHYDRATE 1 G: 5 INJECTION INTRAVENOUS at 21:51

## 2018-07-01 RX ADMIN — DEXTROSE MONOHYDRATE 1 G: 5 INJECTION INTRAVENOUS at 00:11

## 2018-07-01 RX ADMIN — TAMSULOSIN HYDROCHLORIDE 0.4 MG: 0.4 CAPSULE ORAL at 09:32

## 2018-07-01 RX ADMIN — FINASTERIDE 5 MG: 5 TABLET, FILM COATED ORAL at 09:32

## 2018-07-01 RX ADMIN — SODIUM CHLORIDE: 9 INJECTION, SOLUTION INTRAVENOUS at 17:47

## 2018-07-01 RX ADMIN — LEVOTHYROXINE SODIUM 100 MCG: 100 TABLET ORAL at 06:08

## 2018-07-01 RX ADMIN — FUROSEMIDE 40 MG: 40 TABLET ORAL at 09:32

## 2018-07-01 RX ADMIN — DOCUSATE SODIUM 100 MG: 100 CAPSULE, LIQUID FILLED ORAL at 09:32

## 2018-07-01 RX ADMIN — POTASSIUM CHLORIDE 10 MEQ: 10 TABLET, EXTENDED RELEASE ORAL at 20:40

## 2018-07-01 RX ADMIN — POTASSIUM CHLORIDE 10 MEQ: 10 TABLET, EXTENDED RELEASE ORAL at 09:32

## 2018-07-01 RX ADMIN — ATENOLOL 25 MG: 25 TABLET ORAL at 20:40

## 2018-07-01 RX ADMIN — HYDROCODONE BITARTRATE AND ACETAMINOPHEN 1 TABLET: 5; 325 TABLET ORAL at 19:49

## 2018-07-01 RX ADMIN — WARFARIN SODIUM 3 MG: 3 TABLET ORAL at 18:34

## 2018-07-01 RX ADMIN — SIMVASTATIN 40 MG: 40 TABLET, FILM COATED ORAL at 20:40

## 2018-07-01 RX ADMIN — MAGNESIUM HYDROXIDE 30 ML: 400 SUSPENSION ORAL at 12:44

## 2018-07-01 RX ADMIN — HYDROCODONE BITARTRATE AND ACETAMINOPHEN 1 TABLET: 5; 325 TABLET ORAL at 12:43

## 2018-07-01 ASSESSMENT — PAIN SCALES - GENERAL
PAINLEVEL_OUTOF10: 0
PAINLEVEL_OUTOF10: 9
PAINLEVEL_OUTOF10: 5
PAINLEVEL_OUTOF10: 0
PAINLEVEL_OUTOF10: 5
PAINLEVEL_OUTOF10: 0

## 2018-07-01 ASSESSMENT — PAIN DESCRIPTION - ORIENTATION
ORIENTATION: LEFT
ORIENTATION: LEFT

## 2018-07-01 ASSESSMENT — PAIN DESCRIPTION - LOCATION
LOCATION: KNEE
LOCATION: FOOT;HIP
LOCATION: HIP;KNEE

## 2018-07-01 ASSESSMENT — PAIN DESCRIPTION - PAIN TYPE
TYPE: ACUTE PAIN

## 2018-07-01 ASSESSMENT — PAIN DESCRIPTION - DESCRIPTORS: DESCRIPTORS: BURNING

## 2018-07-01 NOTE — PROGRESS NOTES
Internal Medicine Progress Note    [facility]. Cait Sanford., & [address] Cait Sanford., F.A.C.O.I. Nakia Almaguer D.O., F.A.C.O.I. Primary Care Physician: Natalee Anthony MD   Admitting Physician:  Ismael Davidson DO  Admission date and time: 6/29/2018  6:19 PM    Room:  [address] [place] [address]  Admitting diagnosis: Hematuria [R31.9]    Patient Name: Ana Cristina Bruce  MRN: [id number]    Date of Service: 7/1/2018     Subjective: Angel Willard is a 80 y. o.  male who was seen and examined today,7/1/2018, at the bedside. Patient is possibly confused. He is more interactive today. Patient's main complaint is that of his feet burning bilaterally. Patient has Spann catheter denies any pain or pressure. Hematuria has resolved. Daughter present during my examination. I reviewed the patient's past medical, surgical history and medication. I reviewed the  course of events since last visit. Review of System:  Constitutional:   Negative for fever and chills. HEENT:   Negative for ear pain, sore throat, rhinorrhea and sinus pressure. Negative for eye pain, discharge and redness. Psch:   Denies depression or anxiety. Cardiovascular:   Denies any chest pain, irregular heartbeats, or palpitations. Respiratory:   Denies shortness of breath, coughing, sputum production, hemoptysis, or wheezing. Gastrointestinal:   Denies nausea, vomiting, diarrhea, or constipation. Denies any abdominal pain. Extremities:   Denies any lower extremity swelling or edema. Neurology:    Denies any headache or focal neurological deficits. Generalized weakness. Burning of the feet bilaterally. Derm:   Denies any rashes, ulcers, or excoriations. Denies bruising. Genitourinary: Spann catheter. No pain or pressure.   Musculoskeletal:  Negative for myalgias, back pain and arthralgias      Allergy:  No Known Allergies     Medication:  Scheduled Meds:   atenolol  25 mg Oral Nightly    docusate sodium  100 mg further plan of care. More than 50% of my  time was spent counseling and/or coordination of care with the patient and/or family with face to face contact. Time was spent reviewing notes and laboratory data, instructing and counseling the patient  regarding my findings and recommendations and reviewing and answer questions. Edmund Saldivar DO, F.A.C.O.I.   On 7/1/2018  5:26 PM

## 2018-07-02 VITALS
SYSTOLIC BLOOD PRESSURE: 133 MMHG | WEIGHT: 171 LBS | RESPIRATION RATE: 18 BRPM | HEART RATE: 94 BPM | OXYGEN SATURATION: 96 % | DIASTOLIC BLOOD PRESSURE: 87 MMHG | HEIGHT: 73 IN | BODY MASS INDEX: 22.66 KG/M2 | TEMPERATURE: 96.5 F

## 2018-07-02 LAB
ALBUMIN SERPL-MCNC: 3.4 G/DL (ref 3.5–5.2)
ALP BLD-CCNC: 113 U/L (ref 40–129)
ALT SERPL-CCNC: 9 U/L (ref 0–40)
ANION GAP SERPL CALCULATED.3IONS-SCNC: 10 MMOL/L (ref 7–16)
ANISOCYTOSIS: ABNORMAL
AST SERPL-CCNC: 13 U/L (ref 0–39)
BASOPHILS ABSOLUTE: 0.07 E9/L (ref 0–0.2)
BASOPHILS RELATIVE PERCENT: 1 % (ref 0–2)
BILIRUB SERPL-MCNC: 0.6 MG/DL (ref 0–1.2)
BUN BLDV-MCNC: 19 MG/DL (ref 8–23)
CALCIUM SERPL-MCNC: 9.2 MG/DL (ref 8.6–10.2)
CHLORIDE BLD-SCNC: 106 MMOL/L (ref 98–107)
CO2: 24 MMOL/L (ref 22–29)
CREAT SERPL-MCNC: 1.3 MG/DL (ref 0.7–1.2)
EOSINOPHILS ABSOLUTE: 0.22 E9/L (ref 0.05–0.5)
EOSINOPHILS RELATIVE PERCENT: 3 % (ref 0–6)
GFR AFRICAN AMERICAN: >60
GFR NON-AFRICAN AMERICAN: 52 ML/MIN/1.73
GLUCOSE BLD-MCNC: 117 MG/DL (ref 74–109)
HCT VFR BLD CALC: 37.6 % (ref 37–54)
HEMOGLOBIN: 12 G/DL (ref 12.5–16.5)
HYPOCHROMIA: ABNORMAL
INR BLD: 2.3
LYMPHOCYTES ABSOLUTE: 1.7 E9/L (ref 1.5–4)
LYMPHOCYTES RELATIVE PERCENT: 22.6 % (ref 20–42)
MCH RBC QN AUTO: 26.7 PG (ref 26–35)
MCHC RBC AUTO-ENTMCNC: 31.9 % (ref 32–34.5)
MCV RBC AUTO: 83.7 FL (ref 80–99.9)
MONOCYTES ABSOLUTE: 0.74 E9/L (ref 0.1–0.95)
MONOCYTES RELATIVE PERCENT: 10.1 % (ref 2–12)
NEUTROPHILS ABSOLUTE: 4.66 E9/L (ref 1.8–7.3)
NEUTROPHILS RELATIVE PERCENT: 63.3 % (ref 43–80)
PDW BLD-RTO: 17 FL (ref 11.5–15)
PLATELET # BLD: 176 E9/L (ref 130–450)
PMV BLD AUTO: 11.8 FL (ref 7–12)
POTASSIUM SERPL-SCNC: 4.5 MMOL/L (ref 3.5–5)
PROTHROMBIN TIME: 26.2 SEC (ref 9.3–12.4)
RBC # BLD: 4.49 E12/L (ref 3.8–5.8)
SODIUM BLD-SCNC: 140 MMOL/L (ref 132–146)
TOTAL PROTEIN: 6.8 G/DL (ref 6.4–8.3)
WBC # BLD: 7.4 E9/L (ref 4.5–11.5)

## 2018-07-02 PROCEDURE — 85025 COMPLETE CBC W/AUTO DIFF WBC: CPT

## 2018-07-02 PROCEDURE — 97535 SELF CARE MNGMENT TRAINING: CPT

## 2018-07-02 PROCEDURE — G8988 SELF CARE GOAL STATUS: HCPCS

## 2018-07-02 PROCEDURE — 6370000000 HC RX 637 (ALT 250 FOR IP): Performed by: INTERNAL MEDICINE

## 2018-07-02 PROCEDURE — 85610 PROTHROMBIN TIME: CPT

## 2018-07-02 PROCEDURE — 2580000003 HC RX 258: Performed by: INTERNAL MEDICINE

## 2018-07-02 PROCEDURE — G8987 SELF CARE CURRENT STATUS: HCPCS

## 2018-07-02 PROCEDURE — 80053 COMPREHEN METABOLIC PANEL: CPT

## 2018-07-02 PROCEDURE — 97166 OT EVAL MOD COMPLEX 45 MIN: CPT

## 2018-07-02 PROCEDURE — 36415 COLL VENOUS BLD VENIPUNCTURE: CPT

## 2018-07-02 RX ORDER — NITROFURANTOIN 25; 75 MG/1; MG/1
100 CAPSULE ORAL 2 TIMES DAILY
Qty: 10 CAPSULE | Refills: 0 | Status: SHIPPED | OUTPATIENT
Start: 2018-07-02 | End: 2018-07-07

## 2018-07-02 RX ORDER — HYDROCODONE BITARTRATE AND ACETAMINOPHEN 5; 325 MG/1; MG/1
1 TABLET ORAL EVERY 6 HOURS PRN
Qty: 5 TABLET | Refills: 0 | Status: SHIPPED | OUTPATIENT
Start: 2018-07-02 | End: 2018-07-09

## 2018-07-02 RX ADMIN — HYDROCODONE BITARTRATE AND ACETAMINOPHEN 1 TABLET: 5; 325 TABLET ORAL at 01:38

## 2018-07-02 RX ADMIN — FUROSEMIDE 40 MG: 40 TABLET ORAL at 08:45

## 2018-07-02 RX ADMIN — SODIUM CHLORIDE: 9 INJECTION, SOLUTION INTRAVENOUS at 08:45

## 2018-07-02 RX ADMIN — HYDROCODONE BITARTRATE AND ACETAMINOPHEN 1 TABLET: 5; 325 TABLET ORAL at 11:15

## 2018-07-02 RX ADMIN — LEVOTHYROXINE SODIUM 100 MCG: 100 TABLET ORAL at 07:25

## 2018-07-02 RX ADMIN — HYDROCODONE BITARTRATE AND ACETAMINOPHEN 1 TABLET: 5; 325 TABLET ORAL at 15:59

## 2018-07-02 RX ADMIN — POTASSIUM CHLORIDE 10 MEQ: 10 TABLET, EXTENDED RELEASE ORAL at 08:45

## 2018-07-02 RX ADMIN — TAMSULOSIN HYDROCHLORIDE 0.4 MG: 0.4 CAPSULE ORAL at 08:45

## 2018-07-02 RX ADMIN — FINASTERIDE 5 MG: 5 TABLET, FILM COATED ORAL at 08:45

## 2018-07-02 RX ADMIN — DOCUSATE SODIUM 100 MG: 100 CAPSULE, LIQUID FILLED ORAL at 08:45

## 2018-07-02 ASSESSMENT — PAIN SCALES - GENERAL
PAINLEVEL_OUTOF10: 0
PAINLEVEL_OUTOF10: 5
PAINLEVEL_OUTOF10: 6
PAINLEVEL_OUTOF10: 4
PAINLEVEL_OUTOF10: 6

## 2018-07-02 ASSESSMENT — PAIN DESCRIPTION - LOCATION
LOCATION: HIP
LOCATION: KNEE

## 2018-07-02 ASSESSMENT — PAIN DESCRIPTION - PAIN TYPE
TYPE: ACUTE PAIN;CHRONIC PAIN
TYPE: CHRONIC PAIN

## 2018-07-02 ASSESSMENT — PAIN DESCRIPTION - ORIENTATION: ORIENTATION: RIGHT;LEFT

## 2018-07-02 NOTE — PROGRESS NOTES
Occupational Therapy  Occupational Therapy Initial Assessment    Date:2018  Patient Name: Shirley Mohamud  MRN: [id number]  : 6/3/1926  ROOM #: [id number]    Placement recommendation: subacute    Equipment prescriptions needed:  TBD   AM-PAC Daily Activity Inpatient   How much help for putting on and taking off regular lower body clothing?: Total  How much help for Bathing?: Total  How much help for Toileting?: Total  How much help for putting on and taking off regular upper body clothing?: Total  How much help for taking care of personal grooming?: Total  How much help for eating meals?: A Lot  AM-PAC Inpatient Daily Activity Raw Score: 7  AM-PAC Inpatient ADL T-Scale Score : 20.13  ADL Inpatient CMS 0-100% Score: 92.44  ADL Inpatient CMS G-Code Modifier : CM    Diagnosis / Problem List:   1. Acute cystitis with hematuria    2. Warfarin anticoagulation    3. Inability to ambulate due to knee       Patient Active Problem List   Diagnosis    Inability to ambulate due to knee    Left knee pain    Atrial fibrillation with RVR (HCC)    RF (renal failure)    Leukocytosis    Essential hypertension    HLD (hyperlipidemia)    History of gout    Hypothyroidism    GERD (gastroesophageal reflux disease)    Hip fracture (HCC)    Coronary atherosclerosis    Occlusion and stenosis of carotid artery with cerebral infarction    Pacemaker    Hematuria    Severe protein-calorie malnutrition (HCC)    Urinary tract infection      Past Medical History:   Past Medical History:   Diagnosis Date    [facility] [address]     Arthritis     CAD (coronary artery disease)     Cancer ([facility] [address]     Depression     Hyperlipidemia     Hypertension     Thyroid disease          The admitting diagnosis and active problem list as listed above have been reviewed prior to the initiation of this evaluation. Nursing cleared the patient for evaluation. Patient is agreeable to evaluation.     Precautions: falls and behavior electronic sitter Pain Scale: numeric  Rate: 9/10 Location: cath site  Type: burning Nurse notified: Yes     Social history: with family ( wife )      Drive: no    Occupation: [employer]  retired      Interest & Hobby: sports   Home architecture: 3 story home  1st floor set up stairglide to 2nd floor no step entry , walk in shower    PLOF: independent with BADL and wife does the homemaking , per pt , mows the lawn    Equipment owned: walker and shower chair    Cognition: alert, oriented x 2 and follows 2 step directions    Confused , sees oil says he will burn up legs are on fire   Communication: confused agitates easily , impatient   Visual perceptual skills: intact     Glasses: yes  Edema: no  Sensation: intact  Hand Dominance:  Left  Right     Left Right Comment   Passive range of motion [facility]     Active range of motion [facility]     Muscle Grade 4/5 4/5    /pinch Strength Good  Good         Function Assessment    Status  Goal  Comment    Feeding:  Independent  Independent     Grooming:  Independent  Independent    UE dressing: Moderate Assist  Supervision    LE dressing:  Maximal Assist  Minimal Assist    Bathing:   Moderate Assist  Supervision    Bed Mobility: Rolled side to side with min assist and much pain in legs     Supervision    Functional Transfers:     refused to sit up in bed   Safety: fair        Functional Mobility:      Patients Goal: none stated    Treatment: pt sat up in bed and peeled a banana , not very patient with himself , able to perform table top activities using both hands \" can you please help me , my legs are on fire , pointing to catheter site   The following skilled interventions were introduced during initial assessment:  bathing and dressing retraining and therapeutic exercise , reorientation  Assessment Summary: Performance Deficiencies include:  Decreased functional mobility:  generalized weakness  Decreased endurance:  fair stability/endurance  Decreased ADL status:  requires assistance bathing  Decreased safety awareness:  requires verbal cues for safe performance  Decreased sensation:  feels burning  on legs and cath site   Decreased cognition: yes confused , thinks there is oil burning in his closet     Rehab Potential: good   Patient and/or family understands diagnosis, prognosis and plan of care: yes   Plan of care: Patient will be seen by OT 1-3 times per week  for therapeutic activity, bed mobility, functional transfers, functional mobility, safety, fall prevention, ADL re-training, balance and endurance activities,instruction and training in energy conservation principles, family/patient education until discharged. Time In: 1500   Time Code treatment minutes: 30    · Medium Complexity  · History: Expanded review of medical records and additional review of physical, cognitive, or psychosocial history related to current functional performance  · Exam: 3-5 performance deficits  · Assistance/Modification: Min/mod assistance or modifications required to perform tasks. May have comorbidities that affect occupational performance.     Electronically signed by Elizabeth Edwards OT on 7/2/2018 at 3:31 PM    Elizabeth Edwards OTR/L#[id number]

## 2018-07-03 ENCOUNTER — HOSPITAL ENCOUNTER (OUTPATIENT)
Age: 83
Discharge: HOME | End: 2018-07-05
Payer: MEDICARE

## 2018-07-03 LAB
ANION GAP SERPL CALCULATED.3IONS-SCNC: 14 MMOL/L (ref 7–16)
BUN BLDV-MCNC: 15 MG/DL (ref 8–23)
CALCIUM SERPL-MCNC: 8.8 MG/DL (ref 8.6–10.2)
CHLORIDE BLD-SCNC: 99 MMOL/L (ref 98–107)
CO2: 23 MMOL/L (ref 22–29)
CREAT SERPL-MCNC: 1.3 MG/DL (ref 0.7–1.2)
GFR AFRICAN AMERICAN: >60
GFR NON-AFRICAN AMERICAN: 52 ML/MIN/1.73
GLUCOSE BLD-MCNC: 125 MG/DL (ref 74–109)
HBA1C MFR BLD: 6.9 % (ref 4–5.6)
HCT VFR BLD CALC: 34.8 % (ref 37–54)
HEMOGLOBIN: 10.8 G/DL (ref 12.5–16.5)
INR BLD: 2.7
MCH RBC QN AUTO: 26 PG (ref 26–35)
MCHC RBC AUTO-ENTMCNC: 31 % (ref 32–34.5)
MCV RBC AUTO: 83.9 FL (ref 80–99.9)
PDW BLD-RTO: 17.2 FL (ref 11.5–15)
PLATELET # BLD: 166 E9/L (ref 130–450)
PMV BLD AUTO: 12.8 FL (ref 7–12)
POTASSIUM SERPL-SCNC: 3.4 MMOL/L (ref 3.5–5)
PROTHROMBIN TIME: 29.6 SEC (ref 9.3–12.4)
RBC # BLD: 4.15 E12/L (ref 3.8–5.8)
SODIUM BLD-SCNC: 136 MMOL/L (ref 132–146)
WBC # BLD: 9.1 E9/L (ref 4.5–11.5)

## 2018-07-03 PROCEDURE — 80048 BASIC METABOLIC PNL TOTAL CA: CPT

## 2018-07-03 PROCEDURE — 85027 COMPLETE CBC AUTOMATED: CPT

## 2018-07-03 PROCEDURE — 36415 COLL VENOUS BLD VENIPUNCTURE: CPT

## 2018-07-03 PROCEDURE — 85610 PROTHROMBIN TIME: CPT

## 2018-07-03 PROCEDURE — 83036 HEMOGLOBIN GLYCOSYLATED A1C: CPT

## 2018-07-05 ENCOUNTER — CLINICAL DOCUMENTATION (OUTPATIENT)
Dept: FAMILY MEDICINE CLINIC | Age: 83
End: 2018-07-05

## 2018-07-05 ENCOUNTER — HOSPITAL ENCOUNTER (OUTPATIENT)
Age: 83
Discharge: HOME | End: 2018-07-07
Payer: MEDICARE

## 2018-07-05 DIAGNOSIS — G89.29 CHRONIC PAIN OF LEFT KNEE: ICD-10-CM

## 2018-07-05 DIAGNOSIS — I10 ESSENTIAL HYPERTENSION: ICD-10-CM

## 2018-07-05 DIAGNOSIS — I48.91 ATRIAL FIBRILLATION WITH RVR (HCC): ICD-10-CM

## 2018-07-05 DIAGNOSIS — M25.562 CHRONIC PAIN OF LEFT KNEE: ICD-10-CM

## 2018-07-05 DIAGNOSIS — S72.002A HIP FRACTURE REQUIRING OPERATIVE REPAIR, LEFT, CLOSED, INITIAL ENCOUNTER (HCC): Primary | ICD-10-CM

## 2018-07-05 LAB
INR BLD: 2.3
PROTHROMBIN TIME: 25.7 SEC (ref 9.3–12.4)

## 2018-07-05 PROCEDURE — 36415 COLL VENOUS BLD VENIPUNCTURE: CPT

## 2018-07-05 PROCEDURE — 85610 PROTHROMBIN TIME: CPT

## 2018-07-05 ASSESSMENT — ENCOUNTER SYMPTOMS
STRIDOR: 0
PHOTOPHOBIA: 0
BLURRED VISION: 0
CONSTIPATION: 0
EYES NEGATIVE: 1
NAUSEA: 0
RESPIRATORY NEGATIVE: 1
COUGH: 0
EYE DISCHARGE: 0
BLOOD IN STOOL: 0
SORE THROAT: 0
EYE PAIN: 0
EYE REDNESS: 0
ABDOMINAL PAIN: 0
DOUBLE VISION: 0
VOMITING: 0
SHORTNESS OF BREATH: 0
HEARTBURN: 0
DIARRHEA: 0
ORTHOPNEA: 0
WHEEZING: 0
GASTROINTESTINAL NEGATIVE: 1
SINUS PAIN: 0
HEMOPTYSIS: 0
SPUTUM PRODUCTION: 0
BACK PAIN: 1

## 2018-07-05 NOTE — PROGRESS NOTES
SUBJECTIVE  Valeriy Gould is a 80 y.o. male. HPI/Chief C/O:  No chief complaint on file. No Known Allergies  I am in to admit this new patient\  I will do a complete med list review and reconcile his hospital care plan with our treatment goals        ROS:  Review of Systems   Constitutional: Positive for malaise/fatigue. Negative for chills, diaphoresis, fever and weight loss. HENT: Negative. Negative for congestion, ear discharge, ear pain, hearing loss, nosebleeds, sinus pain, sore throat and tinnitus. Eyes: Negative. Negative for blurred vision, double vision, photophobia, pain, discharge and redness. Respiratory: Negative. Negative for cough, hemoptysis, sputum production, shortness of breath, wheezing and stridor. Cardiovascular: Negative. Negative for chest pain, palpitations, orthopnea, claudication, leg swelling and PND. Gastrointestinal: Negative. Negative for abdominal pain, blood in stool, constipation, diarrhea, heartburn, melena, nausea and vomiting. Genitourinary: Negative for dysuria, flank pain, frequency, hematuria and urgency. Indwelling Spann    Musculoskeletal: Positive for back pain, falls, joint pain, myalgias and neck pain. Skin: Negative. Negative for itching and rash. Neurological: Positive for weakness. Negative for dizziness, tingling, tremors, sensory change, speech change, focal weakness, seizures, loss of consciousness and headaches. Endo/Heme/Allergies: Negative. Negative for environmental allergies and polydipsia. Does not bruise/bleed easily. Psychiatric/Behavioral: Positive for depression. Negative for hallucinations, memory loss, substance abuse and suicidal ideas. The patient is nervous/anxious. The patient does not have insomnia.       Past Medical/Surgical Hx;  Reviewed with patient      Diagnosis Date    [facility]fib ([facility] [address]     Arthritis     CAD (coronary artery disease)     [facility]     Depression     Hyperlipidemia     Hypertension hernia. Genitourinary:   Genitourinary Comments: Spann CATH is draining well   Musculoskeletal: Normal range of motion. He exhibits tenderness. He exhibits no edema or deformity. Bilateral severe knee pain  Left hip replacement   Diffuse and multiple joint pain of osteoarthritis    Lymphadenopathy:     He has no cervical adenopathy. Neurological: He is alert and oriented to person, place, and time. He has normal reflexes. He displays normal reflexes. A sensory deficit is present. No cranial nerve deficit. He exhibits normal muscle tone. Coordination normal.   Skin: Skin is warm. No rash noted. He is not diaphoretic. No erythema. No pallor. Nursing note and vitals reviewed. ASSESSMENT/PLAN  Diagnoses and all orders for this visit:    Hip fracture ([facility] [address]    Atrial fibrillation with RVR ([facility])  - VASCULAR PANEL  A) asa, plavix, aggrenox  B) COUMADIN, pletal, tzd, STATIN  C) ace, LASIX, folic, ccb  D) cannikinumab, fish oils       Chronic pain of left knee    Essential hypertension  --CARDIAC--COUMADIN, BETA, STATIN, ace, LASIX, ( ccb )        Outpatient Encounter Prescriptions as of 7/5/2018   Medication Sig Dispense Refill    HYDROcodone-acetaminophen (NORCO) 5-325 MG per tablet Take 1 tablet by mouth every 6 hours as needed for Pain for up to 7 days. Sue Garces 5 tablet 0    nitrofurantoin, macrocrystal-monohydrate, (MACROBID) 100 MG capsule Take 1 capsule by mouth 2 times daily for 5 days 10 capsule 0    glipiZIDE (GLUCOTROL XL) 5 MG extended release tablet Take 5 mg by mouth daily      warfarin (COUMADIN) 3 MG tablet Take 3 mg by mouth daily      Mirabegron ER (MYRBETRIQ) 25 MG TB24 Take 1 tablet by mouth daily 30 tablet 0    nystatin (MYCOSTATIN) 593064 UNIT/GM cream Apply topically 2 times daily.  15 g 0    docusate sodium (COLACE) 100 MG capsule Take 100 mg by mouth 2 times daily      potassium chloride (MICRO-K) 10 MEQ extended release capsule Take 20 mEq by mouth daily       atenolol (TENORMIN) 50 MG

## 2018-07-09 ENCOUNTER — HOSPITAL ENCOUNTER (OUTPATIENT)
Age: 83
Discharge: HOME | End: 2018-07-11
Payer: MEDICARE

## 2018-07-09 LAB
INR BLD: 3.3
PROTHROMBIN TIME: 37 SEC (ref 9.3–12.4)

## 2018-07-09 PROCEDURE — 36415 COLL VENOUS BLD VENIPUNCTURE: CPT

## 2018-07-09 PROCEDURE — 85610 PROTHROMBIN TIME: CPT

## 2018-07-10 ENCOUNTER — HOSPITAL ENCOUNTER (OUTPATIENT)
Age: 83
Discharge: HOME | End: 2018-07-12
Payer: MEDICARE

## 2018-07-10 LAB
INR BLD: 2.6
PROTHROMBIN TIME: 29.4 SEC (ref 9.3–12.4)

## 2018-07-10 PROCEDURE — 85610 PROTHROMBIN TIME: CPT

## 2018-07-10 PROCEDURE — 36415 COLL VENOUS BLD VENIPUNCTURE: CPT

## 2018-07-11 ENCOUNTER — HOSPITAL ENCOUNTER (OUTPATIENT)
Age: 83
Discharge: HOME | End: 2018-07-13
Payer: MEDICARE

## 2018-07-11 LAB
INR BLD: 2.4
PROTHROMBIN TIME: 27.4 SEC (ref 9.3–12.4)

## 2018-07-11 PROCEDURE — 36415 COLL VENOUS BLD VENIPUNCTURE: CPT

## 2018-07-11 PROCEDURE — 85610 PROTHROMBIN TIME: CPT

## 2018-07-13 ENCOUNTER — HOSPITAL ENCOUNTER (OUTPATIENT)
Age: 83
Discharge: HOME | End: 2018-07-15
Payer: MEDICARE

## 2018-07-13 LAB
INR BLD: 2.1
PROTHROMBIN TIME: 24.5 SEC (ref 9.3–12.4)

## 2018-07-13 PROCEDURE — 85610 PROTHROMBIN TIME: CPT

## 2018-07-13 PROCEDURE — 36415 COLL VENOUS BLD VENIPUNCTURE: CPT

## 2018-07-16 ENCOUNTER — HOSPITAL ENCOUNTER (OUTPATIENT)
Age: 83
Discharge: HOME | End: 2018-07-18
Payer: MEDICARE

## 2018-07-16 LAB
INR BLD: 1.8
PROTHROMBIN TIME: 21 SEC (ref 9.3–12.4)

## 2018-07-16 PROCEDURE — 36415 COLL VENOUS BLD VENIPUNCTURE: CPT

## 2018-07-16 PROCEDURE — 85610 PROTHROMBIN TIME: CPT

## 2018-07-17 ENCOUNTER — HOSPITAL ENCOUNTER (OUTPATIENT)
Age: 83
Discharge: HOME | End: 2018-07-19
Payer: MEDICARE

## 2018-07-17 LAB — VALPROIC ACID LEVEL: 20 MCG/ML (ref 50–100)

## 2018-07-17 PROCEDURE — 36415 COLL VENOUS BLD VENIPUNCTURE: CPT

## 2018-07-17 PROCEDURE — 80164 ASSAY DIPROPYLACETIC ACD TOT: CPT

## 2018-07-20 ENCOUNTER — HOSPITAL ENCOUNTER (OUTPATIENT)
Age: 83
Discharge: HOME | End: 2018-07-22
Payer: MEDICARE

## 2018-07-20 LAB
INR BLD: 2.4
PROTHROMBIN TIME: 26.8 SEC (ref 9.3–12.4)

## 2018-07-20 PROCEDURE — 36415 COLL VENOUS BLD VENIPUNCTURE: CPT

## 2018-07-20 PROCEDURE — 85610 PROTHROMBIN TIME: CPT

## 2018-07-27 ENCOUNTER — HOSPITAL ENCOUNTER (OUTPATIENT)
Age: 83
Discharge: HOME | End: 2018-07-29
Payer: MEDICARE

## 2018-07-27 LAB
INR BLD: 1.5
PROTHROMBIN TIME: 17.3 SEC (ref 9.3–12.4)

## 2018-07-27 PROCEDURE — 36415 COLL VENOUS BLD VENIPUNCTURE: CPT

## 2018-07-27 PROCEDURE — 85610 PROTHROMBIN TIME: CPT

## 2018-07-31 PROBLEM — N39.0 URINARY TRACT INFECTION: Status: RESOLVED | Noted: 2018-07-01 | Resolved: 2018-07-31

## 2018-08-04 ENCOUNTER — APPOINTMENT (OUTPATIENT)
Dept: GENERAL RADIOLOGY | Age: 83
DRG: 698 | End: 2018-08-04
Payer: MEDICARE

## 2018-08-04 ENCOUNTER — HOSPITAL ENCOUNTER (INPATIENT)
Age: 83
LOS: 5 days | Discharge: OTHER INSTITUTION | DRG: 698 | End: 2018-08-09
Attending: EMERGENCY MEDICINE | Admitting: INTERNAL MEDICINE
Payer: MEDICARE

## 2018-08-04 ENCOUNTER — APPOINTMENT (OUTPATIENT)
Dept: ULTRASOUND IMAGING | Age: 83
DRG: 698 | End: 2018-08-04
Payer: MEDICARE

## 2018-08-04 DIAGNOSIS — I48.91 ATRIAL FIBRILLATION WITH RVR (HCC): ICD-10-CM

## 2018-08-04 DIAGNOSIS — N39.0 URINARY TRACT INFECTION ASSOCIATED WITH INDWELLING URETHRAL CATHETER, INITIAL ENCOUNTER (HCC): ICD-10-CM

## 2018-08-04 DIAGNOSIS — T83.511A URINARY TRACT INFECTION ASSOCIATED WITH INDWELLING URETHRAL CATHETER, INITIAL ENCOUNTER (HCC): ICD-10-CM

## 2018-08-04 DIAGNOSIS — A41.9 SEPSIS, DUE TO UNSPECIFIED ORGANISM: Primary | ICD-10-CM

## 2018-08-04 DIAGNOSIS — N18.9 CHRONIC KIDNEY DISEASE, UNSPECIFIED CKD STAGE: ICD-10-CM

## 2018-08-04 LAB
ALBUMIN SERPL-MCNC: 3.3 G/DL (ref 3.5–5.2)
ALP BLD-CCNC: 99 U/L (ref 40–129)
ALT SERPL-CCNC: 5 U/L (ref 0–40)
ANION GAP SERPL CALCULATED.3IONS-SCNC: 15 MMOL/L (ref 7–16)
ANISOCYTOSIS: ABNORMAL
AST SERPL-CCNC: 13 U/L (ref 0–39)
BACTERIA: ABNORMAL /HPF
BASOPHILS ABSOLUTE: 0 E9/L (ref 0–0.2)
BASOPHILS RELATIVE PERCENT: 0.5 % (ref 0–2)
BILIRUB SERPL-MCNC: 0.6 MG/DL (ref 0–1.2)
BILIRUBIN URINE: ABNORMAL
BLOOD, URINE: ABNORMAL
BUN BLDV-MCNC: 24 MG/DL (ref 8–23)
CALCIUM SERPL-MCNC: 8.6 MG/DL (ref 8.6–10.2)
CHLORIDE BLD-SCNC: 104 MMOL/L (ref 98–107)
CLARITY: ABNORMAL
CO2: 22 MMOL/L (ref 22–29)
COLOR: ABNORMAL
CREAT SERPL-MCNC: 1.3 MG/DL (ref 0.7–1.2)
EKG ATRIAL RATE: 182 BPM
EKG Q-T INTERVAL: 244 MS
EKG QRS DURATION: 68 MS
EKG QTC CALCULATION (BAZETT): 417 MS
EKG R AXIS: 64 DEGREES
EKG T AXIS: 76 DEGREES
EKG VENTRICULAR RATE: 176 BPM
EOSINOPHILS ABSOLUTE: 0 E9/L (ref 0.05–0.5)
EOSINOPHILS RELATIVE PERCENT: 0.5 % (ref 0–6)
GFR AFRICAN AMERICAN: >60
GFR NON-AFRICAN AMERICAN: 52 ML/MIN/1.73
GLUCOSE BLD-MCNC: 115 MG/DL (ref 74–109)
GLUCOSE URINE: NEGATIVE MG/DL
HCT VFR BLD CALC: 30.9 % (ref 37–54)
HCT VFR BLD CALC: 31.2 % (ref 37–54)
HCT VFR BLD CALC: 38.8 % (ref 37–54)
HEMOGLOBIN: 10.2 G/DL (ref 12.5–16.5)
HEMOGLOBIN: 12.7 G/DL (ref 12.5–16.5)
HEMOGLOBIN: 9.7 G/DL (ref 12.5–16.5)
HYPOCHROMIA: ABNORMAL
INR BLD: 2.2
INR BLD: 2.6
KETONES, URINE: ABNORMAL MG/DL
LACTIC ACID, SEPSIS: 5.6 MMOL/L (ref 0.5–1.9)
LACTIC ACID, SEPSIS: 6 MMOL/L (ref 0.5–1.9)
LACTIC ACID: 5.6 MMOL/L (ref 0.5–2.2)
LEUKOCYTE ESTERASE, URINE: ABNORMAL
LYMPHOCYTES ABSOLUTE: 0.96 E9/L (ref 1.5–4)
LYMPHOCYTES RELATIVE PERCENT: 15.7 % (ref 20–42)
MCH RBC QN AUTO: 27.9 PG (ref 26–35)
MCHC RBC AUTO-ENTMCNC: 32.7 % (ref 32–34.5)
MCV RBC AUTO: 85.3 FL (ref 80–99.9)
MONOCYTES ABSOLUTE: 0 E9/L (ref 0.1–0.95)
MONOCYTES RELATIVE PERCENT: 0.8 % (ref 2–12)
NEUTROPHILS ABSOLUTE: 5.04 E9/L (ref 1.8–7.3)
NEUTROPHILS RELATIVE PERCENT: 84.3 % (ref 43–80)
NITRITE, URINE: POSITIVE
OVALOCYTES: ABNORMAL
PDW BLD-RTO: 18 FL (ref 11.5–15)
PH UA: 7 (ref 5–9)
PLATELET # BLD: 144 E9/L (ref 130–450)
PMV BLD AUTO: 10.9 FL (ref 7–12)
POIKILOCYTES: ABNORMAL
POLYCHROMASIA: ABNORMAL
POTASSIUM SERPL-SCNC: 4.2 MMOL/L (ref 3.5–5)
PROTEIN UA: 100 MG/DL
PROTHROMBIN TIME: 25 SEC (ref 9.3–12.4)
PROTHROMBIN TIME: 30 SEC (ref 9.3–12.4)
RBC # BLD: 4.55 E12/L (ref 3.8–5.8)
RBC UA: ABNORMAL /HPF (ref 0–2)
SCHISTOCYTES: ABNORMAL
SODIUM BLD-SCNC: 141 MMOL/L (ref 132–146)
SPECIFIC GRAVITY UA: 1.01 (ref 1–1.03)
TOTAL PROTEIN: 6.4 G/DL (ref 6.4–8.3)
UROBILINOGEN, URINE: 1 E.U./DL
WBC # BLD: 6 E9/L (ref 4.5–11.5)
WBC UA: >20 /HPF (ref 0–5)

## 2018-08-04 PROCEDURE — 6360000002 HC RX W HCPCS: Performed by: EMERGENCY MEDICINE

## 2018-08-04 PROCEDURE — 81001 URINALYSIS AUTO W/SCOPE: CPT

## 2018-08-04 PROCEDURE — 85025 COMPLETE CBC W/AUTO DIFF WBC: CPT

## 2018-08-04 PROCEDURE — 2140000000 HC CCU INTERMEDIATE R&B

## 2018-08-04 PROCEDURE — 96365 THER/PROPH/DIAG IV INF INIT: CPT

## 2018-08-04 PROCEDURE — 2580000003 HC RX 258: Performed by: INTERNAL MEDICINE

## 2018-08-04 PROCEDURE — 6370000000 HC RX 637 (ALT 250 FOR IP): Performed by: EMERGENCY MEDICINE

## 2018-08-04 PROCEDURE — 94761 N-INVAS EAR/PLS OXIMETRY MLT: CPT

## 2018-08-04 PROCEDURE — 76604 US EXAM CHEST: CPT

## 2018-08-04 PROCEDURE — 51702 INSERT TEMP BLADDER CATH: CPT

## 2018-08-04 PROCEDURE — 87040 BLOOD CULTURE FOR BACTERIA: CPT

## 2018-08-04 PROCEDURE — 99285 EMERGENCY DEPT VISIT HI MDM: CPT

## 2018-08-04 PROCEDURE — 2580000003 HC RX 258: Performed by: EMERGENCY MEDICINE

## 2018-08-04 PROCEDURE — 83735 ASSAY OF MAGNESIUM: CPT

## 2018-08-04 PROCEDURE — 71045 X-RAY EXAM CHEST 1 VIEW: CPT

## 2018-08-04 PROCEDURE — 73501 X-RAY EXAM HIP UNI 1 VIEW: CPT

## 2018-08-04 PROCEDURE — 6370000000 HC RX 637 (ALT 250 FOR IP): Performed by: INTERNAL MEDICINE

## 2018-08-04 PROCEDURE — 99222 1ST HOSP IP/OBS MODERATE 55: CPT | Performed by: INTERNAL MEDICINE

## 2018-08-04 PROCEDURE — 87186 SC STD MICRODIL/AGAR DIL: CPT

## 2018-08-04 PROCEDURE — 85018 HEMOGLOBIN: CPT

## 2018-08-04 PROCEDURE — 87077 CULTURE AEROBIC IDENTIFY: CPT

## 2018-08-04 PROCEDURE — 96375 TX/PRO/DX INJ NEW DRUG ADDON: CPT

## 2018-08-04 PROCEDURE — 85014 HEMATOCRIT: CPT

## 2018-08-04 PROCEDURE — 36415 COLL VENOUS BLD VENIPUNCTURE: CPT

## 2018-08-04 PROCEDURE — 83605 ASSAY OF LACTIC ACID: CPT

## 2018-08-04 PROCEDURE — 85610 PROTHROMBIN TIME: CPT

## 2018-08-04 PROCEDURE — 80053 COMPREHEN METABOLIC PANEL: CPT

## 2018-08-04 PROCEDURE — 87088 URINE BACTERIA CULTURE: CPT

## 2018-08-04 PROCEDURE — 93005 ELECTROCARDIOGRAM TRACING: CPT | Performed by: EMERGENCY MEDICINE

## 2018-08-04 PROCEDURE — 2500000003 HC RX 250 WO HCPCS: Performed by: EMERGENCY MEDICINE

## 2018-08-04 RX ORDER — ACETAMINOPHEN 325 MG/1
650 TABLET ORAL EVERY 4 HOURS PRN
Status: DISCONTINUED | OUTPATIENT
Start: 2018-08-04 | End: 2018-08-09 | Stop reason: HOSPADM

## 2018-08-04 RX ORDER — LANOLIN ALCOHOL/MO/W.PET/CERES
6 CREAM (GRAM) TOPICAL NIGHTLY
Status: DISCONTINUED | OUTPATIENT
Start: 2018-08-04 | End: 2018-08-09 | Stop reason: HOSPADM

## 2018-08-04 RX ORDER — WARFARIN SODIUM 5 MG/1
5 TABLET ORAL WEEKLY
Status: DISCONTINUED | OUTPATIENT
Start: 2018-08-04 | End: 2018-08-04

## 2018-08-04 RX ORDER — 0.9 % SODIUM CHLORIDE 0.9 %
1000 INTRAVENOUS SOLUTION INTRAVENOUS ONCE
Status: DISCONTINUED | OUTPATIENT
Start: 2018-08-04 | End: 2018-08-04

## 2018-08-04 RX ORDER — 0.9 % SODIUM CHLORIDE 0.9 %
1000 INTRAVENOUS SOLUTION INTRAVENOUS ONCE
Status: COMPLETED | OUTPATIENT
Start: 2018-08-04 | End: 2018-08-05

## 2018-08-04 RX ORDER — WARFARIN SODIUM 5 MG/1
5 TABLET ORAL
Status: DISCONTINUED | OUTPATIENT
Start: 2018-08-04 | End: 2018-08-04

## 2018-08-04 RX ORDER — ACETAMINOPHEN 325 MG/1
650 TABLET ORAL ONCE
Status: DISCONTINUED | OUTPATIENT
Start: 2018-08-04 | End: 2018-08-04

## 2018-08-04 RX ORDER — ACETAMINOPHEN 650 MG/1
650 SUPPOSITORY RECTAL ONCE
Status: COMPLETED | OUTPATIENT
Start: 2018-08-04 | End: 2018-08-04

## 2018-08-04 RX ORDER — ATENOLOL 25 MG/1
25 TABLET ORAL NIGHTLY
Status: DISCONTINUED | OUTPATIENT
Start: 2018-08-04 | End: 2018-08-05

## 2018-08-04 RX ORDER — LEVOTHYROXINE SODIUM 0.1 MG/1
100 TABLET ORAL DAILY
Status: DISCONTINUED | OUTPATIENT
Start: 2018-08-05 | End: 2018-08-09 | Stop reason: HOSPADM

## 2018-08-04 RX ORDER — WARFARIN SODIUM 4 MG/1
4 TABLET ORAL
Status: DISCONTINUED | OUTPATIENT
Start: 2018-08-06 | End: 2018-08-04

## 2018-08-04 RX ORDER — SIMVASTATIN 40 MG
40 TABLET ORAL NIGHTLY
Status: DISCONTINUED | OUTPATIENT
Start: 2018-08-04 | End: 2018-08-09 | Stop reason: HOSPADM

## 2018-08-04 RX ORDER — SODIUM CHLORIDE 0.9 % (FLUSH) 0.9 %
10 SYRINGE (ML) INJECTION PRN
Status: DISCONTINUED | OUTPATIENT
Start: 2018-08-04 | End: 2018-08-09 | Stop reason: HOSPADM

## 2018-08-04 RX ORDER — TAMSULOSIN HYDROCHLORIDE 0.4 MG/1
0.4 CAPSULE ORAL DAILY
Status: DISCONTINUED | OUTPATIENT
Start: 2018-08-05 | End: 2018-08-05

## 2018-08-04 RX ORDER — 0.9 % SODIUM CHLORIDE 0.9 %
500 INTRAVENOUS SOLUTION INTRAVENOUS ONCE
Status: COMPLETED | OUTPATIENT
Start: 2018-08-04 | End: 2018-08-04

## 2018-08-04 RX ORDER — WARFARIN SODIUM 5 MG/1
5 TABLET ORAL
COMMUNITY

## 2018-08-04 RX ORDER — WARFARIN SODIUM 4 MG/1
4 TABLET ORAL DAILY
Status: DISCONTINUED | OUTPATIENT
Start: 2018-08-04 | End: 2018-08-04

## 2018-08-04 RX ORDER — FINASTERIDE 5 MG/1
5 TABLET, FILM COATED ORAL DAILY
Status: DISCONTINUED | OUTPATIENT
Start: 2018-08-05 | End: 2018-08-05

## 2018-08-04 RX ORDER — GLIPIZIDE 5 MG/1
5 TABLET ORAL
Status: DISCONTINUED | OUTPATIENT
Start: 2018-08-05 | End: 2018-08-04

## 2018-08-04 RX ORDER — CHOLECALCIFEROL (VITAMIN D3) 125 MCG
5 CAPSULE ORAL NIGHTLY
COMMUNITY

## 2018-08-04 RX ORDER — SODIUM CHLORIDE 9 MG/ML
INJECTION, SOLUTION INTRAVENOUS CONTINUOUS
Status: DISCONTINUED | OUTPATIENT
Start: 2018-08-04 | End: 2018-08-05

## 2018-08-04 RX ORDER — ONDANSETRON 2 MG/ML
4 INJECTION INTRAMUSCULAR; INTRAVENOUS EVERY 6 HOURS PRN
Status: DISCONTINUED | OUTPATIENT
Start: 2018-08-04 | End: 2018-08-09 | Stop reason: HOSPADM

## 2018-08-04 RX ORDER — DIVALPROEX SODIUM 125 MG/1
125 TABLET, DELAYED RELEASE ORAL 3 TIMES DAILY
COMMUNITY

## 2018-08-04 RX ORDER — SODIUM CHLORIDE 0.9 % (FLUSH) 0.9 %
10 SYRINGE (ML) INJECTION EVERY 12 HOURS SCHEDULED
Status: DISCONTINUED | OUTPATIENT
Start: 2018-08-04 | End: 2018-08-09 | Stop reason: HOSPADM

## 2018-08-04 RX ORDER — ACETAMINOPHEN 325 MG/1
650 TABLET ORAL EVERY 4 HOURS PRN
COMMUNITY

## 2018-08-04 RX ORDER — 0.9 % SODIUM CHLORIDE 0.9 %
2000 INTRAVENOUS SOLUTION INTRAVENOUS ONCE
Status: COMPLETED | OUTPATIENT
Start: 2018-08-04 | End: 2018-08-04

## 2018-08-04 RX ORDER — FUROSEMIDE 40 MG/1
40 TABLET ORAL DAILY
Status: DISCONTINUED | OUTPATIENT
Start: 2018-08-05 | End: 2018-08-05

## 2018-08-04 RX ADMIN — SODIUM CHLORIDE 2000 ML: 9 INJECTION, SOLUTION INTRAVENOUS at 10:51

## 2018-08-04 RX ADMIN — SODIUM CHLORIDE: 9 INJECTION, SOLUTION INTRAVENOUS at 19:26

## 2018-08-04 RX ADMIN — CEFEPIME HYDROCHLORIDE 2 G: 2 INJECTION, POWDER, FOR SOLUTION INTRAVENOUS at 12:02

## 2018-08-04 RX ADMIN — SODIUM CHLORIDE 1000 ML: 9 INJECTION, SOLUTION INTRAVENOUS at 21:47

## 2018-08-04 RX ADMIN — SIMVASTATIN 40 MG: 40 TABLET, FILM COATED ORAL at 23:03

## 2018-08-04 RX ADMIN — Medication 10 ML: at 10:45

## 2018-08-04 RX ADMIN — ACETAMINOPHEN 650 MG: 650 SUPPOSITORY RECTAL at 11:05

## 2018-08-04 RX ADMIN — DILTIAZEM HYDROCHLORIDE 25 MG: 5 INJECTION INTRAVENOUS at 12:51

## 2018-08-04 RX ADMIN — MELATONIN 3 MG ORAL TABLET 6 MG: 3 TABLET ORAL at 21:54

## 2018-08-04 RX ADMIN — VITAMIN D, TAB 1000IU (100/BT) 1000 UNITS: 25 TAB at 20:32

## 2018-08-04 RX ADMIN — SODIUM CHLORIDE 500 ML: 9 INJECTION, SOLUTION INTRAVENOUS at 12:04

## 2018-08-04 ASSESSMENT — ENCOUNTER SYMPTOMS
COUGH: 0
ABDOMINAL PAIN: 0
NAUSEA: 0
HEARTBURN: 1
DIARRHEA: 0
BLOOD IN STOOL: 0
CONSTIPATION: 0
HEMOPTYSIS: 0
VOMITING: 1

## 2018-08-04 ASSESSMENT — PAIN SCALES - PAIN ASSESSMENT IN ADVANCED DEMENTIA (PAINAD)
TOTALSCORE: 1
NEGVOCALIZATION: 0
CONSOLABILITY: 1
BODYLANGUAGE: 0
BREATHING: 0
FACIALEXPRESSION: 0

## 2018-08-04 ASSESSMENT — PAIN SCALES - GENERAL
PAINLEVEL_OUTOF10: 0

## 2018-08-04 ASSESSMENT — PAIN DESCRIPTION - LOCATION: LOCATION: LEG

## 2018-08-04 ASSESSMENT — PAIN DESCRIPTION - ORIENTATION: ORIENTATION: LEFT

## 2018-08-04 ASSESSMENT — PAIN DESCRIPTION - PAIN TYPE: TYPE: ACUTE PAIN

## 2018-08-04 NOTE — ED PROVIDER NOTES
appearance. Findings compatible with atherosclerotic disease of the aorta. XR HIP LEFT (1 VIEW)   Final Result          EKG: This EKG is signed and interpreted by the EP. Time: 1043  Rate: 176  Rhythm: Atrial fibrillation  Interpretation: Atrial fibrillation with RVR. No obvious ST elevation or depression. Normal QTc interval.  Comparison: changes compared to previous EKG (RVR)    ------------------------- NURSING NOTES AND VITALS REVIEWED ---------------------------   The nursing notes within the ED encounter and vital signs as below have been reviewed by myself. /74   Pulse 110   Temp 99.8 °F (37.7 °C) (Oral)   Resp 24   Ht 6' 1\" (1.854 m)   Wt 170 lb (77.1 kg)   SpO2 95%   BMI 22.43 kg/m²   Oxygen Saturation Interpretation: Normal    The patients available past medical records and past encounters were reviewed. ------------------------------ ED COURSE/MEDICAL DECISION MAKING----------------------  Medications   sodium chloride flush 0.9 % injection 10 mL (10 mLs Intravenous Given 8/4/18 1045)   sodium chloride flush 0.9 % injection 10 mL (not administered)   diltiazem (CARDIZEM) 25 mg in dextrose 5% 30 mL IVPB (ER Load) (0 mg Intravenous Stopped 8/4/18 1302)   0.9 % sodium chloride IV bolus 2,000 mL (0 mLs Intravenous Stopped 8/4/18 1201)   acetaminophen (TYLENOL) suppository 650 mg (650 mg Rectal Given 8/4/18 1105)   cefepime (MAXIPIME) 2 g IVPB minibag (0 g Intravenous Stopped 8/4/18 1237)   0.9 % sodium chloride bolus (0 mLs Intravenous Stopped 8/4/18 1234)       Medical Decision Making:    Pt is a 80year old male presenting for altered mental status and hematuria from [facility]. Pt's baseline is A&O to self only. Per EMS, staff at [facility] reports that pt has been more altered with an increase in left hip and leg pain. There are no reports of a fall. Staff at [facility] changed pt's catheter this morning and noticed blood in the urine and around the penis.  EMS noted that

## 2018-08-04 NOTE — H&P
Cholecalciferol (VITAMIN D3) 1000 units CAPS Take 1 capsule by mouth daily   Yes Historical Provider, MD   linagliptin (TRADJENTA) 5 MG tablet Take 5 mg by mouth nightly   Yes Historical Provider, MD   finasteride (PROSCAR) 5 MG tablet Take 5 mg by mouth daily. Yes Historical Provider, MD   levothyroxine (SYNTHROID) 100 MCG tablet Take 100 mcg by mouth Daily. Yes Historical Provider, MD   simvastatin (ZOCOR) 40 MG tablet Take 40 mg by mouth nightly. Yes Historical Provider, MD   tamsulosin (FLOMAX) 0.4 MG capsule Take 0.4 mg by mouth daily. Yes Historical Provider, MD       Allergies:  Patient has no known allergies. Social History:   TOBACCO:   reports that he has quit smoking. He has never used smokeless tobacco.  ETOH:   reports that he does not drink alcohol. Family History:   Family History   Problem Relation Age of Onset   Cam Thad Cancer Sister         lung    Cancer Brother        REVIEW OF SYSTEMS:    Limited because of pt's condition, per nursing home staff, ROS below:    Review of Systems   Constitutional: Positive for fever. Negative for chills and malaise/fatigue. Respiratory: Negative for cough and hemoptysis. Cardiovascular: Negative for chest pain, palpitations and leg swelling. Gastrointestinal: Positive for heartburn and vomiting. Negative for abdominal pain, blood in stool, constipation, diarrhea and nausea. Genitourinary: Positive for hematuria. Musculoskeletal: Positive for joint pain. Neurological: Negative for dizziness, seizures, loss of consciousness and headaches. Psychiatric/Behavioral: The patient is nervous/anxious. Physical Exam   · Vitals: BP 84/64   Pulse 131   Temp 97.8 °F (36.6 °C) (Temporal)   Resp 28   Ht 6' 1\" (1.854 m)   Wt 164 lb 3.2 oz (74.5 kg)   SpO2 94%   BMI 21.66 kg/m²   Physical Exam   Constitutional:   Drowsy but rousable   HENT:   Head: Normocephalic and atraumatic. Eyes: No scleral icterus.    Cardiovascular: Normal heart view There is a left hip bruno and a screw present. . There is varus angulation fracture line remains visible. Alignment is not significantly changed No foreign body is identified. The patient is status post fixation. Impresion: Findings compatible with prior fixation    Xr Chest Portable    Result Date: 2018  Patient MRN: [id number] : 6/3/1926 Age:  80 years Gender: Male Order Date: 2018 10:45 AM Exam: XR CHEST PORTABLE Number of Images: 1 view Indication:   fever fever Comparison: 1980 Findings: There is a pacemaker present. The heart is enlarged. The lung fields demonstrate pulmonary vascular congestion and edema. The aorta is tortuous ectatic and calcified. Findings compatible with congestive heart failure. Alternatively pneumonia could give this appearance. Findings compatible with atherosclerotic disease of the aorta. Resident's Assessment and Plan     Corinne Alcaraz is a 80 y.o. male with a PMHx of A.fib with RVR, sick sinus syndrome status post pacemaker placement in 2018, HTN, HLD, DM Type II, Hypothyroidism, s/p Left hip ORIF 2018, CKD Stage III, OA, Hx of Bladder ca with recurrent hematuria, and BPH. Acute Encephalopathy  - Likely 2/2 sepsis  - Altered mental status, with increasing confusion, anxiety, and agitation. Oriented x1- to self  - Baseline oriented to self, family, and occasionally to place, pt also able to get out of bed unassisted. - Treat underlying infection  - Monitor for changes in mentation and consider obtaining  CT-head is worsening. Atrial fibrillation with RVR  - Likely 2/2 sepsis  - Pt received Cardizem 25 mg IV in the ED  - Resume on home dose of Atenolol at 25 mg bd with BP parameters  - Hold Coumadin: 4 mg Mon-Fri, and 5 mg Sat and Sun given drop in Hgb and persistent hematuria.   - Monitor HR   - Monitor electrolytes    Hx of sick sinus syndrome  - S/p pacemaker insertion in 2018    HTN  - Well controlled  - Currently on Atenolol and Lasix  - Continue to monitor BP  - Hold BP medication if systolic blood pressure < 90 mmHg    CKD Stage III  - Stable  - GFR 52, Cr 1.3   - Continue IVFs, Lasix 40mg  - Monitor daily BMPs, strict I/Os    DM Type II  - Blood glucose- 115  - HbA1C 6.9 (07/03/18)  - Hold oral meds. POCT ACHS. Hypothyroidism  - TSH 3.8 (02/2018)  - Currently on Synthroid 100 mcg daily    Sepsis  - Likely 2/2 a recurrent UTI from chronic catheterization  - CXR findings suggestive of Pneumonia, although no hx of cough, chest pain, or dyspnea  - Fever Tmax 101.6, tachycardia, tachypnea  - Lactic acidosis 6.0--->5.6, wbc 6.0, PMNs 84.3  - Urinalysis concerning for a UTI and hematuria  - Previous hx of E. Coli UTI  - Follow Urine MCS, Blood cultures  - Trend Lactic acid Q 6  - Continue IV Cefepime 2g daily and IVFs     Recurrent UTI  - May be related to chronic redman cath (unfortunately trial off cath results in urinary retention)    Hx of urinary retention, with chronic redman catheter  - Cath change monthly, last time was yesterday    Hematuria  - Likely 2/2 bladder ca  - Redman catheter draining <100 mls of dark red blood   - Obtain H & H tonight  - Consult to Urology in the morning. BPH  - Continue on Finasteride 4 mg and Tamsulosin 0.4 mg daily  - Pt developed urinary retention post-op and was placed on Mirabegron 25 mg and chronic urethral catheterization. Hx of Left Hip fracture  - S/p Left Hip ORIF in 03/2018    PT/OT evaluation: ordered  DVT prophylaxis/ GI prophylaxis: coumadin/diet  Disposition: telemetry. Patient is DNR Isabella Saldivar MD, PGY-1     Senior Resident Statement  I have seen and examined the patient with the intern. I have discussed the case, including pertinent history and exam findings with the intern. I agree with the assessment, plan and orders as documented by the intern. I have also discussed the plan with the attending on call, Dr. Radha Rich    1. Acute encephalopathy 2/2 urosepsis.  Previous cx with

## 2018-08-04 NOTE — PROGRESS NOTES
Dr. Imer Nettles on floor and notified that patient's paperwork from [facility] shows patient is a DNR-CC.      Lee Ann Cullen

## 2018-08-04 NOTE — ED NOTES
Dr Randa Reddy notified of patients heart rate, no new orders given at this time.       [address] Juliana Caldera, RN  08/04/18 1161

## 2018-08-05 ENCOUNTER — APPOINTMENT (OUTPATIENT)
Dept: GENERAL RADIOLOGY | Age: 83
DRG: 698 | End: 2018-08-05
Payer: MEDICARE

## 2018-08-05 LAB
ANION GAP SERPL CALCULATED.3IONS-SCNC: 18 MMOL/L (ref 7–16)
ANISOCYTOSIS: ABNORMAL
APTT: 38.2 SEC (ref 24.5–35.1)
BASOPHILS ABSOLUTE: 0 E9/L (ref 0–0.2)
BASOPHILS RELATIVE PERCENT: 0.1 % (ref 0–2)
BUN BLDV-MCNC: 36 MG/DL (ref 8–23)
BURR CELLS: ABNORMAL
CALCIUM SERPL-MCNC: 8 MG/DL (ref 8.6–10.2)
CHLORIDE BLD-SCNC: 106 MMOL/L (ref 98–107)
CK MB: 4.2 NG/ML (ref 0–7.7)
CO2: 18 MMOL/L (ref 22–29)
CREAT SERPL-MCNC: 1.8 MG/DL (ref 0.7–1.2)
EOSINOPHILS ABSOLUTE: 0 E9/L (ref 0.05–0.5)
EOSINOPHILS RELATIVE PERCENT: 0 % (ref 0–6)
GFR AFRICAN AMERICAN: 43
GFR NON-AFRICAN AMERICAN: 35 ML/MIN/1.73
GLUCOSE BLD-MCNC: 125 MG/DL (ref 74–109)
HCT VFR BLD CALC: 27.7 % (ref 37–54)
HEMOGLOBIN: 8.9 G/DL (ref 12.5–16.5)
INR BLD: 2.6
LACTIC ACID: 3.4 MMOL/L (ref 0.5–2.2)
LACTIC ACID: 4.1 MMOL/L (ref 0.5–2.2)
LACTIC ACID: 4.1 MMOL/L (ref 0.5–2.2)
LYMPHOCYTES ABSOLUTE: 2.04 E9/L (ref 1.5–4)
LYMPHOCYTES RELATIVE PERCENT: 6.1 % (ref 20–42)
MAGNESIUM: 1.5 MG/DL (ref 1.6–2.6)
MAGNESIUM: 1.8 MG/DL (ref 1.6–2.6)
MCH RBC QN AUTO: 27.8 PG (ref 26–35)
MCHC RBC AUTO-ENTMCNC: 32.1 % (ref 32–34.5)
MCV RBC AUTO: 86.6 FL (ref 80–99.9)
METAMYELOCYTES RELATIVE PERCENT: 7.9 % (ref 0–1)
METER GLUCOSE: 142 MG/DL (ref 70–110)
MONOCYTES ABSOLUTE: 1.36 E9/L (ref 0.1–0.95)
MONOCYTES RELATIVE PERCENT: 4.4 % (ref 2–12)
MYELOCYTE PERCENT: 0.9 % (ref 0–0)
NEUTROPHILS ABSOLUTE: 30.6 E9/L (ref 1.8–7.3)
NEUTROPHILS RELATIVE PERCENT: 80.7 % (ref 43–80)
OVALOCYTES: ABNORMAL
PDW BLD-RTO: 18.8 FL (ref 11.5–15)
PLATELET # BLD: 105 E9/L (ref 130–450)
PMV BLD AUTO: 11.6 FL (ref 7–12)
POIKILOCYTES: ABNORMAL
POLYCHROMASIA: ABNORMAL
POTASSIUM REFLEX MAGNESIUM: 5.1 MMOL/L (ref 3.5–5)
PRO-BNP: ABNORMAL PG/ML (ref 0–450)
PROTHROMBIN TIME: 29.9 SEC (ref 9.3–12.4)
RBC # BLD: 3.2 E12/L (ref 3.8–5.8)
SODIUM BLD-SCNC: 142 MMOL/L (ref 132–146)
TROPONIN: 0.13 NG/ML (ref 0–0.03)
WBC # BLD: 34 E9/L (ref 4.5–11.5)

## 2018-08-05 PROCEDURE — 2500000003 HC RX 250 WO HCPCS: Performed by: INTERNAL MEDICINE

## 2018-08-05 PROCEDURE — 82553 CREATINE MB FRACTION: CPT

## 2018-08-05 PROCEDURE — 85730 THROMBOPLASTIN TIME PARTIAL: CPT

## 2018-08-05 PROCEDURE — 99223 1ST HOSP IP/OBS HIGH 75: CPT | Performed by: INTERNAL MEDICINE

## 2018-08-05 PROCEDURE — 71045 X-RAY EXAM CHEST 1 VIEW: CPT

## 2018-08-05 PROCEDURE — 83880 ASSAY OF NATRIURETIC PEPTIDE: CPT

## 2018-08-05 PROCEDURE — 80048 BASIC METABOLIC PNL TOTAL CA: CPT

## 2018-08-05 PROCEDURE — 85025 COMPLETE CBC W/AUTO DIFF WBC: CPT

## 2018-08-05 PROCEDURE — 6370000000 HC RX 637 (ALT 250 FOR IP): Performed by: INTERNAL MEDICINE

## 2018-08-05 PROCEDURE — 36415 COLL VENOUS BLD VENIPUNCTURE: CPT

## 2018-08-05 PROCEDURE — 82962 GLUCOSE BLOOD TEST: CPT

## 2018-08-05 PROCEDURE — 6360000002 HC RX W HCPCS: Performed by: INTERNAL MEDICINE

## 2018-08-05 PROCEDURE — 2580000003 HC RX 258: Performed by: INTERNAL MEDICINE

## 2018-08-05 PROCEDURE — 83605 ASSAY OF LACTIC ACID: CPT

## 2018-08-05 PROCEDURE — 87040 BLOOD CULTURE FOR BACTERIA: CPT

## 2018-08-05 PROCEDURE — 2000000000 HC ICU R&B

## 2018-08-05 PROCEDURE — 84484 ASSAY OF TROPONIN QUANT: CPT

## 2018-08-05 PROCEDURE — 83735 ASSAY OF MAGNESIUM: CPT

## 2018-08-05 PROCEDURE — 85610 PROTHROMBIN TIME: CPT

## 2018-08-05 RX ORDER — 0.9 % SODIUM CHLORIDE 0.9 %
500 INTRAVENOUS SOLUTION INTRAVENOUS ONCE
Status: COMPLETED | OUTPATIENT
Start: 2018-08-05 | End: 2018-08-05

## 2018-08-05 RX ORDER — SODIUM CHLORIDE, SODIUM LACTATE, POTASSIUM CHLORIDE, CALCIUM CHLORIDE 600; 310; 30; 20 MG/100ML; MG/100ML; MG/100ML; MG/100ML
INJECTION, SOLUTION INTRAVENOUS CONTINUOUS
Status: DISCONTINUED | OUTPATIENT
Start: 2018-08-05 | End: 2018-08-05

## 2018-08-05 RX ORDER — MAGNESIUM SULFATE 1 G/100ML
1 INJECTION INTRAVENOUS ONCE
Status: COMPLETED | OUTPATIENT
Start: 2018-08-05 | End: 2018-08-05

## 2018-08-05 RX ORDER — QUETIAPINE FUMARATE 25 MG/1
25 TABLET, FILM COATED ORAL NIGHTLY
Status: DISCONTINUED | OUTPATIENT
Start: 2018-08-05 | End: 2018-08-07

## 2018-08-05 RX ORDER — LIDOCAINE HYDROCHLORIDE 10 MG/ML
INJECTION, SOLUTION EPIDURAL; INFILTRATION; INTRACAUDAL; PERINEURAL
Status: DISCONTINUED
Start: 2018-08-05 | End: 2018-08-06

## 2018-08-05 RX ORDER — PETROLATUM 42 G/100G
OINTMENT TOPICAL 2 TIMES DAILY PRN
Status: DISCONTINUED | OUTPATIENT
Start: 2018-08-05 | End: 2018-08-09 | Stop reason: HOSPADM

## 2018-08-05 RX ADMIN — MICONAZOLE NITRATE: 20 CREAM TOPICAL at 09:30

## 2018-08-05 RX ADMIN — SODIUM CHLORIDE 500 ML: 9 INJECTION, SOLUTION INTRAVENOUS at 09:05

## 2018-08-05 RX ADMIN — SIMVASTATIN 40 MG: 40 TABLET, FILM COATED ORAL at 22:03

## 2018-08-05 RX ADMIN — ACETAMINOPHEN 650 MG: 325 TABLET, FILM COATED ORAL at 04:22

## 2018-08-05 RX ADMIN — PETROLATUM: 42 OINTMENT TOPICAL at 09:30

## 2018-08-05 RX ADMIN — PIPERACILLIN SODIUM AND TAZOBACTAM SODIUM 3.38 G: 3; .375 INJECTION, POWDER, LYOPHILIZED, FOR SOLUTION INTRAVENOUS at 08:21

## 2018-08-05 RX ADMIN — Medication 10 ML: at 09:06

## 2018-08-05 RX ADMIN — MICONAZOLE NITRATE: 20 CREAM TOPICAL at 22:03

## 2018-08-05 RX ADMIN — Medication 2 MCG/MIN: at 14:01

## 2018-08-05 RX ADMIN — MELATONIN 3 MG ORAL TABLET 6 MG: 3 TABLET ORAL at 22:03

## 2018-08-05 RX ADMIN — QUETIAPINE FUMARATE 25 MG: 25 TABLET ORAL at 22:47

## 2018-08-05 RX ADMIN — SODIUM CHLORIDE: 9 INJECTION, SOLUTION INTRAVENOUS at 03:54

## 2018-08-05 RX ADMIN — LEVOTHYROXINE SODIUM 100 MCG: 100 TABLET ORAL at 06:28

## 2018-08-05 RX ADMIN — Medication 10 ML: at 00:07

## 2018-08-05 RX ADMIN — PIPERACILLIN SODIUM AND TAZOBACTAM SODIUM 3.38 G: 3; .375 INJECTION, POWDER, LYOPHILIZED, FOR SOLUTION INTRAVENOUS at 15:01

## 2018-08-05 RX ADMIN — PIPERACILLIN SODIUM AND TAZOBACTAM SODIUM 3.38 G: 3; .375 INJECTION, POWDER, LYOPHILIZED, FOR SOLUTION INTRAVENOUS at 23:46

## 2018-08-05 RX ADMIN — VANCOMYCIN HYDROCHLORIDE 1000 MG: 1 INJECTION, POWDER, LYOPHILIZED, FOR SOLUTION INTRAVENOUS at 11:28

## 2018-08-05 RX ADMIN — MAGNESIUM SULFATE IN DEXTROSE 1 G: 10 INJECTION, SOLUTION INTRAVENOUS at 01:33

## 2018-08-05 RX ADMIN — VITAMIN D, TAB 1000IU (100/BT) 1000 UNITS: 25 TAB at 09:05

## 2018-08-05 ASSESSMENT — PAIN SCALES - PAIN ASSESSMENT IN ADVANCED DEMENTIA (PAINAD)
BREATHING: 0
NEGVOCALIZATION: 2
NEGVOCALIZATION: 0
FACIALEXPRESSION: 0
FACIALEXPRESSION: 0
BODYLANGUAGE: 1
CONSOLABILITY: 1
TOTALSCORE: 1
BREATHING: 1
BREATHING: 0
CONSOLABILITY: 1
TOTALSCORE: 5
BODYLANGUAGE: 0

## 2018-08-05 ASSESSMENT — PAIN DESCRIPTION - ONSET: ONSET: ON-GOING

## 2018-08-05 ASSESSMENT — PAIN DESCRIPTION - FREQUENCY: FREQUENCY: INTERMITTENT

## 2018-08-05 ASSESSMENT — PAIN SCALES - GENERAL
PAINLEVEL_OUTOF10: 0
PAINLEVEL_OUTOF10: 10

## 2018-08-05 ASSESSMENT — PAIN DESCRIPTION - PAIN TYPE
TYPE: ACUTE PAIN
TYPE: ACUTE PAIN

## 2018-08-05 ASSESSMENT — PAIN DESCRIPTION - ORIENTATION
ORIENTATION: LEFT
ORIENTATION: LEFT

## 2018-08-05 ASSESSMENT — PAIN DESCRIPTION - DESCRIPTORS: DESCRIPTORS: ACHING;DISCOMFORT

## 2018-08-05 ASSESSMENT — PAIN DESCRIPTION - LOCATION
LOCATION: LEG
LOCATION: LEG

## 2018-08-05 ASSESSMENT — PAIN DESCRIPTION - PROGRESSION: CLINICAL_PROGRESSION: RAPIDLY WORSENING

## 2018-08-05 NOTE — PROGRESS NOTES
Dr. Mcallister No on floor and notified of patient's positive blood cultures. Also informed him that Dr. Chinyere Bean did see the patient this morning.      Nathalie Geronimo

## 2018-08-05 NOTE — CONSULTS
Medical Intensive Care Unit     [facility]  MICU Consult Note    Date and time: 8/5/2018 1:25 PM  Patient's name:  Alexis Caceres Record Number: [id number]  Patient's account/billing number: [de-identified]  Patient's YOB: 1926  Age: 80 y.o. Date of Admission: 8/4/2018 10:26 AM  Length of stay during current admission: 1    Primary Care Physician: Natalee Anthony MD  ICU Attending Physician: Dr. Sarah Carroll Status: Limited    Reason for ICU admission: Septic shock    Subjective     HPI mentioned in H&P. Interval History:   Overnight, the patient developed Atrial fibrillation RVR for which cardizem drip was started. Then the patient developed hypotension. Cardizem drip was stopped. The patient's BP was still low after about 4.5 liters of fluid boluses and IVF maintenance. The patient has become more lethargic. He has had some mild productive cough. But denies any chest pain, SOB, palpitation, headache, change in bowel movements. Labs were significant for worsening creatinine 1.3 - 1.8 mg.dL (baseline around 1.5 mg/dL), leukocytosis. Urine culture was positive for E.coli, blood cultures (+) 2 set for gram (-) bruno. The patient's CXR looks b/l infiltrates + elevated proBNP. ID & urology were consulted on route. Broad spectrum ABX was started with vanc and zosyn. The patient was transferred down to MICU for further management of septic shock 2nd to urosepsis.       CURRENT VENTILATION STATUS:   [] Ventilator  [] BIPAP  [] Nasal Cannula [x] Room Air      IF INTUBATED, ET TUBE MARKING AT LOWER LIP:  X     cms    SECRETIONS   Amount:  [x] Small [] Moderate  [] Large [] None    Color:     [x] White [] Colored  [] Bloody    SEDATION:  RAAS Score:   [] Propofol gtt  [] Versed gtt  [] Ativan gtt   [] No Sedation    PARALYZED:  [x] No    [] Yes    VASOPRESSORS:  [] No    [x] Yes    If yes -   [x] Levophed       [] Dopamine     [] Vasopressin       [] Dobutamine  [] Summary (Last 24 hours) at 08/05/18 1325  Last data filed at 08/05/18 1231   Gross per 24 hour   Intake             1210 ml   Output             1000 ml   Net              210 ml     Wt Readings from Last 2 Encounters:   08/04/18 164 lb 3.2 oz (74.5 kg)   06/30/18 171 lb (77.6 kg)     Body mass index is 21.66 kg/m².       PHYSICAL EXAMINATION:  General appearance - dehydrated, anxious and in mild to moderate distress  Mental status - confused  Eyes - pupils equal and reactive, extraocular eye movements intact  Ears - bilateral TM's and external ear canals normal  Nose - normal and patent, no erythema, discharge or polyps  Mouth - mucous membranes moist, pharynx normal without lesions  Neck - supple, no significant adenopathy  Chest - clear to auscultation, symmetric air entry, rales noted bibasilar   Heart - S1 and S2 normal, no murmurs noted  Abdomen - soft, nontender, nondistended, no masses or organomegaly  Neurological - alert, oriented, normal speech, no focal findings or movement disorder noted}  Extremities - peripheral pulses normal, no pedal edema, no clubbing or cyanosis  Skin - normal coloration and turgor, no rashes, no suspicious skin lesions noted   Any additional physical findings:    MEDICATIONS:  Scheduled Meds:   vancomycin  15 mg/kg Intravenous Q24H    piperacillin-tazobactam  3.375 g Intravenous Q8H    miconazole   Topical BID    sodium chloride flush  10 mL Intravenous 2 times per day    vitamin D  1,000 Units Oral Daily    levothyroxine  100 mcg Oral Daily    melatonin  6 mg Oral Nightly    simvastatin  40 mg Oral Nightly    sodium chloride flush  10 mL Intravenous 2 times per day     Continuous Infusions:   norepinephrine Stopped (08/05/18 1238)     PRN Meds:     mineral oil-hydrophilic petrolatum  BID PRN   sodium chloride flush 10 mL PRN   acetaminophen 650 mg Q4H PRN   sodium chloride flush 10 mL PRN   magnesium hydroxide 30 mL Daily PRN   ondansetron 4 mg Q6H PRN       VENT SETTINGS (Comprehensive) (if applicable): Additional Respiratory  Assessments  Pulse: 82  Resp: 25  SpO2: 98 %    ABGs:   No results for input(s): PH, PCO2, PO2, HCO3, BE, O2SAT in the last 72 hours. Laboratory findings:  Complete Blood Count: Recent Labs      08/04/18   1053  08/04/18   1800  08/04/18   2327  08/05/18   0520   WBC  6.0   --    --   34.0*   HGB  12.7  10.2*  9.7*  8.9*   HCT  38.8  31.2*  30.9*  27.7*   PLT  144   --    --   105*        Last 3 Blood Glucose:   Recent Labs      08/04/18   1129  08/05/18   0520   GLUCOSE  115*  125*        PT/INR:    Lab Results   Component Value Date    PROTIME 29.9 08/05/2018    INR 2.6 08/05/2018     PTT:    Lab Results   Component Value Date    APTT 38.2 08/05/2018       Comprehensive Metabolic Profile:   Recent Labs      08/04/18   1129  08/05/18   0520   NA  141  142   K  4.2  5.1*   CL  104  106   CO2  22  18*   BUN  24*  36*   CREATININE  1.3*  1.8*   GLUCOSE  115*  125*   CALCIUM  8.6  8.0*   PROT  6.4   --    LABALBU  3.3*   --    BILITOT  0.6   --    ALKPHOS  99   --    AST  13   --    ALT  5   --       Magnesium:   Lab Results   Component Value Date    MG 1.8 08/05/2018     Phosphorus:   Lab Results   Component Value Date    PHOS 3.6 06/30/2018     Ionized Calcium: No results found for: CAION   Troponin:   Recent Labs      08/05/18   1051   TROPONINI  0.13*     Urinalysis: Urine dipstick shows positive for WBC's. Micro exam: +++ bacteria. Microbiology: send out     Other pertinent Labs: reviewed    Radiology/Imaging:   Chest Xray (8/5/2018):    Comparison: Marci Boast is made to AP view of the chest dated   08/04/2018.       Findings: The cardiomediastinal silhouette is enlarged with   calcifications of the aortic arch. There are hazy bilateral lower lobe   airspace opacities, slightly worsened since the previous examination. There is no pneumothorax.  The visualized osseous structures   demonstrate degenerative changes.       Lines and Tubes: left hip ORIF 3/2018    WEAN PER PROTOCOL:  [] No   [] Yes  [x] N/A    ICU PROPHYLAXIS:  Stress ulcer:  [x] PPI Agent  [] Z2Wstuy [] Sucralfate  [] Other:  VTE:   [] Enoxaparin  [x] Unfract. Heparin Subcut  [] EPC Cuffs    NUTRITION:  [] NPO [] Tube Feeding (Specify: ) [] TPN  [x] PO (Diet: DIET DENTAL SOFT;)    INSULIN DRIP:   [x] No   [] Yes    CONSULTATION NEEDED:   [] No   [x] Yes    FAMILY UPDATED:    [] No   [x] Yes    TRANSFER OUT OF ICU:   [x] No   [] Yes    Obdulio Maravilla MD PGY- 2     Attending Physician: Dr. Susannah Alcala     8/5/2018, 1:25 PM   Addendum ICU Attending Statement     Paul Santos was seen, examined and discussed with the multi-disciplinary ICU team during rounds. A addendum note may be separate to the residents note. If not then, I have personally seen and examined the patient and the key elements of the encounter were performed by me (> 85 % time). The medications & laboratory data was discussed and adjusted where necessary. The radiographic images were reviewed either as a group or with radiologist.  Any changes are document or changed if felt dis-concordant with the exam or history. The above findings were corroborated, plans confirmed and changes made if needed. Family was updated at the bedside as available. Key issues of the case were discussed among consultants. Critical Care time is documented if appropriate.       Leyda Ulloa DO, MPH  Professor of Medicine

## 2018-08-05 NOTE — PLAN OF CARE
Dr. Celso Ratliff RN [facility] returned call. Telephone orders received.  Juan Zuleta 6:27 PM

## 2018-08-05 NOTE — PROGRESS NOTES
Stage  CI HR MAP TPRI SVI   Baseline 2.6 84   31   Challenge 2.8 84   33   Result (%Ä) 6.1 0   6.8     8/5/2018 12:51 PM Alessandra Cline

## 2018-08-05 NOTE — PLAN OF CARE
Notified Dr. Joan Castaneda that patient's MAP has been 63-83. He would still like the Levophed that was ordered to be started.  Isha Mckeon

## 2018-08-05 NOTE — PLAN OF CARE
Noted episodes of hypotension overnight. We initially re-evaluated the patient at midnight after noting BP 78/50. At that time mentation was unchanged (oriented to self, following commands). NS bolus was still running. Still with hematuria. Repeat BP was 87/75 HR 98. I increased his IVF to 150cc/hr. BP remained low this am.    He is DNR CC. I called his daughter, Mario Linaers, and updated her regarding the status of her father. Hemoglobin significantly dropped from 12 to 10 in a few hours and continues to slowly trend down. He also has RENUKA and leukocytosis this am. LA slowly trending down. I have discontinued meds for HTN, BPH, and warfarin. I discussed possible ICU transfer because of  possible septic and hemorrhagic shock. I am concerned about giving him more fluids given his RENUKA and we do not know his cardiac function, may need pressors. I started broad spectrum antibiotics as well. She would like to talk with the rest of the family first before they decide on further goals of care.       Electronically signed by Suresh Poe MD on 8/5/2018 at 7:55 AM

## 2018-08-05 NOTE — CONSULTS
°C) (Temporal)   Resp 25   Ht 6' 1\" (1.854 m)   Wt 164 lb 3.2 oz (74.5 kg)   SpO2 98%   BMI 21.66 kg/m²    VENT SETTINGS:        General appearance: Alert, Awake, Oriented times 3, no distress  Skin: Warm and dry  Eyes: Pink palpebral conjunctivae. PERRL  Ears: External ears normal. No tragal tenderness. TM intact  Nose/Sinuses: Nares normal. Septum midline. Mucosa normal. No sinus tenderness. Oropharynx: Oropharynx clear with no exudates seen  Neck: Neck supple. No jugular venous distension, lymphadenopathy or thyromegaly Trachea midline  Lungs: Lungs clear to auscultation bilaterally. No rhonchi, crackles or wheezes  Heart: S1 S2  Irregular  Abdomen: Abdomen soft, non-tender. BS normal. No masses, organomegaly  Extremities: No edema, Peripheral pulses palpable  Musculoskeletal: Muscular strength appears intact.  No joint effusion, tenderness, swelling or warmth      DATA:    Labs:     Last 3 CBC:  Recent Labs      08/04/18   1053  08/04/18   1800  08/04/18   2327  08/05/18   0520   WBC  6.0   --    --   34.0*   RBC  4.55   --    --   3.20*   HGB  12.7  10.2*  9.7*  8.9*   PLT  144   --    --   105*   MPV  10.9   --    --   11.6       Last 3 BMP  Recent Labs      08/04/18   1129  08/05/18   0520   NA  141  142   K  4.2  5.1*   CL  104  106   CO2  22  18*   BUN  24*  36*   CREATININE  1.3*  1.8*   GLUCOSE  115*  125*   CALCIUM  8.6  8.0*       LIVER PROFILE:  Recent Labs      08/04/18   1129   AST  13   ALT  5   LABALBU  3.3*       URINARY CATHETER OUTPUT (Spann):  Urethral Catheter Double-lumen 16 fr-Output (mL): 100 mL  Microbiology :  Recent Labs      08/04/18   1050   BC  Gram stain performed from blood culture bottle media  Gram negative rods  *     Recent Labs      08/04/18   1053   BLOODCULT2  Gram stain performed from blood culture bottle media  Gram negative rods  *     Recent Labs      08/04/18   1110   LABURIN  >100,000 CFU/ml  Sensitivity to follow       No results for input(s): CULTRESP in the last 72 hours. No results for input(s): WNDABS in the last 72 hours. Radiology :  XR CHEST PORTABLE   Final Result   Since examination dated 08/04/2018, slightly worsened hazy   bilateral lower lobe airspace opacities. Recommend clinical   correlation. US NONVASCULAR TRUNK SCAN   Final Result   The palpable finding in the right lower quadrant is in   the subcutaneous fat, and is a primarily fluid filled structure with   some debris, suggesting a resolving hematoma or, if the patient has   been subjected to Ysitie 68 procedure, it could be a small seroma. Abscess is felt to be unlikely. The finding should be amenable to percutaneous sampling/aspiration   under physical exam palpation or ultrasound. XR CHEST PORTABLE   Final Result   Findings compatible with congestive heart failure. Alternatively pneumonia could give this appearance. Findings compatible with atherosclerotic disease of the aorta.                      XR HIP LEFT (1 VIEW)   Final Result              Assessment and Plan:      Complicated UTI/CAUTI  GN bactremia from above likely after recent cath exchange causing trauma  Septic shock  Lactic acidosis  H/O Bladder CA    Plan  - D/C vancomycin  - Continue with zosyn for now pending susceptibility for Benita Cardoso, being a [facility] resident resistance can be expected  - repeat 2 sets of bld cultures  - urology on board    Discussed with Dr Chuckie Zapata    Thank you for your consult, please call for any Aureliano Clark MD

## 2018-08-05 NOTE — PLAN OF CARE
Called Dr. Roger Whitman answering service as directed by perfect serve to ask if he wanted the urinary catheter exchanged per protocol, as it was inserted outside of the facility, but he is having gross hematuria.  Joanne Yoon 6:27 PM

## 2018-08-05 NOTE — PROGRESS NOTES
[address] updated on patient's latest status and vital signs. Will monitor patient closely.  Joanne Prince

## 2018-08-05 NOTE — PROGRESS NOTES
[facility]  Internal Medicine Residency Program  Progress Note - House Team 2    Patient:  Adia Trujillo 80 y.o. male MRN: [id number]     Date of Service: 8/5/2018     CC: Altered Mental Status and Hematuria  Overnight events: The patient's BP continued to drop, and Atenolol was discontinued. Hemoglobin also on a down zelaya trend. IV antibiotics was changed to Vancomycin and Zosyn. Finasteride and tamsulosin also Discontinued    Subjective     The patient was seen and examined this morning. He has no new complaints. He is still drowsy but arousable. Urinary catheter still draining sanguinous fluid. Objective     Physical Exam:  · Vitals: BP (!) 76/48 Comment: manual   Pulse 88   Temp 97.9 °F (36.6 °C) (Temporal)   Resp 20   Ht 6' 1\" (1.854 m)   Wt 164 lb 3.2 oz (74.5 kg)   SpO2 97%   BMI 21.66 kg/m²     I & O - 24hr: I/O last 3 completed shifts: In: 9489 [P.O.:10; I.V.:3200]  Out: 900 [Urine:900]     Physical Exam   Constitutional: No distress. HENT:   Head: Normocephalic and atraumatic. Eyes: Conjunctivae are normal. Pupils are equal, round, and reactive to light. Neck: Normal range of motion. Neck supple. Cardiovascular: Normal rate, regular rhythm, normal heart sounds and intact distal pulses. Exam reveals no gallop and no friction rub. No murmur heard. Pulmonary/Chest: Effort normal and breath sounds normal. No respiratory distress. He has no wheezes. He has no rales. He exhibits no tenderness. Abdominal: Soft. Bowel sounds are normal. He exhibits no distension. There is no tenderness. There is no guarding. Musculoskeletal: He exhibits no edema, tenderness or deformity. Marked dermatitis over lower extremities   Neurological: No cranial nerve deficit. He exhibits normal muscle tone. Drowsy but arousable, oriented X 1 ( to place)   Skin: He is not diaphoretic.        Pertinent Labs & Imaging Studies   yessi  CBC:   Lab Results   Component Value Date    WBC 34.0 08/05/2018    RBC 3.20 08/05/2018    HGB 8.9 08/05/2018    HCT 27.7 08/05/2018    MCV 86.6 08/05/2018    MCH 27.8 08/05/2018    MCHC 32.1 08/05/2018    RDW 18.8 08/05/2018     08/05/2018    MPV 11.6 08/05/2018     Hemoglobin/Hematocrit:    Lab Results   Component Value Date    HGB 8.9 08/05/2018    HCT 27.7 08/05/2018     BMP:    Lab Results   Component Value Date     08/05/2018    K 5.1 08/05/2018     08/05/2018    CO2 18 08/05/2018    BUN 36 08/05/2018    LABALBU 3.3 08/04/2018    CREATININE 1.8 08/05/2018    CALCIUM 8.0 08/05/2018    GFRAA 43 08/05/2018    LABGLOM 35 08/05/2018    GLUCOSE 125 08/05/2018     BUN/Creatinine:    Lab Results   Component Value Date    BUN 36 08/05/2018    CREATININE 1.8 08/05/2018     PT/INR:    Lab Results   Component Value Date    PROTIME 29.9 08/05/2018    INR 2.6 08/05/2018   PTT:    Lab Results   Component Value Date    APTT 38.2 08/05/2018   [APTT}  Last 3 Troponin:    Lab Results   Component Value Date    TROPONINI 0.02 06/14/2018    TROPONINI <0.01 03/25/2018    TROPONINI <0.01 03/24/2018     U/A:    Lab Results   Component Value Date    COLORU RED 08/04/2018    PROTEINU 100 08/04/2018    PHUR 7.0 08/04/2018    WBCUA >20 08/04/2018    RBCUA PACKED 08/04/2018    RBCUA NONE 03/11/2014    BACTERIA MANY 08/04/2018    CLARITYU CLOUDY 08/04/2018    SPECGRAV 1.015 08/04/2018    LEUKOCYTESUR LARGE 08/04/2018    UROBILINOGEN 1.0 08/04/2018    BILIRUBINUR SMALL 08/04/2018    BLOODU LARGE 08/04/2018    GLUCOSEU Negative 08/04/2018       Resident's Assessment and Plan     Adia Trujillo is a 80 y.o. male  With PMHx of A.fib with RVR, sick sinus syndrome status post pacemaker placement in 03/2018, HTN, HLD, DM Type II, Hypothyroidism, s/p Left hip ORIF 03/2018, CKD Stage III, OA, Hx of Prostate ca with recurrent hematuria, and BPH.      1. Sepsis  - Likely 2/2 a recurrent UTI from chronic catheterization  - CXR findings suggestive of Pneumonia, although no hx of cough, with chronic redman catheter  - Cath change monthly, last time was 8/3/2018     11. Hematuria  - Likely 2/2 bladder ca  - Redman catheter draining serosanguinous fluid   - Trend H and H, INR, APTT today  - Consult to Urology     12. BPH  - Pt developed urinary retention post-op and was placed on Mirabegron 25 mg and chronic urethral catheterization.  - Hold Finasteride 4 mg and Tamsulosin 0.4 mg due to dropping BP     13. Hx of Left Hip fracture  - S/p Left Hip ORIF in 03/2018     PT/OT evaluation: ordered  DVT prophylaxis/ GI prophylaxis: coumadin stopped/diet  Disposition: telemetry.      Ruby Ramirez MD, PGY-1  Attending physician: Dr. Jason Sr

## 2018-08-05 NOTE — PLAN OF CARE
Problem: Falls - Risk of:  Goal: Will remain free from falls  Will remain free from falls   Outcome: Met This Shift    Goal: Absence of physical injury  Absence of physical injury   Outcome: Met This Shift      Problem: Risk for Impaired Skin Integrity  Goal: Tissue integrity - skin and mucous membranes  Structural intactness and normal physiological function of skin and  mucous membranes. Outcome: Not Met This Shift      Problem: Discharge Planning:  Goal: Discharged to appropriate level of care  Discharged to appropriate level of care   Outcome: Ongoing      Problem: Gas Exchange - Impaired:  Goal: Levels of oxygenation will improve  Levels of oxygenation will improve   Outcome: Completed Date Met: 08/05/18      Problem: Infection, Septic Shock:  Goal: Will show no infection signs and symptoms  Will show no infection signs and symptoms   Outcome: Not Met This Shift      Problem: Tissue Perfusion, Altered:  Goal: Circulatory function within specified parameters  Circulatory function within specified parameters   Outcome: Not Met This Shift      Comments: Plan of care reviewed with patient and family, as available.  Zoey Oconnell

## 2018-08-06 LAB
ANION GAP SERPL CALCULATED.3IONS-SCNC: 12 MMOL/L (ref 7–16)
ANION GAP SERPL CALCULATED.3IONS-SCNC: 16 MMOL/L (ref 7–16)
ANISOCYTOSIS: ABNORMAL
BASOPHILS ABSOLUTE: 0 E9/L (ref 0–0.2)
BASOPHILS RELATIVE PERCENT: 0.3 % (ref 0–2)
BUN BLDV-MCNC: 38 MG/DL (ref 8–23)
BUN BLDV-MCNC: 40 MG/DL (ref 8–23)
BURR CELLS: ABNORMAL
CALCIUM SERPL-MCNC: 8 MG/DL (ref 8.6–10.2)
CALCIUM SERPL-MCNC: 8.5 MG/DL (ref 8.6–10.2)
CHLORIDE BLD-SCNC: 109 MMOL/L (ref 98–107)
CHLORIDE BLD-SCNC: 111 MMOL/L (ref 98–107)
CK MB: 3 NG/ML (ref 0–7.7)
CO2: 17 MMOL/L (ref 22–29)
CO2: 20 MMOL/L (ref 22–29)
CREAT SERPL-MCNC: 1.4 MG/DL (ref 0.7–1.2)
CREAT SERPL-MCNC: 1.4 MG/DL (ref 0.7–1.2)
EOSINOPHILS ABSOLUTE: 0.26 E9/L (ref 0.05–0.5)
EOSINOPHILS RELATIVE PERCENT: 0.9 % (ref 0–6)
GFR AFRICAN AMERICAN: 57
GFR AFRICAN AMERICAN: 57
GFR NON-AFRICAN AMERICAN: 47 ML/MIN/1.73
GFR NON-AFRICAN AMERICAN: 47 ML/MIN/1.73
GLUCOSE BLD-MCNC: 142 MG/DL (ref 74–109)
GLUCOSE BLD-MCNC: 88 MG/DL (ref 74–109)
HCT VFR BLD CALC: 27.8 % (ref 37–54)
HCT VFR BLD CALC: 29.2 % (ref 37–54)
HEMOGLOBIN: 9.3 G/DL (ref 12.5–16.5)
HEMOGLOBIN: 9.8 G/DL (ref 12.5–16.5)
INR BLD: 2.3
LACTIC ACID: 1.7 MMOL/L (ref 0.5–2.2)
LYMPHOCYTES ABSOLUTE: 0.29 E9/L (ref 1.5–4)
LYMPHOCYTES RELATIVE PERCENT: 0.9 % (ref 20–42)
MAGNESIUM: 1.8 MG/DL (ref 1.6–2.6)
MAGNESIUM: 2.1 MG/DL (ref 1.6–2.6)
MCH RBC QN AUTO: 28.1 PG (ref 26–35)
MCHC RBC AUTO-ENTMCNC: 33.5 % (ref 32–34.5)
MCV RBC AUTO: 84 FL (ref 80–99.9)
METER GLUCOSE: 121 MG/DL (ref 70–110)
METER GLUCOSE: 129 MG/DL (ref 70–110)
METER GLUCOSE: 86 MG/DL (ref 70–110)
MONOCYTES ABSOLUTE: 1.16 E9/L (ref 0.1–0.95)
MONOCYTES RELATIVE PERCENT: 4.3 % (ref 2–12)
NEUTROPHILS ABSOLUTE: 27.17 E9/L (ref 1.8–7.3)
NEUTROPHILS RELATIVE PERCENT: 93.9 % (ref 43–80)
ORGANISM: ABNORMAL
OVALOCYTES: ABNORMAL
PDW BLD-RTO: 18.6 FL (ref 11.5–15)
PLATELET # BLD: 101 E9/L (ref 130–450)
PMV BLD AUTO: 11.8 FL (ref 7–12)
POIKILOCYTES: ABNORMAL
POTASSIUM REFLEX MAGNESIUM: 4.1 MMOL/L (ref 3.5–5)
POTASSIUM SERPL-SCNC: 4.1 MMOL/L (ref 3.5–5)
PROTHROMBIN TIME: 26.7 SEC (ref 9.3–12.4)
RBC # BLD: 3.31 E12/L (ref 3.8–5.8)
SCHISTOCYTES: ABNORMAL
SODIUM BLD-SCNC: 142 MMOL/L (ref 132–146)
SODIUM BLD-SCNC: 143 MMOL/L (ref 132–146)
TROPONIN: 0.12 NG/ML (ref 0–0.03)
URINE CULTURE, ROUTINE: ABNORMAL
URINE CULTURE, ROUTINE: ABNORMAL
WBC # BLD: 28.9 E9/L (ref 4.5–11.5)

## 2018-08-06 PROCEDURE — 6360000002 HC RX W HCPCS: Performed by: INTERNAL MEDICINE

## 2018-08-06 PROCEDURE — 6370000000 HC RX 637 (ALT 250 FOR IP): Performed by: INTERNAL MEDICINE

## 2018-08-06 PROCEDURE — 93005 ELECTROCARDIOGRAM TRACING: CPT | Performed by: INTERNAL MEDICINE

## 2018-08-06 PROCEDURE — 2500000003 HC RX 250 WO HCPCS: Performed by: INTERNAL MEDICINE

## 2018-08-06 PROCEDURE — 51702 INSERT TEMP BLADDER CATH: CPT

## 2018-08-06 PROCEDURE — 83735 ASSAY OF MAGNESIUM: CPT

## 2018-08-06 PROCEDURE — 80048 BASIC METABOLIC PNL TOTAL CA: CPT

## 2018-08-06 PROCEDURE — 82553 CREATINE MB FRACTION: CPT

## 2018-08-06 PROCEDURE — 85025 COMPLETE CBC W/AUTO DIFF WBC: CPT

## 2018-08-06 PROCEDURE — 99233 SBSQ HOSP IP/OBS HIGH 50: CPT | Performed by: INTERNAL MEDICINE

## 2018-08-06 PROCEDURE — 83605 ASSAY OF LACTIC ACID: CPT

## 2018-08-06 PROCEDURE — 84484 ASSAY OF TROPONIN QUANT: CPT

## 2018-08-06 PROCEDURE — 2580000003 HC RX 258: Performed by: EMERGENCY MEDICINE

## 2018-08-06 PROCEDURE — 2140000000 HC CCU INTERMEDIATE R&B

## 2018-08-06 PROCEDURE — 85018 HEMOGLOBIN: CPT

## 2018-08-06 PROCEDURE — 85014 HEMATOCRIT: CPT

## 2018-08-06 PROCEDURE — 36415 COLL VENOUS BLD VENIPUNCTURE: CPT

## 2018-08-06 PROCEDURE — 2580000003 HC RX 258: Performed by: INTERNAL MEDICINE

## 2018-08-06 PROCEDURE — 85610 PROTHROMBIN TIME: CPT

## 2018-08-06 PROCEDURE — 92610 EVALUATE SWALLOWING FUNCTION: CPT

## 2018-08-06 PROCEDURE — 82962 GLUCOSE BLOOD TEST: CPT

## 2018-08-06 RX ORDER — DIGOXIN 0.25 MG/ML
250 INJECTION INTRAMUSCULAR; INTRAVENOUS ONCE
Status: COMPLETED | OUTPATIENT
Start: 2018-08-06 | End: 2018-08-06

## 2018-08-06 RX ORDER — WARFARIN SODIUM 5 MG/1
5 TABLET ORAL WEEKLY
Status: DISCONTINUED | OUTPATIENT
Start: 2018-08-06 | End: 2018-08-06

## 2018-08-06 RX ORDER — MAGNESIUM SULFATE IN WATER 40 MG/ML
2 INJECTION, SOLUTION INTRAVENOUS ONCE
Status: COMPLETED | OUTPATIENT
Start: 2018-08-06 | End: 2018-08-06

## 2018-08-06 RX ORDER — METOPROLOL TARTRATE 5 MG/5ML
2.5 INJECTION INTRAVENOUS ONCE
Status: COMPLETED | OUTPATIENT
Start: 2018-08-06 | End: 2018-08-06

## 2018-08-06 RX ORDER — DIVALPROEX SODIUM 125 MG/1
125 TABLET, DELAYED RELEASE ORAL 3 TIMES DAILY
Status: DISCONTINUED | OUTPATIENT
Start: 2018-08-06 | End: 2018-08-09 | Stop reason: HOSPADM

## 2018-08-06 RX ADMIN — CEFTRIAXONE 2 G: 2 INJECTION, POWDER, FOR SOLUTION INTRAMUSCULAR; INTRAVENOUS at 12:38

## 2018-08-06 RX ADMIN — Medication 10 ML: at 21:26

## 2018-08-06 RX ADMIN — SIMVASTATIN 40 MG: 40 TABLET, FILM COATED ORAL at 21:26

## 2018-08-06 RX ADMIN — MICONAZOLE NITRATE: 20 CREAM TOPICAL at 09:52

## 2018-08-06 RX ADMIN — MAGNESIUM SULFATE HEPTAHYDRATE 2 G: 40 INJECTION, SOLUTION INTRAVENOUS at 22:04

## 2018-08-06 RX ADMIN — Medication 10 ML: at 23:51

## 2018-08-06 RX ADMIN — QUETIAPINE FUMARATE 25 MG: 25 TABLET ORAL at 21:26

## 2018-08-06 RX ADMIN — PIPERACILLIN SODIUM AND TAZOBACTAM SODIUM 3.38 G: 3; .375 INJECTION, POWDER, LYOPHILIZED, FOR SOLUTION INTRAVENOUS at 06:53

## 2018-08-06 RX ADMIN — DIGOXIN 250 MCG: 250 INJECTION, SOLUTION INTRAMUSCULAR; INTRAVENOUS; PARENTERAL at 23:42

## 2018-08-06 RX ADMIN — DIVALPROEX SODIUM 125 MG: 125 TABLET, DELAYED RELEASE ORAL at 16:18

## 2018-08-06 RX ADMIN — LEVOTHYROXINE SODIUM 100 MCG: 100 TABLET ORAL at 06:54

## 2018-08-06 RX ADMIN — MICONAZOLE NITRATE: 20 CREAM TOPICAL at 21:26

## 2018-08-06 RX ADMIN — METOPROLOL TARTRATE 2.5 MG: 1 INJECTION, SOLUTION INTRAVENOUS at 22:12

## 2018-08-06 RX ADMIN — DIVALPROEX SODIUM 125 MG: 125 TABLET, DELAYED RELEASE ORAL at 21:26

## 2018-08-06 RX ADMIN — MELATONIN 3 MG ORAL TABLET 6 MG: 3 TABLET ORAL at 21:26

## 2018-08-06 ASSESSMENT — PAIN SCALES - GENERAL
PAINLEVEL_OUTOF10: 0

## 2018-08-06 ASSESSMENT — PAIN SCALES - PAIN ASSESSMENT IN ADVANCED DEMENTIA (PAINAD)
TOTALSCORE: 0
BREATHING: 0
CONSOLABILITY: 0
BODYLANGUAGE: 0
NEGVOCALIZATION: 0
FACIALEXPRESSION: 0

## 2018-08-06 NOTE — PROGRESS NOTES
Progress  Note  Current Facility-Administered Medications   Medication Dose Route Frequency Provider Last Rate Last Dose    divalproex (DEPAKOTE) DR tablet 125 mg  125 mg Oral TID Burke Leyva MD        cefTRIAXone (ROCEPHIN) 2 g in sterile water 20 mL IV syringe  2 g Intravenous Q24H Deniz Hobson MD   2 g at 08/06/18 1238    miconazole (MICOTIN) 2 % cream   Topical BID Torie Elizabeth MD        mineral oil-hydrophilic petrolatum (HYDROPHOR) ointment   Topical BID PRN Torie Elizabeth MD        QUEtiapine (SEROQUEL) tablet 25 mg  25 mg Oral Nightly Torie Elizabeth MD   25 mg at 08/05/18 2247    sodium chloride flush 0.9 % injection 10 mL  10 mL Intravenous 2 times per day Rui Bill MD   10 mL at 08/04/18 1045    sodium chloride flush 0.9 % injection 10 mL  10 mL Intravenous PRN Rui Bill MD        acetaminophen (TYLENOL) tablet 650 mg  650 mg Oral Q4H PRN Roxana Cook MD   650 mg at 08/05/18 0422    vitamin D (CHOLECALCIFEROL) tablet 1,000 Units  1,000 Units Oral Daily Roxana Cook MD   1,000 Units at 08/05/18 6713    levothyroxine (SYNTHROID) tablet 100 mcg  100 mcg Oral Daily Roxana Cook MD   100 mcg at 08/06/18 0654    melatonin tablet 6 mg  6 mg Oral Nightly Roxana Cook MD   6 mg at 08/05/18 2203    simvastatin (ZOCOR) tablet 40 mg  40 mg Oral Nightly Roxana Cook MD   40 mg at 08/05/18 2203    sodium chloride flush 0.9 % injection 10 mL  10 mL Intravenous 2 times per day Roxana Cook MD   10 mL at 08/05/18 0906    sodium chloride flush 0.9 % injection 10 mL  10 mL Intravenous PRN Roxana Cook MD   10 mL at 08/05/18 0007    magnesium hydroxide (MILK OF MAGNESIA) 400 MG/5ML suspension 30 mL  30 mL Oral Daily PRN Roxana Cook MD        ondansetron [facility]) injection 4 mg  4 mg Intravenous Q6H PRN Roxana Cook MD         Review of Systems   Unable to perform ROS: Dementia       Vitals:    08/06/18 1200   BP: (!) 131/92   Pulse: 96   Resp: 19   Temp: 97.4 °F (36.3

## 2018-08-06 NOTE — PROGRESS NOTES
BPH  - Urology following    <Hematology/Oncology>  Acute normocytic anemia, stable  - Hemodilution vs due to gross hematuria  - Transfusion if Hb < 7 g/dL   - Monitor CBC daily    # Peptic ulcer prophylaxis: diet  # DVT Prophylaxis: PCDs  # Disposition: Transfer to telemetry     Benita Flores M.D., PGY-2  Internal medicine resident  Attending Physician: Dr. Harshil Craft  Addendum ICU Attending Statement     Shy Sanders was seen, examined and discussed with the multi-disciplinary ICU team during rounds. A addendum note may be separate to the residents note. If not then, I have personally seen and examined the patient and the key elements of the encounter were performed by me (> 85 % time). The medications & laboratory data was discussed and adjusted where necessary. The radiographic images were reviewed either as a group or with radiologist.  Any changes are document or changed if felt dis-concordant with the exam or history. The above findings were corroborated, plans confirmed and changes made if needed. Family was updated at the bedside as available. Key issues of the case were discussed among consultants. Critical Care time is documented if appropriate.       Renetta Hastings DO, MPH  Professor of Medicine

## 2018-08-06 NOTE — PROGRESS NOTES
[facility] PROGRESS NOTE                                                                       8/6/2018        CHIEF UROLOGIC COMPLAINT: NGB, BPH, UTI, Hematuria, Hx of bladder cancer     HISTORY OF PRESENT ILLNESS:  Patient without new complaints. Transferred for afib with RVR. Urine clearing. No clots. Denies catheter pain. E.coli in cultures.      REVIEW OF SYSTEMS:   CONSTITUTIONAL: negative  HEENT: negative  HEMATOLOGIC: negative  ENDOCRINE: negative  RESPIRATORY: negative  CV: negative  GI: negative  NEURO: negative  ORTHOPEDICS: negative  PSYCHIATRIC: negative  : as above    PAST FAMILY HISTORY:    Family History   Problem Relation Age of Onset    Cancer Sister         lung    Cancer Brother      PAST SOCIAL HISTORY:    Social History     Social History    Marital status:      Spouse name: N/A    Number of children: N/A    Years of education: N/A     Social History Main Topics    Smoking status: Former Smoker    Smokeless tobacco: Never Used    Alcohol use No    Drug use: No    Sexual activity: Not Asked     Other Topics Concern    None     Social History Narrative    None       Scheduled Meds:   piperacillin-tazobactam  3.375 g Intravenous Q8H    miconazole   Topical BID    QUEtiapine  25 mg Oral Nightly    sodium chloride flush  10 mL Intravenous 2 times per day    vitamin D  1,000 Units Oral Daily    levothyroxine  100 mcg Oral Daily    melatonin  6 mg Oral Nightly    simvastatin  40 mg Oral Nightly    sodium chloride flush  10 mL Intravenous 2 times per day     Continuous Infusions:   norepinephrine 4 mcg/min (08/05/18 1501)     PRN Meds:.mineral oil-hydrophilic petrolatum, sodium chloride flush, acetaminophen, sodium chloride flush, magnesium hydroxide, ondansetron    BP (!) 145/96   Pulse 101   Temp 97.6 °F (36.4 °C) (Temporal)   Resp 15   Ht 6'

## 2018-08-06 NOTE — PROGRESS NOTES
Clinical Documentation     7/5/2018  [facility]   Minor Allison, [facility]   Hip fracture [facility]) +3 more   Dx    Progress Notes     Expand All Collapse All    SUBJECTIVE  Kristi Ponce is a 80 y.o. male.     HPI/Chief C/O:  No chief complaint on file.     No Known Allergies  There are no new issues today  I will do a complete med list review and reconcile his   Plan of care        ROS:  Review of Systems   Constitutional: Positive for malaise/fatigue. Negative for chills, diaphoresis, fever and weight loss. HENT: Negative. Negative for congestion, ear discharge, ear pain, hearing loss, nosebleeds, sinus pain, sore throat and tinnitus. Eyes: Negative. Negative for blurred vision, double vision, photophobia, pain, discharge and redness. Respiratory: Negative. Negative for cough, hemoptysis, sputum production, shortness of breath, wheezing and stridor. Cardiovascular: Negative. Negative for chest pain, palpitations, orthopnea, claudication, leg swelling and PND. Gastrointestinal: Negative. Negative for abdominal pain, blood in stool, constipation, diarrhea, heartburn, melena, nausea and vomiting. Genitourinary: Negative for dysuria, flank pain, frequency, hematuria and urgency. Indwelling Spann    Musculoskeletal: Positive for back pain, falls, joint pain, myalgias and neck pain. Skin: Negative. Negative for itching and rash. Neurological: Positive for weakness. Negative for dizziness, tingling, tremors, sensory change, speech change, focal weakness, seizures, loss of consciousness and headaches. Endo/Heme/Allergies: Negative. Negative for environmental allergies and polydipsia. Does not bruise/bleed easily. Psychiatric/Behavioral: Positive for depression. Negative for hallucinations, memory loss, substance abuse and suicidal ideas. The patient is nervous/anxious.  The patient does not have insomnia.       Past Medical/Surgical Pulmonary/Chest: Effort normal and breath sounds normal. No stridor. No respiratory distress. He has no wheezes. He has no rales. He exhibits no tenderness. Abdominal: Soft. Bowel sounds are normal. He exhibits no distension and no mass. There is no tenderness. There is no rebound and no guarding. No hernia. Genitourinary:   Genitourinary Comments: Spann CATH is draining well   Musculoskeletal: Normal range of motion. He exhibits tenderness. He exhibits no edema or deformity. Bilateral severe knee pain  Left hip replacement   Diffuse and multiple joint pain of osteoarthritis    Lymphadenopathy:     He has no cervical adenopathy. Neurological: He is alert and oriented to person, place, and time. He has normal reflexes. He displays normal reflexes. A sensory deficit is present. No cranial nerve deficit. He exhibits normal muscle tone. Coordination normal.   Skin: Skin is warm. No rash noted. He is not diaphoretic. No erythema. No pallor. Nursing note and vitals reviewed.     ASSESSMENT/PLAN  Diagnoses and all orders for this visit:     Hip fracture ([facility] [address]     Atrial fibrillation with RVR ([facility])  - VASCULAR PANEL  A) asa, plavix, aggrenox  B) COUMADIN, pletal, tzd, STATIN  C) ace, LASIX, folic, ccb  D) cannikinumab, fish oils         Chronic pain of left knee     Essential hypertension  --CARDIAC--COUMADIN, BETA, STATIN, ace, LASIX, ( ccb )      he is stable and I will make no change to his care plan     Encounter Medications          Outpatient Encounter Prescriptions as of 7/5/2018   Medication Sig Dispense Refill    HYDROcodone-acetaminophen (NORCO) 5-325 MG per tablet Take 1 tablet by mouth every 6 hours as needed for Pain for up to 7 days. Lovetta Blaze  5 tablet 0    nitrofurantoin, macrocrystal-monohydrate, (MACROBID) 100 MG capsule Take 1 capsule by mouth 2 times daily for 5 days 10 capsule 0    glipiZIDE (GLUCOTROL XL) 5 MG extended release tablet Take 5 mg by mouth daily        warfarin (COUMADIN) 3 MG tablet knee pain [M25.562]   6/29/2013 - Present   Atrial fibrillation with RVR ([address] [I48.91]   6/29/2013 - Present   RF (renal failure) [N19]   6/29/2013 - Present   Leukocytosis [D72.829]   6/29/2013 - Present   Essential hypertension [I10]   6/29/2013 - Present   Overview Signed 3/23/2018  2:31 PM by Cornelia Pierce, DO   Overview:   Essential hypertension   HLD (hyperlipidemia) [E78.5]   6/29/2013 - Present   History of gout [Z87.39]   6/29/2013 - Present   Hypothyroidism [E03.9]   6/29/2013 - Present   Overview Signed 3/23/2018  2:31 PM by Cornelia Pierce, DO   Overview:   Hypothyroidism   GERD (gastroesophageal reflux disease) [K21.9]   6/29/2013 - Present   Hip fracture ([address] [S72.002A]   3/21/2018 - Present   Coronary atherosclerosis [I25.10]   3/23/2018 - Present   Overview Signed 3/23/2018  2:31 PM by Cornelia Pierce, DO   Overview:   Coronary artery disease   Occlusion and stenosis of carotid artery with cerebral infarction [I63.239]   3/23/2018 - Present   Overview Signed 3/23/2018  2:31 PM by Cornelia Pierce, DO   Overview:   20-39% per study today   Pacemaker [Z95.0]   3/27/2018 - Present   Hematuria [R31.9]   6/29/2018 - Present   Severe protein-calorie malnutrition ([address] [E43]   6/30/2018 - Present   Sepsis ([address] [A41.9]   8/4/2018 - Present   Orders Placed      None   Medication Changes        None      Medication List   Visit Diagnoses         Hip fracture ([address]      Atrial fibrillation with RVR ([address]      Chronic pain of left knee      Essential hypertension      Problem List

## 2018-08-06 NOTE — PROGRESS NOTES
Patient's daughter Haze Pallas was called and notified of patients transfer to [facility].

## 2018-08-06 NOTE — PROGRESS NOTES
[facility] [place]  Internal Medicine Department  Division of Pulmonary, Critical Care and Sleep Medicine  Daily Intensive Care Unit Progress  Note      Admit Date: 8/4/2018    MRN: [id number]        Patient:  Fay Gilman 80 y.o. male     PCP: Samantha Pereira MD    Date of Service: 8/6/2018      Allergy: Patient has no known allergies. Subjective   The events of the past 24 hours are in the residents notes. No SOB or CP    Physical Exam:     VITALS:  /82   Pulse 118   Temp 98.2 °F (36.8 °C) (Temporal)   Resp 27   Ht 6' 1\" (1.854 m)   Wt 165 lb (74.8 kg)   SpO2 97%   BMI 21.77 kg/m²   24HR INTAKE/OUTPUT:    Intake/Output Summary (Last 24 hours) at 08/06/18 1741  Last data filed at 08/06/18 1700   Gross per 24 hour   Intake              250 ml   Output             1535 ml   Net            -1285 ml     General: Alert  HEENT: Conjunctiva/corneas clear, No icterus. No exudates. Neck: Supple, No JVD  Chest wall: Symmetric excursion  Lung: Course to auscultation No rales or wheezing  Heart: Regular rate and rhythm, No murmur, rub or  gallop. Abdomen: BS +, soft, no rigidity, rebound or guarding  Extremities: Trace edema. Palp pulses. Skin: No rashes  Musculokeletal: No trauma signs   Lymph nodes: No adenopathy  Neurologic: Non focal examination    Medications   Home and Current medications were discussed & reviewed on rounds with team and pharmacy liaison. Labs &  Imaging Studies   The data collected below information that was obtained, reviewed, analyzed and interpreted today. Imaging test are reviewed with the radiologist during rounds. Comparison to previous images are always explored.      CBC:   Lab Results   Component Value Date    WBC 28.9 08/06/2018    RBC 3.31 08/06/2018    HGB 9.8 08/06/2018    HCT 29.2 08/06/2018     08/06/2018    MCV 84.0 08/06/2018       BMP:    Lab Results   Component Value Date     08/06/2018    K 4.1 08/06/2018     08/06/2018    CO2 17 08/06/2018    BUN 38 08/06/2018    CREATININE 1.4 08/06/2018    GLUCOSE 88 08/06/2018    CALCIUM 8.0 08/06/2018       TSH:    Lab Results   Component Value Date    TSH 3.830 02/20/2018         Imaging Studies:  RADIOLOGY: Films were read/reviewed and or discussed with radiology and show no infiltrate    EKG: ICU Rhythm Strips were reviewed and discussed. Sinus     Assessment    Kim Gilmore is a 80 y.o. male was seen, examined and discussed with the multi-disciplinary ICU team during rounds. This progress note may be separate or in addition to the residents record. I have personally seen and examined the patient and the key elements of the encounter were performed by me. The medications & laboratory data was discussed and adjusted where necessary. The radiographic images were reviewed either as a group or with radiologist if felt dis-concordant with the exam or history. The above findings were corroborated, plans confirmed and changes made if needed. Current issues are :  1. Septic shock improved  2. UTI  3. Chronic redman  4. Dementia  5. Chronic anemia  6. Lactic acidosis            Plan   Family is updated at the bedside as available. Key issues of the case were discussed among consultants. Critical Care time is documented if appropriate. 1. ID and urology noted reviewed  2. Stop fluids  3. Antibiotics adjusted  4. Advance diet  5. Repeat BC x 2  6.  Monitor David Skinner  Professor of Medicine

## 2018-08-06 NOTE — PROGRESS NOTES
mastication due to ([]  poor/missing dentition  []  decreased lingual control  []  vertical munch  []  cognitive function)     []  Delayed A-P transit due to ([]  decreased initiation   [] reduced lingual strength   []  cognitive function )    []  Oral residuals []  right buccal cavity  []  left buccal cavity []  sub lingually   []  on tongue base   []  throughout oral cavity     []  on superior tongue       []  on palate               []  on velum              []  anterior sulcus    Comments:      Pharyngeal Stage:  []  Normal   []  Functional      [x]  Abnormal     []  No signs of aspiration were noted during this evaluation however, silent aspiration cannot be ruled out at bedside. If silent aspiration is suspected, a Videofluoroscopic Study of Swallowing (MBS) is recommended and requires a physician order.    []  Throat clearing present after presentation of ([]  thin  []  nectar  []  honey  []  pureed  []  solid)    []  Immediate wet cough was noted after presentation of ([]  thin  []  nectar  []  honey  []  pureed  []  Solid)    [x]  Latent wet cough was noted after presentation of ([x]  thin  [x]  nectar  [x]  honey  []  pureed  []  solid)    [x]  Wet respirations were noted after presentation of ([x]  thin  [x]  nectar  [x]  honey  []  pureed  []  solid)    [x]  Wet/gurgly vocal quality was noted after presentation of ([x]  thin  [x]  nectar  [x]  honey  []  pureed  []  Solid)    [x] Multiple swallows were noted after presentation of ([]  thin  []  nectar  []  honey  [x]  pureed  [] solid)    []  Consistent O2  desaturation after the swallow    []  Eye watering/flushing of skin    []  Congested cough throughout evaluation    [] Delayed initiation of the pharyngeal swallow noted    []  Absent swallow                    []  Prognosis for improvements is   []  This plan will be re-evaluated and revised in 1 week  if warranted.    []  Patient stated goals:   []  Treatment goals discussed with []  patient/  [] family. []  The []  patient/ []  family understand the diagnosis, prognosis and plan of care. [x]The admitting diagnosis and active problem list, as listed below have been reviewed prior to initiation of this evaluation.      ADMITTING DIAGNOSIS: Sepsis ([facility] [address] [A41.9]  Sepsis ([facility] [address] [A41.9]     ACTIVE PROBLEM LIST:   Patient Active Problem List   Diagnosis    Inability to ambulate due to knee    Left knee pain    Atrial fibrillation with RVR (HCC)    RF (renal failure)    Leukocytosis    Essential hypertension    HLD (hyperlipidemia)    History of gout    Hypothyroidism    GERD (gastroesophageal reflux disease)    Hip fracture (HCC)    Coronary atherosclerosis    Occlusion and stenosis of carotid artery with cerebral infarction    Pacemaker    Hematuria    Severe protein-calorie malnutrition ([address]    Sepsis ([address]

## 2018-08-06 NOTE — PROGRESS NOTES
2x  - on Depakote 125 mg/tab TID, Seroquel 25 mg/tab OD  - monitor mentation     #Hx of sick sinus rhythm  - s/p pacemaker insertion 03/2018    #HTN  - BP meds on hold due to episodes of hypotension    #DM type 2  - hgb 6.9 (7/18)  - lastest BS -88  - oral DM meds on hold  - continue to monitor BS levels  - diet PO started    #Hypothyroidism  - TSH 3.8 (2/18)  - continue synthroid 100 mcg daily    # BPH  - hx of urinary retention on chronic urethral catheterization  - finasteride and tamsulosin on hold    # hx of left hip fracture s/p ORIF 03/2018  - no current complains in the area      PT/OT evaluation: ordered  DVT prophylaxis/ GI prophylaxis: coumadin on hold  Disposition: may transfer to lower level of care    Gabrielle Gonzalez MD, PGY-1  Attending physician: Dr. Dimitry Puri

## 2018-08-06 NOTE — PLAN OF CARE
Problem: Falls - Risk of:  Goal: Will remain free from falls  Will remain free from falls   Outcome: Met This Shift    Goal: Absence of physical injury  Absence of physical injury   Outcome: Met This Shift      Problem: Risk for Impaired Skin Integrity  Goal: Tissue integrity - skin and mucous membranes  Structural intactness and normal physiological function of skin and  mucous membranes.    Outcome: Met This Shift      Problem: Discharge Planning:  Goal: Discharged to appropriate level of care  Discharged to appropriate level of care   Outcome: Not Met This Shift      Problem: Infection, Septic Shock:  Goal: Will show no infection signs and symptoms  Will show no infection signs and symptoms   Outcome: Not Met This Shift      Problem: Tissue Perfusion, Altered:  Goal: Circulatory function within specified parameters  Circulatory function within specified parameters   Outcome: Met This Shift      Problem: Nutrition  Goal: Optimal nutrition therapy  Outcome: Not Met This Shift

## 2018-08-07 PROBLEM — R78.81 E COLI BACTEREMIA: Status: ACTIVE | Noted: 2018-08-07

## 2018-08-07 PROBLEM — B96.20 E COLI BACTEREMIA: Status: ACTIVE | Noted: 2018-08-07

## 2018-08-07 PROBLEM — E43 SEVERE PROTEIN-ENERGY MALNUTRITION (HCC): Status: ACTIVE | Noted: 2018-08-07

## 2018-08-07 LAB
ALBUMIN SERPL-MCNC: 2.5 G/DL (ref 3.5–5.2)
ALP BLD-CCNC: 78 U/L (ref 40–129)
ALT SERPL-CCNC: 12 U/L (ref 0–40)
ANION GAP SERPL CALCULATED.3IONS-SCNC: 15 MMOL/L (ref 7–16)
AST SERPL-CCNC: 24 U/L (ref 0–39)
BASOPHILS ABSOLUTE: 0 E9/L (ref 0–0.2)
BASOPHILS RELATIVE PERCENT: 0.3 % (ref 0–2)
BILIRUB SERPL-MCNC: 0.3 MG/DL (ref 0–1.2)
BILIRUBIN DIRECT: <0.2 MG/DL (ref 0–0.3)
BILIRUBIN, INDIRECT: ABNORMAL MG/DL (ref 0–1)
BLOOD CULTURE, ROUTINE: ABNORMAL
BUN BLDV-MCNC: 36 MG/DL (ref 8–23)
CALCIUM SERPL-MCNC: 8.4 MG/DL (ref 8.6–10.2)
CHLORIDE BLD-SCNC: 108 MMOL/L (ref 98–107)
CO2: 18 MMOL/L (ref 22–29)
CREAT SERPL-MCNC: 1.3 MG/DL (ref 0.7–1.2)
CULTURE, BLOOD 2: ABNORMAL
CULTURE, BLOOD 2: ABNORMAL
DIGOXIN LEVEL: 0.4 NG/ML (ref 0.8–2)
EKG ATRIAL RATE: 71 BPM
EKG ATRIAL RATE: 96 BPM
EKG Q-T INTERVAL: 282 MS
EKG Q-T INTERVAL: 360 MS
EKG QRS DURATION: 80 MS
EKG QRS DURATION: 82 MS
EKG QTC CALCULATION (BAZETT): 424 MS
EKG QTC CALCULATION (BAZETT): 459 MS
EKG R AXIS: 35 DEGREES
EKG R AXIS: 44 DEGREES
EKG T AXIS: 12 DEGREES
EKG T AXIS: 18 DEGREES
EKG VENTRICULAR RATE: 136 BPM
EKG VENTRICULAR RATE: 98 BPM
EOSINOPHILS ABSOLUTE: 0.35 E9/L (ref 0.05–0.5)
EOSINOPHILS RELATIVE PERCENT: 2.6 % (ref 0–6)
GFR AFRICAN AMERICAN: >60
GFR NON-AFRICAN AMERICAN: 52 ML/MIN/1.73
GLUCOSE BLD-MCNC: 97 MG/DL (ref 74–109)
HCT VFR BLD CALC: 30.1 % (ref 37–54)
HEMOGLOBIN: 9.7 G/DL (ref 12.5–16.5)
INR BLD: 2.2
LV EF: 60 %
LVEF MODALITY: NORMAL
LYMPHOCYTES ABSOLUTE: 0.54 E9/L (ref 1.5–4)
LYMPHOCYTES RELATIVE PERCENT: 4.4 % (ref 20–42)
MAGNESIUM: 2.2 MG/DL (ref 1.6–2.6)
MCH RBC QN AUTO: 26.8 PG (ref 26–35)
MCHC RBC AUTO-ENTMCNC: 32.2 % (ref 32–34.5)
MCV RBC AUTO: 83.1 FL (ref 80–99.9)
METER GLUCOSE: 100 MG/DL (ref 70–110)
METER GLUCOSE: 113 MG/DL (ref 70–110)
METER GLUCOSE: 98 MG/DL (ref 70–110)
MONOCYTES ABSOLUTE: 0.14 E9/L (ref 0.1–0.95)
MONOCYTES RELATIVE PERCENT: 0.9 % (ref 2–12)
MYELOCYTE PERCENT: 0.9 % (ref 0–0)
NEUTROPHILS ABSOLUTE: 12.42 E9/L (ref 1.8–7.3)
NEUTROPHILS RELATIVE PERCENT: 91.2 % (ref 43–80)
NUCLEATED RED BLOOD CELLS: 0.9 /100 WBC
ORGANISM: ABNORMAL
ORGANISM: ABNORMAL
OVALOCYTES: ABNORMAL
PDW BLD-RTO: 18.5 FL (ref 11.5–15)
PHOSPHORUS: 2.5 MG/DL (ref 2.5–4.5)
PLATELET # BLD: 98 E9/L (ref 130–450)
PLATELET CONFIRMATION: NORMAL
PMV BLD AUTO: 12.5 FL (ref 7–12)
POIKILOCYTES: ABNORMAL
POLYCHROMASIA: ABNORMAL
POTASSIUM REFLEX MAGNESIUM: 4 MMOL/L (ref 3.5–5)
PROTHROMBIN TIME: 24.8 SEC (ref 9.3–12.4)
RBC # BLD: 3.62 E12/L (ref 3.8–5.8)
SODIUM BLD-SCNC: 141 MMOL/L (ref 132–146)
TOTAL PROTEIN: 5.6 G/DL (ref 6.4–8.3)
TSH SERPL DL<=0.05 MIU/L-ACNC: 9.17 UIU/ML (ref 0.27–4.2)
WBC # BLD: 13.5 E9/L (ref 4.5–11.5)

## 2018-08-07 PROCEDURE — 97535 SELF CARE MNGMENT TRAINING: CPT

## 2018-08-07 PROCEDURE — 82962 GLUCOSE BLOOD TEST: CPT

## 2018-08-07 PROCEDURE — 99233 SBSQ HOSP IP/OBS HIGH 50: CPT | Performed by: INTERNAL MEDICINE

## 2018-08-07 PROCEDURE — 99223 1ST HOSP IP/OBS HIGH 75: CPT | Performed by: CLINICAL NURSE SPECIALIST

## 2018-08-07 PROCEDURE — 80076 HEPATIC FUNCTION PANEL: CPT

## 2018-08-07 PROCEDURE — 2580000003 HC RX 258: Performed by: EMERGENCY MEDICINE

## 2018-08-07 PROCEDURE — 6370000000 HC RX 637 (ALT 250 FOR IP): Performed by: INTERNAL MEDICINE

## 2018-08-07 PROCEDURE — 84100 ASSAY OF PHOSPHORUS: CPT

## 2018-08-07 PROCEDURE — 6360000002 HC RX W HCPCS: Performed by: INTERNAL MEDICINE

## 2018-08-07 PROCEDURE — 80048 BASIC METABOLIC PNL TOTAL CA: CPT

## 2018-08-07 PROCEDURE — 93010 ELECTROCARDIOGRAM REPORT: CPT | Performed by: INTERNAL MEDICINE

## 2018-08-07 PROCEDURE — 2580000003 HC RX 258: Performed by: INTERNAL MEDICINE

## 2018-08-07 PROCEDURE — 85610 PROTHROMBIN TIME: CPT

## 2018-08-07 PROCEDURE — 93005 ELECTROCARDIOGRAM TRACING: CPT | Performed by: INTERNAL MEDICINE

## 2018-08-07 PROCEDURE — 84443 ASSAY THYROID STIM HORMONE: CPT

## 2018-08-07 PROCEDURE — 80162 ASSAY OF DIGOXIN TOTAL: CPT

## 2018-08-07 PROCEDURE — 93306 TTE W/DOPPLER COMPLETE: CPT

## 2018-08-07 PROCEDURE — 83735 ASSAY OF MAGNESIUM: CPT

## 2018-08-07 PROCEDURE — 2140000000 HC CCU INTERMEDIATE R&B

## 2018-08-07 PROCEDURE — G8988 SELF CARE GOAL STATUS: HCPCS

## 2018-08-07 PROCEDURE — 97165 OT EVAL LOW COMPLEX 30 MIN: CPT

## 2018-08-07 PROCEDURE — 85025 COMPLETE CBC W/AUTO DIFF WBC: CPT

## 2018-08-07 PROCEDURE — 36415 COLL VENOUS BLD VENIPUNCTURE: CPT

## 2018-08-07 PROCEDURE — 84630 ASSAY OF ZINC: CPT

## 2018-08-07 PROCEDURE — G8987 SELF CARE CURRENT STATUS: HCPCS

## 2018-08-07 RX ORDER — SODIUM CHLORIDE, SODIUM LACTATE, POTASSIUM CHLORIDE, CALCIUM CHLORIDE 600; 310; 30; 20 MG/100ML; MG/100ML; MG/100ML; MG/100ML
INJECTION, SOLUTION INTRAVENOUS CONTINUOUS
Status: ACTIVE | OUTPATIENT
Start: 2018-08-07 | End: 2018-08-07

## 2018-08-07 RX ORDER — PETROLATUM 42 G/100G
OINTMENT TOPICAL 2 TIMES DAILY PRN
Status: DISCONTINUED | OUTPATIENT
Start: 2018-08-07 | End: 2018-08-07 | Stop reason: SDUPTHER

## 2018-08-07 RX ORDER — CHLORHEXIDINE GLUCONATE 0.12 MG/ML
15 RINSE ORAL 2 TIMES DAILY
Status: DISCONTINUED | OUTPATIENT
Start: 2018-08-07 | End: 2018-08-09 | Stop reason: HOSPADM

## 2018-08-07 RX ORDER — SODIUM CHLORIDE, SODIUM LACTATE, POTASSIUM CHLORIDE, CALCIUM CHLORIDE 600; 310; 30; 20 MG/100ML; MG/100ML; MG/100ML; MG/100ML
INJECTION, SOLUTION INTRAVENOUS CONTINUOUS
Status: DISCONTINUED | OUTPATIENT
Start: 2018-08-07 | End: 2018-08-07

## 2018-08-07 RX ADMIN — NYSTATIN 500000 UNITS: 100000 SUSPENSION ORAL at 22:52

## 2018-08-07 RX ADMIN — Medication 10 ML: at 22:55

## 2018-08-07 RX ADMIN — SODIUM CHLORIDE, POTASSIUM CHLORIDE, SODIUM LACTATE AND CALCIUM CHLORIDE: 600; 310; 30; 20 INJECTION, SOLUTION INTRAVENOUS at 14:45

## 2018-08-07 RX ADMIN — CHLORHEXIDINE GLUCONATE 0.12% ORAL RINSE 15 ML: 1.2 LIQUID ORAL at 14:50

## 2018-08-07 RX ADMIN — CEFTRIAXONE 2 G: 2 INJECTION, POWDER, FOR SOLUTION INTRAMUSCULAR; INTRAVENOUS at 11:50

## 2018-08-07 RX ADMIN — LEVOTHYROXINE SODIUM 100 MCG: 100 TABLET ORAL at 06:43

## 2018-08-07 RX ADMIN — NYSTATIN 500000 UNITS: 100000 SUSPENSION ORAL at 14:46

## 2018-08-07 RX ADMIN — CHLORHEXIDINE GLUCONATE 0.12% ORAL RINSE 15 ML: 1.2 LIQUID ORAL at 22:52

## 2018-08-07 RX ADMIN — DIVALPROEX SODIUM 125 MG: 125 TABLET, DELAYED RELEASE ORAL at 10:11

## 2018-08-07 RX ADMIN — VITAMIN D, TAB 1000IU (100/BT) 1000 UNITS: 25 TAB at 10:11

## 2018-08-07 RX ADMIN — MICONAZOLE NITRATE: 20 CREAM TOPICAL at 22:54

## 2018-08-07 RX ADMIN — SIMVASTATIN 40 MG: 40 TABLET, FILM COATED ORAL at 22:52

## 2018-08-07 RX ADMIN — METOPROLOL TARTRATE 25 MG: 25 TABLET, FILM COATED ORAL at 22:52

## 2018-08-07 RX ADMIN — DIVALPROEX SODIUM 125 MG: 125 TABLET, DELAYED RELEASE ORAL at 22:53

## 2018-08-07 RX ADMIN — MICONAZOLE NITRATE: 20 CREAM TOPICAL at 10:18

## 2018-08-07 RX ADMIN — MELATONIN 3 MG ORAL TABLET 6 MG: 3 TABLET ORAL at 22:52

## 2018-08-07 ASSESSMENT — PAIN SCALES - GENERAL
PAINLEVEL_OUTOF10: 0
PAINLEVEL_OUTOF10: 0

## 2018-08-07 NOTE — PROGRESS NOTES
[facility]  Internal Medicine Residency Program  Progress Note - House Team 2    Patient:  Mata Borja 80 y.o. male MRN: [id number]     Date of Service: 8/7/2018     CC: altered mental status and hematuria  Overnight events:   Patient transferred out of MICU. Had an episode of Afib in RVR and was then controlled with digoxin 1 dose. Urine output clear today. Subjective     Patient was seen and examined this morning. Patient is somnolent, opens eyes to command, less cooperative and conversant, oriented x3. Objective     Physical Exam:  · Vitals: /60   Pulse 100   Temp 98.9 °F (37.2 °C) (Oral)   Resp 20   Ht 6' 1\" (1.854 m)   Wt 168 lb 9.6 oz (76.5 kg)   SpO2 95%   BMI 22.24 kg/m²     · I & O - 24hr: No intake/output data recorded. · General Appearance: somnolent, appears stated age, follows command  · HEENT:  Head: Normocephalic, no lesions, without obvious abnormality. · Neck: no adenopathy, no carotid bruit, no JVD, supple, symmetrical, trachea midline and thyroid not enlarged, symmetric, no tenderness/mass/nodules  · Lung: decrease breath sounds right lung base, (+) fine crackle left lung base  · Heart: tachycardic 110-120 bpm and  Regular rhythm, S1, S2 normal, no murmur, click, rub or gallop  · Abdomen: soft, non-tender; bowel sounds normal; no masses,  no organomegaly, (+) palpable mass RLQ  · Extremities:  extremities normal, atraumatic, no cyanosis or edema  · Musculokeletal: No joint swelling, no muscle tenderness.  ROM normal in all joints of extremities, (+) muscle wasting  · Neurologic: Mental status: Oriented x3, less cooperative and conversant, aggressive  Subject  Pertinent Labs & Imaging Studies   yessi  CBC with Differential:    Lab Results   Component Value Date    WBC 13.5 08/07/2018    RBC 3.62 08/07/2018    HGB 9.7 08/07/2018    HCT 30.1 08/07/2018    PLT 98 08/07/2018    MCV 83.1 08/07/2018    MCH 26.8 08/07/2018    MCHC 32.2 08/07/2018    RDW 18.5 08/07/2018    NRBC 0.9 08/07/2018    SEGSPCT 65 10/17/2013    METASPCT 7.9 08/05/2018    LYMPHOPCT 4.4 08/07/2018    MONOPCT 0.9 08/07/2018    MYELOPCT 0.9 08/07/2018    BASOPCT 0.3 08/07/2018    MONOSABS 0.14 08/07/2018    LYMPHSABS 0.54 08/07/2018    EOSABS 0.35 08/07/2018    BASOSABS 0.00 08/07/2018     CMP:    Lab Results   Component Value Date     08/07/2018    K 4.0 08/07/2018     08/07/2018    CO2 18 08/07/2018    BUN 36 08/07/2018    CREATININE 1.3 08/07/2018    GFRAA >60 08/07/2018    LABGLOM 52 08/07/2018    GLUCOSE 97 08/07/2018    PROT 5.6 08/07/2018    LABALBU 2.5 08/07/2018    CALCIUM 8.4 08/07/2018    BILITOT 0.3 08/07/2018    ALKPHOS 78 08/07/2018    AST 24 08/07/2018    ALT 12 08/07/2018     PT/INR:    Lab Results   Component Value Date    PROTIME 24.8 08/07/2018    INR 2.2 08/07/2018     Warfarin PT/INR:  No components found for: March Connie  Last 3 Troponin:    Lab Results   Component Value Date    TROPONINI 0.12 08/06/2018    TROPONINI 0.13 08/05/2018    TROPONINI 0.02 06/14/2018     U/A:    Lab Results   Component Value Date    COLORU RED 08/04/2018    PROTEINU 100 08/04/2018    PHUR 7.0 08/04/2018    WBCUA >20 08/04/2018    RBCUA PACKED 08/04/2018    RBCUA NONE 03/11/2014    BACTERIA MANY 08/04/2018    CLARITYU CLOUDY 08/04/2018    SPECGRAV 1.015 08/04/2018    LEUKOCYTESUR LARGE 08/04/2018    UROBILINOGEN 1.0 08/04/2018    BILIRUBINUR SMALL 08/04/2018    BLOODU LARGE 08/04/2018    GLUCOSEU Negative 08/04/2018     HgBA1c:    Lab Results   Component Value Date    LABA1C 6.9 07/03/2018     TSH:    Lab Results   Component Value Date    TSH 9.170 08/07/2018     XR CHEST PORTABLE   Final Result   No evidence of pneumothorax following attempted central line   placement. Mild pulmonary edema is suspected in this patient with cardiomegaly   and a left-sided unipolar pacemaker.       US NONVASCULAR TRUNK SCAN   Final Result   The palpable finding in the right lower quadrant is Latest INR 2.3, PT 26.7   - Uro following    #CKD stage III, stable,    - baseline crea 1.3  - latest crea at baseline  - continue to monitor UO and creatinine levels      # Anemia, normocytic, improving  - 2/2 to acute blood loss ( hematuria)  - hgb trending up now 9.7  - continue to monitor H and       #Dementia  - somnolent, oriented 3x, aggressive, does not follow commands  - on Depakote 125 mg/tab TID,   - Seroquel 25 mg/tab OD discontinued  - monitor mentation    # Protein Calorie malnutrition  - 2/2  Poor oral intake  - serum JENNIFER , albumin  - (+) muscle wasting  - for zinc level  - for protein replacement     #Hx of sick sinus rhythm  - s/p pacemaker insertion 03/2018    #HTN  -started on lopressor 25 mg BID for Afib rate control  - monitor BP    #DM type 2  - hgb 6.9 (7/18)  - lastest BS -97  - oral DM meds on hold  - continue to monitor BS levels    #Hypothyroidism  - TSH 9.17 (2/18)  - continue synthroid 100 mcg daily  - for repeat TSH    # BPH  - hx of urinary retention on chronic urethral catheterization  - finasteride and tamsulosin on hold    # Tinea Pedis  - started on miconazole cream    # hx of left hip fracture s/p ORIF 03/2018  - no current complains in the area      PT/OT evaluation: ordered  DVT prophylaxis/ GI prophylaxis: coumadin on hold  Disposition: may transfer to lower level of care    Amaya Canela MD, PGY-1  Attending physician: Dr. Lisa Oliveira

## 2018-08-07 NOTE — CONSULTS
male with significant past medical history of A fib, bladder cancer, CAD, CKD III, dementia, depression, DM, hip fracture s/p fall 3/018 with ORIF, HTN, HLD, SSS s/p pacemaker, prostate cancer, thyroid disease and urinary retention requiring chronic redman catheter who presented with AMS and hematuria. Per family at baseline the patient is oriented to self and recognizes family. Found upon admission to have urosepsis with Marthann Soda. Started on antibiotics, IV fluids and IV Cardizem. He experienced persistent hematuria with decreased H/H, hypotension, leukocytosis and RENUKA requiring transfer to MICU for septic shock. Sepsis improved and hematuria resolved with patient transferring out of ICU. He continues to have episodes of A. fib/RVR with heart rate increased to 130s. He is alert and oriented to self only; which appears to be his baseline. He is continued on antibiotics for urosepsis. Coumadin has been held secondary to hematuria. WBC trending down with H/H stable. Serum creatinine close to patient's baseline at 1.3. Repeat blood cultures from 8/5 negative. Palliative Medicine has been consulted for goals of care and family support. Patient seen in room with daughter Roger Arthur at bedside. The patient is alert to self only. In no acute distress. Currently the patient is being taken for an echocardiogram.  Met with the patient's daughter to begin a discussion about goals of care. Roger Arthur and her sister Armando Abarca make medical decisions for the patient together. Introduced family to palliative medicine. Reviewed the patient's history and current condition. Roger Arthur states that the patient has had persistent decline since he experienced a fall in March 2018. He currently resides at a SNF with his wife who also has dementia. Discussed the trajectory of patients with dementia who experience events such as falls. 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 Neuro: awake, alert, oriented to self      DATA:      CBC with Differential:    Lab Results   Component Value Date    WBC 13.5 08/07/2018    RBC 3.62 08/07/2018    HGB 9.7 08/07/2018    HCT 30.1 08/07/2018    PLT 98 08/07/2018    MCV 83.1 08/07/2018    MCH 26.8 08/07/2018    MCHC 32.2 08/07/2018    RDW 18.5 08/07/2018    NRBC 0.9 08/07/2018    SEGSPCT 65 10/17/2013    METASPCT 7.9 08/05/2018    LYMPHOPCT 4.4 08/07/2018    MONOPCT 0.9 08/07/2018    MYELOPCT 0.9 08/07/2018    BASOPCT 0.3 08/07/2018    MONOSABS 0.14 08/07/2018    LYMPHSABS 0.54 08/07/2018    EOSABS 0.35 08/07/2018    BASOSABS 0.00 08/07/2018     CMP:    Lab Results   Component Value Date     08/07/2018    K 4.0 08/07/2018     08/07/2018    CO2 18 08/07/2018    BUN 36 08/07/2018    CREATININE 1.3 08/07/2018    GFRAA >60 08/07/2018    LABGLOM 52 08/07/2018    GLUCOSE 97 08/07/2018    PROT 5.6 08/07/2018    LABALBU 2.5 08/07/2018    CALCIUM 8.4 08/07/2018    BILITOT 0.3 08/07/2018    ALKPHOS 78 08/07/2018    AST 24 08/07/2018    ALT 12 08/07/2018         Cultures:    Blood Culture, Routine  (Abnormal) 08/04/2018 10:50 AM [facility] - [address] in both bottles     Organism  (Abnormal) 08/04/2018 10:50 AM MH - [address] Lab   Escherichia coli      Culture, Blood 2  (Abnormal) 08/04/2018 10:53 AM MH - [address] [facility]   Growth in both bottles     Organism  (Abnormal) 08/04/2018 10:53 AM [facility] - [address] [facility]   Escherichia coli     Culture, Blood 2       Urine Culture, Routine 08/04/2018 11:10 AM MH - [address] [facility]   Organism  (Abnormal) 08/04/2018 11:10 AM  - [address] [place] [address] Lab   Escherichia coli     Urine Culture, Routine 08/04/2018 11:10 AM  - [address] [place] [address] Lab   >100,000 CFU/ml      Repeat BCs 8/5 negative       Imaging:  CXR 8/4  Impression   Findings compatible with congestive heart failure. Alternatively pneumonia could give this appearance.    Findings compatible with atherosclerotic disease of the aorta     US 8/4  Impression   The palpable finding in the right lower quadrant is in   the subcutaneous fat, and is a primarily fluid filled structure with   some debris, suggesting a resolving hematoma or, if the patient has   been subjected to [address] procedure, it could be a small seroma. Abscess is felt to be unlikely.       The finding should be amenable to percutaneous sampling/aspiration   under physical exam palpation or ultrasound     CXR 8/5  Impression   Since examination dated 08/04/2018, slightly worsened hazy   bilateral lower lobe airspace opacities. Recommend clinical   correlation             IMPRESSION:   Patient Active Problem List   Diagnosis    Inability to ambulate due to knee    Left knee pain    Atrial fibrillation with RVR (HCC)    RF (renal failure)    Leukocytosis    Essential hypertension    HLD (hyperlipidemia)    History of gout    Hypothyroidism    GERD (gastroesophageal reflux disease)    Hip fracture (HCC)    Coronary atherosclerosis    Occlusion and stenosis of carotid artery with cerebral infarction    Pacemaker    Hematuria    Severe protein-calorie malnutrition (HCC)    Sepsis ([address]    Acute cystitis without hematuria    E coli bacteremia    Severe protein-energy malnutrition (HCC)         Assessment:/Plan: This is a 80 y.o. male admitted with altered mental status and hematuria. Found to have urosepsis and septic shock requiring transfer to MICU. The patient's condition has improved and he was transferred out of the ICU to continue IV antibiotics. 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 Palliative medicine has been consult for goals of care and family support.     Limited code  Continue current medical care with focus on quality of life   Family requesting update from medical team; medical resident paged and willing to speak with family  Palliative medicine will

## 2018-08-07 NOTE — PLAN OF CARE
Problem: Tissue Perfusion, Altered:  Goal: Circulatory function within specified parameters  Circulatory function within specified parameters   Outcome: Not Met This Shift  A FIB RVRTHROUGH THE NIGHT

## 2018-08-07 NOTE — PROGRESS NOTES
Per pt daughter Marielos Caraballo if there are any issues over night she is to be called. Dr. Rossy Dhaliwal with internal medicine also on unit to update pt family at this time.

## 2018-08-07 NOTE — PLAN OF CARE
Called at bedside after his HR increased to 130s. /80, EKG was obtained and revealed atrial fibrillation with RVR. Patient resting comfortable, asymptomatic. Labs from this morning were also reviewed, K 4.4, Mg 1.8. At this time, I will obtain BMP + magnesium STAT, replace electrolytes, and I will order lopressor IV with closely monitor of his blood pressure and reassess. Oscar Osorio M.D.   Internal Medicine Resident - PGY 2

## 2018-08-07 NOTE — PROGRESS NOTES
Team 2 saw patient, referred any issues with redman cath to Dr. Christina Amin. Left a message with office, awaiting call back.

## 2018-08-07 NOTE — PROGRESS NOTES
[facility]  Internal Medicine Residency Program  Progress Note - House Team 2    Patient:  Kenyetta Davalos 80 y.o. male MRN: [id number]     Date of Service: 8/7/2018     CC: AMS and hematuria  Overnight events: Per nursing, oriented to person but not place or time. HR increased to 130s, Bp 150/80. EKG revealed atrial fibrillation with RVR. Patient remained comfortable, asymptomatic. Subjective     Pt was seen and examined at bedside in AM. Patient was oriented to person but not place or time. He has no new complaints. Objective     Physical Exam:  · Vitals: /60   Pulse 100   Temp 98.9 °F (37.2 °C) (Oral)   Resp 20   Ht 6' 1\" (1.854 m)   Wt 168 lb 9.6 oz (76.5 kg)   SpO2 95%   BMI 22.24 kg/m²     · I & O - 24hr: I/O this shift:  · In: 0   · Out: 450 [Urine:450]   · General Appearance: alert, appears stated age, cooperative and no distress  · HEENT:  Head: Normal, normocephalic, atraumatic. · Neck: no adenopathy, no carotid bruit, no JVD, supple, symmetrical, trachea midline and thyroid not enlarged, symmetric, no tenderness/mass/nodules  · Lung: clear to auscultation bilaterally  · Heart: S1, S2 normal, no S3 or S4, no click and no rub, tachycardic   · Abdomen: soft, non-tender; bowel sounds normal; no masses,  no organomegaly  · Extremities:  extremities normal, atraumatic, no cyanosis or edema  · Musculokeletal: No joint swelling, no muscle tenderness. ROM normal in all joints of extremities. · Neurologic: Oriented to himself, but not place or time.  No focal motor deficit   Subject  Pertinent Labs & Imaging Studies   yessi  CBC with Differential:    Lab Results   Component Value Date    WBC 13.5 08/07/2018    RBC 3.62 08/07/2018    HGB 9.7 08/07/2018    HCT 30.1 08/07/2018    PLT 98 08/07/2018    MCV 83.1 08/07/2018    MCH 26.8 08/07/2018    MCHC 32.2 08/07/2018    RDW 18.5 08/07/2018    NRBC 0.9 08/07/2018    SEGSPCT 65 10/17/2013    METASPCT 7.9 08/05/2018    LYMPHOPCT 4.4 08/07/2018    MONOPCT 0.9 08/07/2018    MYELOPCT 0.9 08/07/2018    BASOPCT 0.3 08/07/2018    MONOSABS 0.14 08/07/2018    LYMPHSABS 0.54 08/07/2018    EOSABS 0.35 08/07/2018    BASOSABS 0.00 08/07/2018     CMP:    Lab Results   Component Value Date     08/07/2018    K 4.0 08/07/2018     08/07/2018    CO2 18 08/07/2018    BUN 36 08/07/2018    CREATININE 1.3 08/07/2018    GFRAA >60 08/07/2018    LABGLOM 52 08/07/2018    GLUCOSE 97 08/07/2018    PROT 5.6 08/07/2018    LABALBU 2.5 08/07/2018    CALCIUM 8.4 08/07/2018    BILITOT 0.3 08/07/2018    ALKPHOS 78 08/07/2018    AST 24 08/07/2018    ALT 12 08/07/2018     Albumin:    Lab Results   Component Value Date    LABALBU 2.5 08/07/2018     PT/INR:    Lab Results   Component Value Date    PROTIME 24.8 08/07/2018    INR 2.2 08/07/2018     Troponin:    Lab Results   Component Value Date    TROPONINI 0.12 08/06/2018     TSH:    Lab Results   Component Value Date    TSH 9.170 08/07/2018       Resident's Assessment and Plan     Kell Major is a 80 y.o. male    1. Acute metabolic encephalopathy - improving   -Likely 2/2 urosepsis   - Oriented to self but not place or time     2. Septic shock   - 2/2 urospesis   - Blood cultures positive for gram negative rods   - Urine culture grows E. Coli   -Switched to Rocephin from Zosyn   - Anticipate 4 weeks on Abx as E. Coli bacteremia likely to show ESBL   - Weaned off Levophed    3. Atrial Fibrillation - with rate controlled   - [id number] Score for Atrial Fibrillation Stroke Risk 3 pts   -Patient was tachycardic overnight ---> received IV Lopressor   - Lopressor 25 mg BID    -Held home coumadin due to hematuria, consult nephro to resume if urine is clear      4. CHF   -no Echo on file   -f/u echo ordered   - I/O 1.772 L negative     5. CKD stage IV   - per nephrology, Cr has improved and is close to baseline    - baseline Cr 1.5 mg/dL   - continue catheter change every 4 weeks     6.  Gross hematuria   -likely 2/2 trauma

## 2018-08-07 NOTE — PROGRESS NOTES
Mod A  Facilitated grooming tasks w/ education/cues for modified strategies. Mod/max cues for initiation, sequencing and attention. Upper Body Dressing Max A  Donned/doffed gown  Education/max cues for initiation, attention and sequencing     Lower Body Dressing Total A    Bathing NT    Toileting  Total A  (catheter)    Bed Mobility  Rolling left/right: mod A  2x each side for ADL's, repositioning and pad change. Education/cues on body mechanics and to utilize B bed rails to assist  Supine to Sit: Deferred d/t increased fatigue, confusion and pain. Functional Transfers NT            Sit balance: NT  Endurance/Activity tolerance: Poor  Pt on 2 L O2 via nasal cannula. Comments: Upon arrival pt lying in bed. At end of session pt lying in bed with all devices within reach, all lines and tubes intact. Safety alarm on     Treatment: Therapist facilitated self-care retraining: UB dressing tasks and grooming task while educating pt on modified techniques and providing mod to max cues for initiation, sequencing and attention. Also facilitated bed rolling 2x each side for toilet hygiene, repositioning to reduce skin breakdown (education/cueing for body mechanics, sequencing and attention). Skilled monitoring of HR, O2 sats and pts response to treatment.   Pt  demonstrating poor understanding of education/techniques, requiring additional training / education      Assessment of current deficits   Functional mobility [x]  ROM [] Strength [x]  Cognition [x]  ADLs [x]   IADLs [] Safety Awareness [x] Endurance [x]  Fine Motor Coordination [] Balance [x] Vision/perception [] Sensation []   Gross Motor Coordination []     Eval Complexity: Low    Treatment frequency: PRN     Plan of Care:  ADL retraining [x]   Equipment needs [x]   Neuromuscular re-education [] Energy Conservation Techniques [x]  Functional Transfer training [x] Patient and/or Family Education [x]  Functional Mobility training

## 2018-08-07 NOTE — PROGRESS NOTES
Unable to perform ROS: Dementia       Vitals:    08/07/18 0815   BP: 110/60   Pulse: 100   Resp: 20   Temp: 98.9 °F (37.2 °C)   SpO2: 95%     Physical Exam   Constitutional: No distress. Cardiovascular: Normal rate and regular rhythm. Pulmonary/Chest: Effort normal and breath sounds normal.   Genitourinary:   Genitourinary Comments: Spann   Musculoskeletal:   Sarcopenia of lower extremities   Nursing note and vitals reviewed. Recent Labs      08/06/18   0650  08/06/18   2244  08/07/18   0615   NA  142  143  141   K  4.1  4.1  4.0   CL  109*  111*  108*   CO2  17*  20*  18*   BUN  38*  40*  36*   CREATININE  1.4*  1.4*  1.3*   GLUCOSE  88  142*  97   CALCIUM  8.0*  8.5*  8.4*     Recent Labs      08/05/18   0520  08/06/18   0650  08/06/18   1720  08/07/18   0615   WBC  34.0*  28.9*   --   13.5*   RBC  3.20*  3.31*   --   3.62*   HGB  8.9*  9.3*  9.8*  9.7*   HCT  27.7*  27.8*  29.2*  30.1*   MCV  86.6  84.0   --   83.1   MCH  27.8  28.1   --   26.8   MCHC  32.1  33.5   --   32.2   RDW  18.8*  18.6*   --   18.5*   PLT  105*  101*   --   98*   MPV  11.6  11.8   --   12.5*         Active Problems:    Sepsis (HCC)    Shock ([address]    Acute cystitis without hematuria    E coli bacteremia    Severe protein-energy malnutrition (HCC)  Resolved Problems:    * No resolved hospital problems. *      Plan:  Protein Supplementation  Check Zinc Level  Anticipate 4 weeks antibiotics as E coli bacteremia will likely show ESBL  LTAC ?  On DC    Electronically signed by Rossy Vargas MD on 8/7/2018 at 8:43 AM

## 2018-08-08 ENCOUNTER — APPOINTMENT (OUTPATIENT)
Dept: GENERAL RADIOLOGY | Age: 83
DRG: 698 | End: 2018-08-08
Payer: MEDICARE

## 2018-08-08 LAB
ALBUMIN SERPL-MCNC: 2.6 G/DL (ref 3.5–5.2)
ALP BLD-CCNC: 76 U/L (ref 40–129)
ALT SERPL-CCNC: 12 U/L (ref 0–40)
ANION GAP SERPL CALCULATED.3IONS-SCNC: 16 MMOL/L (ref 7–16)
ANISOCYTOSIS: ABNORMAL
AST SERPL-CCNC: 19 U/L (ref 0–39)
BASOPHILS ABSOLUTE: 0 E9/L (ref 0–0.2)
BASOPHILS RELATIVE PERCENT: 0.5 % (ref 0–2)
BILIRUB SERPL-MCNC: 0.3 MG/DL (ref 0–1.2)
BILIRUBIN DIRECT: <0.2 MG/DL (ref 0–0.3)
BILIRUBIN, INDIRECT: ABNORMAL MG/DL (ref 0–1)
BUN BLDV-MCNC: 28 MG/DL (ref 8–23)
CALCIUM SERPL-MCNC: 8.3 MG/DL (ref 8.6–10.2)
CHLORIDE BLD-SCNC: 108 MMOL/L (ref 98–107)
CO2: 19 MMOL/L (ref 22–29)
CREAT SERPL-MCNC: 1.1 MG/DL (ref 0.7–1.2)
DIGOXIN LEVEL: 0.2 NG/ML (ref 0.8–2)
EOSINOPHILS ABSOLUTE: 0.22 E9/L (ref 0.05–0.5)
EOSINOPHILS RELATIVE PERCENT: 3.5 % (ref 0–6)
GFR AFRICAN AMERICAN: >60
GFR NON-AFRICAN AMERICAN: >60 ML/MIN/1.73
GLUCOSE BLD-MCNC: 103 MG/DL (ref 74–109)
HCT VFR BLD CALC: 32.2 % (ref 37–54)
HEMOGLOBIN: 10.3 G/DL (ref 12.5–16.5)
INR BLD: 2.5
LYMPHOCYTES ABSOLUTE: 0.62 E9/L (ref 1.5–4)
LYMPHOCYTES RELATIVE PERCENT: 9.6 % (ref 20–42)
MAGNESIUM: 1.8 MG/DL (ref 1.6–2.6)
MCH RBC QN AUTO: 26.7 PG (ref 26–35)
MCHC RBC AUTO-ENTMCNC: 32 % (ref 32–34.5)
MCV RBC AUTO: 83.4 FL (ref 80–99.9)
METAMYELOCYTES RELATIVE PERCENT: 0.9 % (ref 0–1)
METER GLUCOSE: 115 MG/DL (ref 70–110)
METER GLUCOSE: 128 MG/DL (ref 70–110)
METER GLUCOSE: 99 MG/DL (ref 70–110)
MONOCYTES ABSOLUTE: 0.25 E9/L (ref 0.1–0.95)
MONOCYTES RELATIVE PERCENT: 4.3 % (ref 2–12)
NEUTROPHILS ABSOLUTE: 5.15 E9/L (ref 1.8–7.3)
NEUTROPHILS RELATIVE PERCENT: 81.7 % (ref 43–80)
OVALOCYTES: ABNORMAL
PDW BLD-RTO: 18.4 FL (ref 11.5–15)
PLATELET # BLD: 98 E9/L (ref 130–450)
PLATELET CONFIRMATION: NORMAL
PMV BLD AUTO: 11.6 FL (ref 7–12)
POIKILOCYTES: ABNORMAL
POTASSIUM REFLEX MAGNESIUM: 3.8 MMOL/L (ref 3.5–5)
PROTHROMBIN TIME: 27.4 SEC (ref 9.3–12.4)
RBC # BLD: 3.86 E12/L (ref 3.8–5.8)
SODIUM BLD-SCNC: 143 MMOL/L (ref 132–146)
TOTAL PROTEIN: 5.7 G/DL (ref 6.4–8.3)
TSH SERPL DL<=0.05 MIU/L-ACNC: 8.17 UIU/ML (ref 0.27–4.2)
WBC # BLD: 6.2 E9/L (ref 4.5–11.5)

## 2018-08-08 PROCEDURE — 6370000000 HC RX 637 (ALT 250 FOR IP): Performed by: INTERNAL MEDICINE

## 2018-08-08 PROCEDURE — 85025 COMPLETE CBC W/AUTO DIFF WBC: CPT

## 2018-08-08 PROCEDURE — 80048 BASIC METABOLIC PNL TOTAL CA: CPT

## 2018-08-08 PROCEDURE — 6360000002 HC RX W HCPCS: Performed by: INTERNAL MEDICINE

## 2018-08-08 PROCEDURE — 97161 PT EVAL LOW COMPLEX 20 MIN: CPT

## 2018-08-08 PROCEDURE — 2140000000 HC CCU INTERMEDIATE R&B

## 2018-08-08 PROCEDURE — 36415 COLL VENOUS BLD VENIPUNCTURE: CPT

## 2018-08-08 PROCEDURE — G8981 BODY POS CURRENT STATUS: HCPCS

## 2018-08-08 PROCEDURE — G8982 BODY POS GOAL STATUS: HCPCS

## 2018-08-08 PROCEDURE — 2580000003 HC RX 258: Performed by: INTERNAL MEDICINE

## 2018-08-08 PROCEDURE — 85610 PROTHROMBIN TIME: CPT

## 2018-08-08 PROCEDURE — 80076 HEPATIC FUNCTION PANEL: CPT

## 2018-08-08 PROCEDURE — 84443 ASSAY THYROID STIM HORMONE: CPT

## 2018-08-08 PROCEDURE — 71045 X-RAY EXAM CHEST 1 VIEW: CPT

## 2018-08-08 PROCEDURE — 99233 SBSQ HOSP IP/OBS HIGH 50: CPT | Performed by: INTERNAL MEDICINE

## 2018-08-08 PROCEDURE — 80162 ASSAY OF DIGOXIN TOTAL: CPT

## 2018-08-08 PROCEDURE — 82962 GLUCOSE BLOOD TEST: CPT

## 2018-08-08 PROCEDURE — 97530 THERAPEUTIC ACTIVITIES: CPT

## 2018-08-08 PROCEDURE — 83735 ASSAY OF MAGNESIUM: CPT

## 2018-08-08 PROCEDURE — 97110 THERAPEUTIC EXERCISES: CPT

## 2018-08-08 RX ORDER — CEFTRIAXONE 500 MG/1
2 INJECTION, POWDER, FOR SOLUTION INTRAMUSCULAR; INTRAVENOUS EVERY 24 HOURS
Qty: 20 G | Refills: 0 | DISCHARGE
Start: 2018-08-08 | End: 2018-08-18

## 2018-08-08 RX ORDER — LOSARTAN POTASSIUM 25 MG/1
25 TABLET ORAL DAILY
Status: DISCONTINUED | OUTPATIENT
Start: 2018-08-08 | End: 2018-08-09 | Stop reason: HOSPADM

## 2018-08-08 RX ADMIN — METOPROLOL TARTRATE 25 MG: 25 TABLET, FILM COATED ORAL at 09:58

## 2018-08-08 RX ADMIN — DIVALPROEX SODIUM 125 MG: 125 TABLET, DELAYED RELEASE ORAL at 13:54

## 2018-08-08 RX ADMIN — LEVOTHYROXINE SODIUM 100 MCG: 100 TABLET ORAL at 05:30

## 2018-08-08 RX ADMIN — MICONAZOLE NITRATE: 20 CREAM TOPICAL at 09:58

## 2018-08-08 RX ADMIN — LOSARTAN POTASSIUM 25 MG: 25 TABLET, FILM COATED ORAL at 16:54

## 2018-08-08 RX ADMIN — DIVALPROEX SODIUM 125 MG: 125 TABLET, DELAYED RELEASE ORAL at 09:58

## 2018-08-08 RX ADMIN — CHLORHEXIDINE GLUCONATE 0.12% ORAL RINSE 15 ML: 1.2 LIQUID ORAL at 09:58

## 2018-08-08 RX ADMIN — VITAMIN D, TAB 1000IU (100/BT) 1000 UNITS: 25 TAB at 09:58

## 2018-08-08 RX ADMIN — METOPROLOL TARTRATE 25 MG: 25 TABLET, FILM COATED ORAL at 22:16

## 2018-08-08 RX ADMIN — SIMVASTATIN 40 MG: 40 TABLET, FILM COATED ORAL at 22:16

## 2018-08-08 RX ADMIN — NYSTATIN 500000 UNITS: 100000 SUSPENSION ORAL at 16:54

## 2018-08-08 RX ADMIN — NYSTATIN 500000 UNITS: 100000 SUSPENSION ORAL at 09:58

## 2018-08-08 RX ADMIN — DIVALPROEX SODIUM 125 MG: 125 TABLET, DELAYED RELEASE ORAL at 22:16

## 2018-08-08 RX ADMIN — CEFTRIAXONE 2 G: 2 INJECTION, POWDER, FOR SOLUTION INTRAMUSCULAR; INTRAVENOUS at 11:31

## 2018-08-08 RX ADMIN — Medication 10 ML: at 09:58

## 2018-08-08 RX ADMIN — MICONAZOLE NITRATE: 20 CREAM TOPICAL at 22:17

## 2018-08-08 RX ADMIN — MELATONIN 3 MG ORAL TABLET 6 MG: 3 TABLET ORAL at 22:16

## 2018-08-08 ASSESSMENT — PAIN SCALES - GENERAL
PAINLEVEL_OUTOF10: 0
PAINLEVEL_OUTOF10: 0

## 2018-08-08 NOTE — PROGRESS NOTES
NRBC 0.9 08/07/2018    SEGSPCT 65 10/17/2013    METASPCT 0.9 08/08/2018    LYMPHOPCT 9.6 08/08/2018    MONOPCT 4.3 08/08/2018    MYELOPCT 0.9 08/07/2018    BASOPCT 0.5 08/08/2018    MONOSABS 0.25 08/08/2018    LYMPHSABS 0.62 08/08/2018    EOSABS 0.22 08/08/2018    BASOSABS 0.00 08/08/2018     Hemoglobin/Hematocrit:    Lab Results   Component Value Date    HGB 10.3 08/08/2018    HCT 32.2 08/08/2018     PT/INR:    Lab Results   Component Value Date    PROTIME 27.4 08/08/2018    INR 2.5 08/08/2018     Last 3 Troponin:    Lab Results   Component Value Date    TROPONINI 0.12 08/06/2018    TROPONINI 0.13 08/05/2018    TROPONINI 0.02 06/14/2018     U/A:    Lab Results   Component Value Date    COLORU RED 08/04/2018    PROTEINU 100 08/04/2018    PHUR 7.0 08/04/2018    WBCUA >20 08/04/2018    RBCUA PACKED 08/04/2018    RBCUA NONE 03/11/2014    BACTERIA MANY 08/04/2018    CLARITYU CLOUDY 08/04/2018    SPECGRAV 1.015 08/04/2018    LEUKOCYTESUR LARGE 08/04/2018    UROBILINOGEN 1.0 08/04/2018    BILIRUBINUR SMALL 08/04/2018    BLOODU LARGE 08/04/2018    GLUCOSEU Negative 08/04/2018     HgBA1c:    Lab Results   Component Value Date    LABA1C 6.9 07/03/2018     TSH:    Lab Results   Component Value Date    TSH 8.170 08/08/2018       Resident's Assessment and Plan     Shirley Mohamud is a 80 y.o. male with PMH of A. Fib with RVR, sick sinus syndrome following pacemake placement (3/2018), HTN, HLD, DM type II, hypothyroidism, s/p left hip ORIF (3/2018), CKD stage III, OA, hx of bladder cancer with recurrent hematuria, and BPH presented with altered mental status and hematuria. 1. Acute metabolic encephalopathy, improving   -likely 2/2 to urosepsis   -Oriented to self and place but not time   -Pt is somnolent but mentation improved from yesterday   -On depakote     2.  Septic shock - resolving    - 2/2 urosepsis due to recurrent UTI from chronic catheterization   - Blood cultures positive for gram negative rods   -Urine culture

## 2018-08-08 NOTE — PROGRESS NOTES
SPEECH/LANGUAGE PATHOLOGY  VIDEOFLUOROSCOPIC STUDY OF SWALLOWING (MBS)      PATIENT NAME:  Mata Borja      :  6/3/1926      TODAY'S DATE:  2018    SUMMARY OF EVALUATION     DYSPHAGIA DIAGNOSIS:  Marked pharyngeal dysphagia     DIET RECOMMENDATIONS:  NPO (nothing by mouth including oral meds)     COMPENSATORY STRATEGIES:      []Double swallow with []all consistencies []thin []nectar []honey []pureed []ground []chopped []soft solid []solid       []Multiple swallow (  Times) with []all consistencies []thin []nectar []honey []pureed []ground []chopped []soft solid []solid     []Chin tuck with []all consistencies  []thin []nectar []honey []pureed []ground []chopped []soft solid []solid      []Throat clear after with []all consistencies []thin []nectar []honey []pureed []ground []chopped []soft solid []solid      []Effortful swallow with []all consistencies  []thin []nectar []honey []pureed []ground []chopped []soft solid []solid     []Small bites/sips        []Alternate solids / liquids      []Check for oral pocketing     []No straw            []Spoon sip liquids        []       ASSISTANCE LEVEL:  []No assistance required   []Stand by assist   []Full assistance required  []Set up required   []Supervision with all PO intake    [] Malnutrition indicators have been identified and nursing has been notified to ensure a dietary consult is ordered.      THERAPY RECOMMENDATIONS:       []  Therapy is not recommended       []  Therapy is recommended to:     []  Improve oral motor strength and range of motion     []  Improve tongue base retraction      []  Improve laryngeal strength and range of motion     []  Address cricopharyngeal dysfunction (Shaker Exercises)    []  Mealtime assessment of patient's tolerance of prescribed diet     [x]  Repeat Video Swallowing Evaluation is recommended when her respiratory status improves and requires a Physician order    []Therapy at the discretion of facility/treating Speech times)  []liquid chaser)      []Residuals in the vallecula were noted due to inadequate epiglottic inversion        Pyriform Sinuses    []No significant residuals were noted in the pyriform sinuses      [x] Residuals in the pyriform sinuses were noted due to ([x]pharyngeal weakness []cricopharyngeal dysfunction.)       These residuals were noted for ([]thin []nectar [x]honey [x]pureed []solid)       which ([]cleared []did not clear  []partially cleared  [x]mostly cleared)        with ([x]cued []spontaneous  [x]double swallow []multiple swallow (  times) []liquid chaser)    LARYNGEAL PENETRATION   []Laryngeal penetration was not present during this evaluation      [x]Laryngeal penetration occurred prior to aspiration. Further details under aspiration section. [x]Laryngeal penetration occurred in the absence of aspiration:       [x]BEFORE the swallow for ([]thin []nectar []honey[] pureed []solid)      due to: [] decreased bolus formation      []premature pharyngeal entry       [x]inhalation while food was in the hypopharynx due to SOB         which  []cleared from the laryngeal vestibule spontaneously  (transient)     []cleared from the laryngeal vestibule with a cued, re-directive throat clear       [x]remained in the laryngeal vestibule. [x]penetrated deep into the laryngeal vestibule (to the level of the true vocal folds)      Laryngeal penetration was  ([]trace [x]mild []moderate []marked []severe ) and      occurred ([x]inconsistently  []consistently []only with use of a straw). [x]DURING  the swallow for ([x]thin [x]nectar []honey[] pureed []solid)       due to: []delayed laryngeal closure      [x]inadequate laryngeal closure        which  []cleared from the laryngeal vestibule spontaneously  (transient)     []cleared from the laryngeal vestibule with a cued, re-directive throat clear       [x]remained in the laryngeal vestibule.       [x]penetrated deep into the laryngeal vestibule (to the

## 2018-08-08 NOTE — PROGRESS NOTES
refused cystoscopy in the past.  Recommend outpt follow up with Dr. René Mata.   Please call if needed          ALEXIS Chambers - CNP   [facility]  Urology

## 2018-08-08 NOTE — PROGRESS NOTES
Physical Therapy    Facility/Department: [facility]  Initial Assessment    NAME: [facility]  : 6/3/1926  MRN: [id number]    Date of Service: 2018  Evaluating Therapist: Danielle Miller DPT    Room #: 9100X  DIAGNOSIS: sepsis  PRECAUTIONS: falls, TSM  Pertinent Medical History: A-fib, CAD, h/o prostate CA, DMII, HTN, HLD, depression, h/o L hip fracture s/p ORIF 3/22/18, pacemaker, h/o prostate surgery    Social: provided by pt's daughter and wife  Prior to this admission, pt was at [facility]. Per daughter, pt has had decline since L femur fx in 2018  At [facility] pt required assist with I/ADLS, and all mobility. Pt was transferring with ww from w/c<>bed with assistance, taking just a few steps with assistance and ww PTA   . Initial Evaluation  Date: 18 Treatment  Date: NA  Short Term/ Long Term   Goals   Was pt agreeable to Eval/treatment? yes     Did pt report pain? Pt reported pain in B LEs and L hip during activity, not rated      Bed Mobility  Rolling: min A   Supine to sit: max A   Sit to supine: max A   Scooting: mod A to EOB   Mod A    Transfers Sit to stand: NT pt adamantly refused   Stand to sit:   Stand pivot: Mod A    Ambulation    NT   5 feet with ww and mod A     Stair negotiation: ascended and descended NT   NA   [facility] Raw Score 9/24       Alertness/Orientation: xself, place, month, current president, unaware of year. Pt intermittently confused and lethargic during conversation  LE AROM: Grossly WFL  LE Strength: Grossly 4+/5 RLE, LLE 3+/5--limited by hip pain  Static Balance: sitting SBA   Dynamic Balance: sitting min A <> SBA  Endurance: limited   Sensation: denied numbness/tingling  Edema/Skin Integrity: B LE venous stasis hemosiderin staining     Chair/Bed Alarm: TSM in room     ASSESSMENT/TREATMENT  Pt displays functional ability as noted in the objective portion of this evaluation.       Pt performed the following therapeutic activities/exercise:   Seated ankle pumps,

## 2018-08-08 NOTE — PROGRESS NOTES
Progress  Note  Current Facility-Administered Medications   Medication Dose Route Frequency Provider Last Rate Last Dose    metoprolol tartrate (LOPRESSOR) tablet 25 mg  25 mg Oral BID Dominique Betts MD   25 mg at 08/08/18 0958    chlorhexidine (PERIDEX) 0.12 % solution 15 mL  15 mL Mouth/Throat BID Robi Espino MD   15 mL at 08/08/18 0958    nystatin (MYCOSTATIN) 884317 UNIT/ML suspension 500,000 Units  5 mL Oral 4x Daily Robi Espino MD   500,000 Units at 08/08/18 0958    divalproex (DEPAKOTE) DR tablet 125 mg  125 mg Oral TID Tiffany Harrison MD   125 mg at 08/08/18 1354    cefTRIAXone (ROCEPHIN) 2 g in sterile water 20 mL IV syringe  2 g Intravenous Q24H Jaden Ruiz MD   2 g at 08/08/18 1131    miconazole (MICOTIN) 2 % cream   Topical BID Dominique Betts MD        mineral oil-hydrophilic petrolatum (HYDROPHOR) ointment   Topical BID PRN Dominique Betts MD        sodium chloride flush 0.9 % injection 10 mL  10 mL Intravenous 2 times per day James Harris MD   10 mL at 08/07/18 2255    sodium chloride flush 0.9 % injection 10 mL  10 mL Intravenous PRN James Harris MD   10 mL at 08/06/18 2351    acetaminophen (TYLENOL) tablet 650 mg  650 mg Oral Q4H PRN Florin Jamison MD   650 mg at 08/05/18 0422    vitamin D (CHOLECALCIFEROL) tablet 1,000 Units  1,000 Units Oral Daily Florin Jamison MD   1,000 Units at 08/08/18 4101    levothyroxine (SYNTHROID) tablet 100 mcg  100 mcg Oral Daily Florin Jamison MD   100 mcg at 08/08/18 0530    melatonin tablet 6 mg  6 mg Oral Nightly Florin Jamison MD   6 mg at 08/07/18 2252    simvastatin (ZOCOR) tablet 40 mg  40 mg Oral Nightly Florin Jamison MD   40 mg at 08/07/18 2252    sodium chloride flush 0.9 % injection 10 mL  10 mL Intravenous 2 times per day Florin Jamison MD   10 mL at 08/08/18 0958    sodium chloride flush 0.9 % injection 10 mL  10 mL Intravenous PRN Florin Jamison MD   10 mL at 08/05/18 0007    magnesium hydroxide (MILK OF MAGNESIA)

## 2018-08-09 ENCOUNTER — APPOINTMENT (OUTPATIENT)
Dept: GENERAL RADIOLOGY | Age: 83
DRG: 698 | End: 2018-08-09
Payer: MEDICARE

## 2018-08-09 VITALS
HEART RATE: 95 BPM | HEIGHT: 73 IN | RESPIRATION RATE: 18 BRPM | WEIGHT: 163.7 LBS | DIASTOLIC BLOOD PRESSURE: 88 MMHG | OXYGEN SATURATION: 94 % | TEMPERATURE: 97.4 F | SYSTOLIC BLOOD PRESSURE: 159 MMHG | BODY MASS INDEX: 21.69 KG/M2

## 2018-08-09 PROBLEM — A41.9 SEPSIS (HCC): Status: RESOLVED | Noted: 2018-08-04 | Resolved: 2018-08-09

## 2018-08-09 PROBLEM — E43 SEVERE PROTEIN-ENERGY MALNUTRITION (HCC): Status: RESOLVED | Noted: 2018-08-07 | Resolved: 2018-08-09

## 2018-08-09 PROBLEM — R31.9 HEMATURIA: Status: RESOLVED | Noted: 2018-06-29 | Resolved: 2018-08-09

## 2018-08-09 PROBLEM — B96.20 E COLI BACTEREMIA: Status: RESOLVED | Noted: 2018-08-07 | Resolved: 2018-08-09

## 2018-08-09 PROBLEM — R78.81 E COLI BACTEREMIA: Status: RESOLVED | Noted: 2018-08-07 | Resolved: 2018-08-09

## 2018-08-09 LAB
ANION GAP SERPL CALCULATED.3IONS-SCNC: 11 MMOL/L (ref 7–16)
ANISOCYTOSIS: ABNORMAL
BASOPHILS ABSOLUTE: 0 E9/L (ref 0–0.2)
BASOPHILS RELATIVE PERCENT: 0.6 % (ref 0–2)
BUN BLDV-MCNC: 24 MG/DL (ref 8–23)
CALCIUM SERPL-MCNC: 8.5 MG/DL (ref 8.6–10.2)
CHLORIDE BLD-SCNC: 110 MMOL/L (ref 98–107)
CO2: 21 MMOL/L (ref 22–29)
CREAT SERPL-MCNC: 1.1 MG/DL (ref 0.7–1.2)
EOSINOPHILS ABSOLUTE: 0.23 E9/L (ref 0.05–0.5)
EOSINOPHILS RELATIVE PERCENT: 4.3 % (ref 0–6)
GFR AFRICAN AMERICAN: >60
GFR NON-AFRICAN AMERICAN: >60 ML/MIN/1.73
GLUCOSE BLD-MCNC: 85 MG/DL (ref 74–109)
HCT VFR BLD CALC: 31.7 % (ref 37–54)
HEMOGLOBIN: 10 G/DL (ref 12.5–16.5)
INR BLD: 1.9
LYMPHOCYTES ABSOLUTE: 1.11 E9/L (ref 1.5–4)
LYMPHOCYTES RELATIVE PERCENT: 20.9 % (ref 20–42)
MAGNESIUM: 1.8 MG/DL (ref 1.6–2.6)
MCH RBC QN AUTO: 26.7 PG (ref 26–35)
MCHC RBC AUTO-ENTMCNC: 31.5 % (ref 32–34.5)
MCV RBC AUTO: 84.5 FL (ref 80–99.9)
METAMYELOCYTES RELATIVE PERCENT: 1.7 % (ref 0–1)
METER GLUCOSE: 105 MG/DL (ref 70–110)
METER GLUCOSE: 105 MG/DL (ref 70–110)
MONOCYTES ABSOLUTE: 0.69 E9/L (ref 0.1–0.95)
MONOCYTES RELATIVE PERCENT: 13 % (ref 2–12)
MYELOCYTE PERCENT: 1.7 % (ref 0–0)
NEUTROPHILS ABSOLUTE: 3.29 E9/L (ref 1.8–7.3)
NEUTROPHILS RELATIVE PERCENT: 58.3 % (ref 43–80)
OVALOCYTES: ABNORMAL
PDW BLD-RTO: 18.5 FL (ref 11.5–15)
PLATELET # BLD: 92 E9/L (ref 130–450)
PLATELET CONFIRMATION: NORMAL
PMV BLD AUTO: 10.8 FL (ref 7–12)
POIKILOCYTES: ABNORMAL
POLYCHROMASIA: ABNORMAL
POTASSIUM REFLEX MAGNESIUM: 3.8 MMOL/L (ref 3.5–5)
PROTHROMBIN TIME: 21.6 SEC (ref 9.3–12.4)
RBC # BLD: 3.75 E12/L (ref 3.8–5.8)
SODIUM BLD-SCNC: 142 MMOL/L (ref 132–146)
WBC # BLD: 5.3 E9/L (ref 4.5–11.5)

## 2018-08-09 PROCEDURE — 82962 GLUCOSE BLOOD TEST: CPT

## 2018-08-09 PROCEDURE — 36415 COLL VENOUS BLD VENIPUNCTURE: CPT

## 2018-08-09 PROCEDURE — 85610 PROTHROMBIN TIME: CPT

## 2018-08-09 PROCEDURE — 2580000003 HC RX 258: Performed by: INTERNAL MEDICINE

## 2018-08-09 PROCEDURE — 92611 MOTION FLUOROSCOPY/SWALLOW: CPT

## 2018-08-09 PROCEDURE — 83735 ASSAY OF MAGNESIUM: CPT

## 2018-08-09 PROCEDURE — 80048 BASIC METABOLIC PNL TOTAL CA: CPT

## 2018-08-09 PROCEDURE — 6370000000 HC RX 637 (ALT 250 FOR IP): Performed by: INTERNAL MEDICINE

## 2018-08-09 PROCEDURE — 88112 CYTOPATH CELL ENHANCE TECH: CPT

## 2018-08-09 PROCEDURE — 92610 EVALUATE SWALLOWING FUNCTION: CPT

## 2018-08-09 PROCEDURE — 2580000003 HC RX 258: Performed by: EMERGENCY MEDICINE

## 2018-08-09 PROCEDURE — 6360000002 HC RX W HCPCS: Performed by: INTERNAL MEDICINE

## 2018-08-09 PROCEDURE — 99231 SBSQ HOSP IP/OBS SF/LOW 25: CPT | Performed by: CLINICAL NURSE SPECIALIST

## 2018-08-09 PROCEDURE — 85025 COMPLETE CBC W/AUTO DIFF WBC: CPT

## 2018-08-09 PROCEDURE — 2500000003 HC RX 250 WO HCPCS: Performed by: INTERNAL MEDICINE

## 2018-08-09 PROCEDURE — 74230 X-RAY XM SWLNG FUNCJ C+: CPT

## 2018-08-09 PROCEDURE — C1751 CATH, INF, PER/CENT/MIDLINE: HCPCS

## 2018-08-09 RX ORDER — WARFARIN SODIUM 5 MG/1
5 TABLET ORAL WEEKLY
Status: CANCELLED | OUTPATIENT
Start: 2018-08-09

## 2018-08-09 RX ORDER — OXYCODONE HYDROCHLORIDE AND ACETAMINOPHEN 5; 325 MG/1; MG/1
1 TABLET ORAL EVERY 6 HOURS PRN
Status: DISCONTINUED | OUTPATIENT
Start: 2018-08-09 | End: 2018-08-09 | Stop reason: HOSPADM

## 2018-08-09 RX ORDER — LEVOTHYROXINE SODIUM 0.1 MG/1
100 TABLET ORAL DAILY
Status: CANCELLED | OUTPATIENT
Start: 2018-08-10

## 2018-08-09 RX ORDER — WARFARIN SODIUM 4 MG/1
4 TABLET ORAL DAILY
Status: CANCELLED | OUTPATIENT
Start: 2018-08-09

## 2018-08-09 RX ORDER — ASPIRIN 81 MG/1
81 TABLET, CHEWABLE ORAL DAILY
Status: DISCONTINUED | OUTPATIENT
Start: 2018-08-09 | End: 2018-08-09

## 2018-08-09 RX ORDER — ASPIRIN 81 MG/1
81 TABLET, CHEWABLE ORAL DAILY
Status: CANCELLED | OUTPATIENT
Start: 2018-08-10

## 2018-08-09 RX ORDER — SODIUM CHLORIDE 0.9 % (FLUSH) 0.9 %
10 SYRINGE (ML) INJECTION PRN
Status: CANCELLED | OUTPATIENT
Start: 2018-08-09

## 2018-08-09 RX ORDER — LANOLIN ALCOHOL/MO/W.PET/CERES
6 CREAM (GRAM) TOPICAL NIGHTLY
Status: CANCELLED | OUTPATIENT
Start: 2018-08-09

## 2018-08-09 RX ORDER — WARFARIN SODIUM 4 MG/1
4 TABLET ORAL
Status: DISCONTINUED | OUTPATIENT
Start: 2018-08-09 | End: 2018-08-09 | Stop reason: HOSPADM

## 2018-08-09 RX ORDER — ACETAMINOPHEN 325 MG/1
650 TABLET ORAL EVERY 4 HOURS PRN
Status: CANCELLED | OUTPATIENT
Start: 2018-08-09

## 2018-08-09 RX ORDER — CHLORHEXIDINE GLUCONATE 0.12 MG/ML
15 RINSE ORAL 2 TIMES DAILY
Status: CANCELLED | OUTPATIENT
Start: 2018-08-09

## 2018-08-09 RX ORDER — PETROLATUM 42 G/100G
OINTMENT TOPICAL 2 TIMES DAILY PRN
Status: CANCELLED | OUTPATIENT
Start: 2018-08-09

## 2018-08-09 RX ORDER — SIMVASTATIN 40 MG
40 TABLET ORAL NIGHTLY
Status: CANCELLED | OUTPATIENT
Start: 2018-08-09

## 2018-08-09 RX ORDER — SODIUM CHLORIDE 0.9 % (FLUSH) 0.9 %
10 SYRINGE (ML) INJECTION EVERY 12 HOURS SCHEDULED
Status: CANCELLED | OUTPATIENT
Start: 2018-08-09

## 2018-08-09 RX ORDER — SODIUM CHLORIDE, SODIUM LACTATE, POTASSIUM CHLORIDE, CALCIUM CHLORIDE 600; 310; 30; 20 MG/100ML; MG/100ML; MG/100ML; MG/100ML
INJECTION, SOLUTION INTRAVENOUS CONTINUOUS
Status: DISCONTINUED | OUTPATIENT
Start: 2018-08-09 | End: 2018-08-09

## 2018-08-09 RX ORDER — LOSARTAN POTASSIUM 25 MG/1
25 TABLET ORAL DAILY
Status: CANCELLED | OUTPATIENT
Start: 2018-08-10

## 2018-08-09 RX ORDER — DIVALPROEX SODIUM 125 MG/1
125 TABLET, DELAYED RELEASE ORAL 3 TIMES DAILY
Status: CANCELLED | OUTPATIENT
Start: 2018-08-09

## 2018-08-09 RX ORDER — ONDANSETRON 2 MG/ML
4 INJECTION INTRAMUSCULAR; INTRAVENOUS EVERY 6 HOURS PRN
Status: CANCELLED | OUTPATIENT
Start: 2018-08-09

## 2018-08-09 RX ORDER — WARFARIN SODIUM 5 MG/1
5 TABLET ORAL
Status: DISCONTINUED | OUTPATIENT
Start: 2018-08-12 | End: 2018-08-09 | Stop reason: HOSPADM

## 2018-08-09 RX ADMIN — Medication 10 ML: at 10:13

## 2018-08-09 RX ADMIN — Medication 10 ML: at 10:12

## 2018-08-09 RX ADMIN — VITAMIN D, TAB 1000IU (100/BT) 1000 UNITS: 25 TAB at 10:12

## 2018-08-09 RX ADMIN — MICONAZOLE NITRATE: 20 CREAM TOPICAL at 10:11

## 2018-08-09 RX ADMIN — ASPIRIN 81 MG 81 MG: 81 TABLET ORAL at 10:12

## 2018-08-09 RX ADMIN — NYSTATIN 500000 UNITS: 100000 SUSPENSION ORAL at 10:12

## 2018-08-09 RX ADMIN — BARIUM SULFATE 45 G: 0.6 CREAM ORAL at 13:41

## 2018-08-09 RX ADMIN — BARIUM SULFATE 115 ML: 960 POWDER, FOR SUSPENSION ORAL at 13:41

## 2018-08-09 RX ADMIN — DIVALPROEX SODIUM 125 MG: 125 TABLET, DELAYED RELEASE ORAL at 10:12

## 2018-08-09 RX ADMIN — CEFTRIAXONE 2 G: 2 INJECTION, POWDER, FOR SOLUTION INTRAMUSCULAR; INTRAVENOUS at 11:16

## 2018-08-09 RX ADMIN — LEVOTHYROXINE SODIUM 100 MCG: 100 TABLET ORAL at 07:41

## 2018-08-09 RX ADMIN — METOPROLOL TARTRATE 25 MG: 25 TABLET, FILM COATED ORAL at 10:12

## 2018-08-09 RX ADMIN — LOSARTAN POTASSIUM 25 MG: 25 TABLET, FILM COATED ORAL at 10:12

## 2018-08-09 RX ADMIN — CHLORHEXIDINE GLUCONATE 0.12% ORAL RINSE 15 ML: 1.2 LIQUID ORAL at 10:11

## 2018-08-09 RX ADMIN — DIVALPROEX SODIUM 125 MG: 125 TABLET, DELAYED RELEASE ORAL at 17:05

## 2018-08-09 ASSESSMENT — PAIN SCALES - GENERAL
PAINLEVEL_OUTOF10: 0
PAINLEVEL_OUTOF10: 0

## 2018-08-09 NOTE — PROGRESS NOTES
ID Progress Note      Brief Interval History    Subjective:  No new complaints  The patient is awake and alert. Tolerating medications. Reports no side effects. Afebrile. 10 ROS otherwise negative unless otherwise specified above. Objective:    Vitals:    08/09/18 0751   BP: (!) 160/95   Pulse: 82   Resp: 16   Temp: 97.9 °F (36.6 °C)   SpO2: 96%     General appearance: Alert, Awake, Oriented times 3, no distress  Skin: Warm and dry  Eyes: Pink palpebral conjunctivae. PERRL  Ears: External ears normal. No tragal tenderness. TM intact  Nose/Sinuses: Nares normal. Septum midline. Mucosa normal. No sinus tenderness. Oropharynx: Oropharynx clear with no exudates seen  Neck: Neck supple. No jugular venous distension, lymphadenopathy or thyromegaly Trachea midline  Lungs: Lungs clear to auscultation bilaterally. No rhonchi, crackles or wheezes  Heart: S1 S2  Irregular  Abdomen: Abdomen soft, non-tender. BS normal. No masses, organomegaly  Extremities: No edema, Peripheral pulses palpable  Musculoskeletal: Muscular strength appears intact. No joint effusion, tenderness, swelling or warmth    Labs:  Recent Labs      08/07/18   0615  08/08/18   0547  08/09/18   0548   WBC  13.5*  6.2  5.3   RBC  3.62*  3.86  3.75*   HGB  9.7*  10.3*  10.0*   HCT  30.1*  32.2*  31.7*   MCV  83.1  83.4  84.5   MCH  26.8  26.7  26.7   MCHC  32.2  32.0  31.5*   RDW  18.5*  18.4*  18.5*   PLT  98*  98*  92*   MPV  12.5*  11.6  10.8     CMP:    Lab Results   Component Value Date     08/09/2018    K 3.8 08/09/2018     08/09/2018    CO2 21 08/09/2018    BUN 24 08/09/2018    CREATININE 1.1 08/09/2018    GFRAA >60 08/09/2018    LABGLOM >60 08/09/2018    GLUCOSE 85 08/09/2018    PROT 5.7 08/08/2018    LABALBU 2.6 08/08/2018    CALCIUM 8.5 08/09/2018    BILITOT 0.3 08/08/2018    ALKPHOS 76 08/08/2018    AST 19 08/08/2018    ALT 12 08/08/2018          Microbiology :  No results for input(s): BC in the last 72 hours.   No results CA     Plan  Ceftriaxone for 2 weeks, 8 more days  Can be given through midline- order placed  Med rec has been done           Electronically signed by Jyothi Montano MD on 8/9/2018 at 8:27 AM

## 2018-08-09 NOTE — PROGRESS NOTES
SPEECH/LANGUAGE PATHOLOGY  VIDEOFLUOROSCOPIC STUDY OF SWALLOWING [place] [place]      PATIENT NAME:  Reed Moeller      :  6/3/1926      TODAY'S DATE:  2018    SUMMARY OF EVALUATION     DYSPHAGIA DIAGNOSIS:  Marked pharyngeal dysphagia     DIET RECOMMENDATIONS: NPO (nothing by mouth including oral meds)      COMPENSATORY STRATEGIES:      []Double swallow with []all consistencies []thin []nectar []honey []pureed []ground []chopped []soft solid []solid       []Multiple swallow (  Times) with []all consistencies []thin []nectar []honey []pureed []ground []chopped []soft solid []solid     []Chin tuck with []all consistencies  []thin []nectar []honey []pureed []ground []chopped []soft solid []solid      []Throat clear after with []all consistencies []thin []nectar []honey []pureed []ground []chopped []soft solid []solid      []Effortful swallow with []all consistencies  []thin []nectar []honey []pureed []ground []chopped []soft solid []solid     []Small bites/sips        []Alternate solids / liquids      []Check for oral pocketing     []No straw            []Spoon sip liquids        []       ASSISTANCE LEVEL:  []No assistance required   []Stand by assist   []Full assistance required  []Set up required   []Supervision with all PO intake    [] Malnutrition indicators have been identified and nursing has been notified to ensure a dietary consult is ordered.      THERAPY RECOMMENDATIONS:       []  Therapy is not recommended       [x]  Trial therapy is recommended to:     []  Improve oral motor strength and range of motion     [x]  Improve tongue base retraction      [x]  Improve laryngeal strength and range of motion     []  Address cricopharyngeal dysfunction (Shaker Exercises)    []  Mealtime assessment of patient's tolerance of prescribed diet     []  Repeat Video Swallowing Evaluation is recommended and requires a Physician order    []Therapy at the discretion of facility/treating Speech Pathologist penetration was  ([]trace []mild []moderate []marked []severe ) and      occurred ([]inconsistently  []consistently []only with use of a straw). [x]AFTER the swallow for ([]thin []nectar []honey[x] pureed []solid)          due to: [x]pharyngeal residual       []redirection of bolus from the esophagus        which  []cleared from the laryngeal vestibule spontaneously  (transient)     []cleared from the laryngeal vestibule with a cued, re-directive throat clear       [x]remained in the laryngeal vestibule. []penetrated deep into the laryngeal vestibule (to the level of the true vocal folds)      Laryngeal penetration was  ([]trace [x]mild []moderate []marked []severe ) and      occurred ([x]inconsistently  []consistently []only with use of a straw). In response to laryngeal penetration,  []A  spontaneous cough/throat clear [] an inconsistent  [] a delayed cough  [x]an absent cough/throat clear was noted     ASPIRATION    []Aspiration was not present during this evaluation      [x]Aspiration BEFORE the swallow was present for ([]thin[] nectar[x] honey []pureed []solid)       due to: ([] decreased bolus formation []premature pharyngeal entry [x]delayed pharyngeal swallow)       Aspiration was  ([]trace []mild []moderate [x]marked []severe ) and      occurred ([]inconsistently  [x]consistently []only with use of a straw). [x]Aspiration DURING the swallow was present for  ([x]thin []nectar []honey[] pureed []solid)      due to: ([]delayed laryngeal closure [x]inadequate laryngeal closure)       Aspiration was  ([]trace []mild []moderate [x]marked []severe ) and      occurred ([]inconsistently  [x]consistently []only with use of a straw).          []Aspiration AFTER  the swallow was present for ([]thin[] nectar []honey []pureed []solid)      due to:  ([]residuals in laryngeal vestibule []pharyngeal residual []redirection of bolus from the esophagus)            Aspiration was  ([]trace []mild []moderate []marked []severe ) and      occurred ([]inconsistently  []consistently []only with use of a straw). In response to aspiration,  []A  spontaneous cough/throat clear [] an inconsistent/delayed cough      [x]an absent cough/throat clear was noted     COMPENSATORY STRATEGIES    [] Compensatory strategies that were beneficial included: []chin tuck []double swallow []multiple swallow []alternating solids/liquids          []cued redirective cough []cued throat clear       [] Compensatory strategies that were not beneficial included: []chin tuck []double swallow []multiple swallow []alternating solids/liquids          []cued redirective cough []cued throat clear       [x] Compensatory strategies were not attempted. STRUCTURAL/FUNCTIONAL ANOMALIES    [x]No structural/functional anomalies were noted      []Inadequate velopharyngeal closure resulting in nasopharyngeal reflux. [] There was presence of Zenkers Diverticulum per Radiologist        []Comments:       CERVICAL ESOPHAGEAL STAGE : [x]Adequate []Inadequate  []Not Assessed     []Cervical osteophytes present per Radiologist   []Structural/mechanical abnormality in cervical esophagus per Radiologist  [] Redirection of bolus from the esophagus into pharynx                                []  Prognosis for improvements is   []  This plan will be re-evaluated and revised in 1 week  if warranted. []  Patient stated goals:   []  Treatment goals discussed with [] patient/  [] family. []  The []  patient/ []  family understand the diagnosis, prognosis and plan of care. [x]The admitting diagnosis and active problem list, as listed below have been reviewed prior to initiation of this evaluation.      ADMITTING DIAGNOSIS: Sepsis ([facility] [address] [A41.9]  Sepsis ([facility] [address] [A41.9]     ACTIVE PROBLEM LIST:   Patient Active Problem List   Diagnosis    Inability to ambulate due to knee    Left knee pain    Atrial fibrillation with RVR (HCC)    RF (renal failure)   

## 2018-08-09 NOTE — PROGRESS NOTES
Spoke to Dr. Jasvir Sadler who was on unit about giving medications, he states it is okay to give sips with meds.

## 2018-08-09 NOTE — CARE COORDINATION
Social Work/Discharge Planning;  Pt accepted by [facility]. Liaison spoke with family and pt will transfer to [place] [id number] today if medically ready. Room#6820.    Jovana LAUREANO

## 2018-08-09 NOTE — PROGRESS NOTES
(3/2018 EMR actual. Pt reports 180lb )  · % Weight Change: ~13lb (7%) wt loss x 5 mon   · Ideal Body Wt: 184 lb (83.5 kg), % Ideal Body 89%  · BMI Classification: BMI 18.5 - 24.9 Normal Weight     · Comparative Standards (Estimated Nutrition Needs):  · Estimated Daily Total Kcal: 6389-3639  · Estimated Daily Protein (g):     Nutrition Risk Level: Moderate    Nutrition Interventions: Continued Inpatient Monitoring, Education not appropriate at this time, Coordination of Care, Swallow Evaluation (pt status d/w RN)    Nutrition Evaluation:   · Evaluation: Goals set   · Goals: Nutrition Progression     · Monitoring: NPO Status, Weight, Mental Status/Confusion, Comparative Standards, Pertinent Labs, Skin Integrity, Fluid Balance, Ascites/Edema, Chewing/Swallowing    See Adult Nutrition Doc Flowsheet for more detail.      Electronically signed by Carol Paredes RD, LD on 8/9/18 at 11:11 AM    Contact Number: Ext [phone]

## 2018-08-09 NOTE — PROGRESS NOTES
08/08/18 2216    sodium chloride flush 0.9 % injection 10 mL  10 mL Intravenous 2 times per day Gold Wade MD   10 mL at 08/09/18 1012    sodium chloride flush 0.9 % injection 10 mL  10 mL Intravenous PRN Gold Wade MD   10 mL at 08/05/18 0007    magnesium hydroxide (MILK OF MAGNESIA) 400 MG/5ML suspension 30 mL  30 mL Oral Daily PRN Gold Wade MD        ondansetron [facility]) injection 4 mg  4 mg Intravenous Q6H PRN Gold Wade MD         Review of Systems   Unable to perform ROS: Dementia       Vitals:    08/09/18 0751   BP: (!) 160/95   Pulse: 82   Resp: 16   Temp: 97.9 °F (36.6 °C)   SpO2: 96%     Physical Exam   Constitutional: No distress. Cardiovascular: Normal rate and regular rhythm. Pulmonary/Chest: Effort normal and breath sounds normal.   Genitourinary:   Genitourinary Comments: Less hematuria   Musculoskeletal: He exhibits no edema. Nursing note and vitals reviewed.       Recent Labs      08/07/18   0615  08/08/18   0547  08/09/18   0548   NA  141  143  142   K  4.0  3.8  3.8   CL  108*  108*  110*   CO2  18*  19*  21*   BUN  36*  28*  24*   CREATININE  1.3*  1.1  1.1   GLUCOSE  97  103  85   CALCIUM  8.4*  8.3*  8.5*     Recent Labs      08/07/18   0615  08/08/18   0547  08/09/18   0548   WBC  13.5*  6.2  5.3   RBC  3.62*  3.86  3.75*   HGB  9.7*  10.3*  10.0*   HCT  30.1*  32.2*  31.7*   MCV  83.1  83.4  84.5   MCH  26.8  26.7  26.7   MCHC  32.2  32.0  31.5*   RDW  18.5*  18.4*  18.5*   PLT  98*  98*  92*   MPV  12.5*  11.6  10.8         Active Problems:    Sepsis ([facility] [address]    Acute cystitis without hematuria    E coli bacteremia    Severe protein-energy malnutrition ([facility] [address]    Palliative care by specialist  Resolved Problems:    Shock ([facility] [address]      Plan:  IV ATB  Work on [address]  Nutrition    Electronically signed by Madison Cabral MD on 8/9/2018 at 1:56 PM

## 2018-08-09 NOTE — PLAN OF CARE
Problem: Nutrition  Goal: Optimal nutrition therapy  Outcome: Ongoing  Nutrition Problem: Severe malnutrition, in context of acute illness or injury  Intervention: See RD Progress note  Nutritional Goals: Nutrition Progression

## 2018-08-09 NOTE — PROGRESS NOTES
continue to discuss goals including nutrition source with family. 8/7:  Patient seen in room with daughter Villa Mendoza at bedside. The patient is alert to self only. In no acute distress. Currently the patient is being taken for an echocardiogram.  Met with the patient's daughter to begin a discussion about goals of care. Villa Mendoza and her sister Desi Martinez make medical decisions for the patient together. Introduced family to palliative medicine. Reviewed the patient's history and current condition. Villa Mendoza states that the patient has had persistent decline since he experienced a fall in March 2018. He currently resides at a SNF with his wife who also has dementia. Discussed the trajectory of patients with dementia who experience events such as falls. 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 Villa Mendoza verbalized understanding and states that the goal is to continue the current level of care and maintain quality of life. When the patient no longer has quality of life Villa Mendoza and her sister will consider a transition to comfort focused care. Villa Mendoza felt that it would be helpful to have an update from the medical team so that she can better explain to her sister her father's current condition. Discussed with charge RN who paged the medical resident to speak with the family. Reviewed resuscitation status with the patient's daughter and she confirmed agreement with Limited code status. He states that both of her parents have written a do not resuscitate orders indicating no heroics at end of life. Palliative medicine will continue to follow the patient's progress and discuss goals of care with family.      Past Medical History:    Past Medical History:   Diagnosis Date    A-fib [facility])     Arthritis     CAD (coronary artery disease)     Cancer ([facility] [address]     PROSTATE    Depression     Diabetes mellitus ([facility] [address]     Hyperlipidemia     Hypertension     Thyroid disease Past Surgical History:      Past Surgical History:   Procedure Laterality Date    COLONOSCOPY      ENDOSCOPY, COLON, DIAGNOSTIC      FRACTURE SURGERY      HERNIA REPAIR      KNEE ARTHROSCOPY Left 7-1-13    debridement and medial menisectomy    PACEMAKER PLACEMENT  03/27/2018    Single chamber pacemaker    WA FEMORAL FX, OPEN TX Left 3/22/2018    LEFT HIP OPEN REDUCTION INTERNAL FIXATION performed by Massiel Link DO at [address] [place] [address] TONSILLECTOMY         Current Medications:     aspirin  81 mg Oral Daily    losartan  25 mg Oral Daily    metoprolol tartrate  25 mg Oral BID    chlorhexidine  15 mL Mouth/Throat BID    nystatin  5 mL Oral 4x Daily    divalproex  125 mg Oral TID    cefTRIAXone (ROCEPHIN) IV  2 g Intravenous Q24H    miconazole   Topical BID    sodium chloride flush  10 mL Intravenous 2 times per day    vitamin D  1,000 Units Oral Daily    levothyroxine  100 mcg Oral Daily    melatonin  6 mg Oral Nightly    simvastatin  40 mg Oral Nightly    sodium chloride flush  10 mL Intravenous 2 times per day       PRN Medications:  oxyCODONE-acetaminophen, mineral oil-hydrophilic petrolatum, sodium chloride flush, acetaminophen, sodium chloride flush, magnesium hydroxide, ondansetron    IV Medications:      I&O  I/O this shift: In: 0   Out: 350 [Urine:350]    Intake/Output Summary (Last 24 hours) at 08/09/18 1426  Last data filed at 08/09/18 1401   Gross per 24 hour   Intake              440 ml   Output             1450 ml   Net            -1010 ml       Allergies:  Patient has no known allergies.     Social History:      Social History     Social History    Marital status:      Spouse name: N/A    Number of children: N/A    Years of education: N/A     Social History Main Topics    Smoking status: Former Smoker    Smokeless tobacco: Never Used    Alcohol use No    Drug use: No    Sexual activity: Not Asked     Other Topics Concern    None     Social History Narrative    None     Place of Residence : nursing home      Family History:   Family History   Problem Relation Age of Onset    Cancer Sister         lung    Cancer Brother          REVIEW OF SYSTEMS:  Unable to complete a reliable ROS secondary to AMS    PHYSICAL EXAM:     Vitals:    BP (!) 160/95   Pulse 82   Temp 97.9 °F (36.6 °C) (Oral)   Resp 16   Ht 6' 1\" (1.854 m)   Wt 163 lb 11.2 oz (74.3 kg)   SpO2 96%   BMI 21.60 kg/m²     Gen: elderly male in NAD, awake, alert    HEENT: normocephalic, atraumatic, PERRLA, EOMI,   Neck: no LAD, no JVD,   Lungs: Bilaterally clear to auscultation; diminished   Heart:  Irregular rate and rhythm, distant heart tones, no murmurs/rubs/gallops appreciated   Abdomen: normoactive bowel sounds, soft, TTP RLQ, non-distended   Extremities: no clubbing, cyanosis or edema   Skin: warm, dry, chronic vascular changes b/l LEs   Neuro: awake, alert, oriented to person and place       DATA:      CBC with Differential:    Lab Results   Component Value Date    WBC 5.3 08/09/2018    RBC 3.75 08/09/2018    HGB 10.0 08/09/2018    HCT 31.7 08/09/2018    PLT 92 08/09/2018    MCV 84.5 08/09/2018    MCH 26.7 08/09/2018    MCHC 31.5 08/09/2018    RDW 18.5 08/09/2018    NRBC 0.9 08/07/2018    SEGSPCT 65 10/17/2013    METASPCT 1.7 08/09/2018    LYMPHOPCT 20.9 08/09/2018    MONOPCT 13.0 08/09/2018    MYELOPCT 1.7 08/09/2018    BASOPCT 0.6 08/09/2018    MONOSABS 0.69 08/09/2018    LYMPHSABS 1.11 08/09/2018    EOSABS 0.23 08/09/2018    BASOSABS 0.00 08/09/2018     CMP:    Lab Results   Component Value Date     08/09/2018    K 3.8 08/09/2018     08/09/2018    CO2 21 08/09/2018    BUN 24 08/09/2018    CREATININE 1.1 08/09/2018    GFRAA >60 08/09/2018    LABGLOM >60 08/09/2018    GLUCOSE 85 08/09/2018    PROT 5.7 08/08/2018    LABALBU 2.6 08/08/2018    CALCIUM 8.5 08/09/2018    BILITOT 0.3 08/08/2018    ALKPHOS 76 08/08/2018    AST 19 08/08/2018    ALT 12 08/08/2018 cerebral infarction    Pacemaker    Hematuria    Severe protein-calorie malnutrition (HCC)    Sepsis ([facility] [address]    Acute cystitis without hematuria    E coli bacteremia    Severe protein-energy malnutrition ([facility] [address]    Palliative care by specialist         Assessment:/Plan: This is a 80 y.o. male admitted with altered mental status and hematuria. Found to have urosepsis and septic shock requiring transfer to MICU. The patient's condition has improved and he was transferred out of the ICU to continue IV antibiotics. 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 Palliative medicine has been consult for goals of care and family support. · Limited code  · Continue current medical care with focus on quality of life   · Goal per family is transition to [facility] for continued abx tx  · Failed swallow evaluation; patient will need source for nutrition; cor grupo vs peg. Medical team to determine best option to present to family  · Palliative medicine will continue to follow the patient's progress and discuss goals of care  ·       Thank you for allowing us to participate in the care of this patient. Atif Way APRN-CNP, ACHPN    Time in minutes: 15    More than 50% of this interaction was related to counseling or information given regarding goals of care, code status and symptom management.

## 2018-08-09 NOTE — PROGRESS NOTES
Power midline Placement 8/9/2018    Product number: [id number]   Lot Number: [id number]      Ultrasound: yes   R Basilic::\"R Brachial\",\"L Brachial\"}      Upper Arm Circumference: 35m    Size: 4..5cm    Exposed Length: 1cm    Internal Length: 14cm   Cut: 0   Vein Measurement: 0.5cm    [place]  8/9/2018  1:15 PM

## 2018-08-09 NOTE — PROGRESS NOTES
Component Value Date    WBC 5.3 08/09/2018    RBC 3.75 08/09/2018    HGB 10.0 08/09/2018    HCT 31.7 08/09/2018    PLT 92 08/09/2018    MCV 84.5 08/09/2018    MCH 26.7 08/09/2018    MCHC 31.5 08/09/2018    RDW 18.5 08/09/2018    NRBC 0.9 08/07/2018    SEGSPCT 65 10/17/2013    METASPCT 1.7 08/09/2018    LYMPHOPCT 20.9 08/09/2018    MONOPCT 13.0 08/09/2018    MYELOPCT 1.7 08/09/2018    BASOPCT 0.6 08/09/2018    MONOSABS 0.69 08/09/2018    LYMPHSABS 1.11 08/09/2018    EOSABS 0.23 08/09/2018    BASOSABS 0.00 08/09/2018     CMP:    Lab Results   Component Value Date     08/09/2018    K 3.8 08/09/2018     08/09/2018    CO2 21 08/09/2018    BUN 24 08/09/2018    CREATININE 1.1 08/09/2018    GFRAA >60 08/09/2018    LABGLOM >60 08/09/2018    GLUCOSE 85 08/09/2018    PROT 5.7 08/08/2018    LABALBU 2.6 08/08/2018    CALCIUM 8.5 08/09/2018    BILITOT 0.3 08/08/2018    ALKPHOS 76 08/08/2018    AST 19 08/08/2018    ALT 12 08/08/2018     PT/INR:    Lab Results   Component Value Date    PROTIME 21.6 08/09/2018    INR 1.9 08/09/2018     Warfarin PT/INR:  No components found for: Shae Altman  Last 3 Troponin:    Lab Results   Component Value Date    TROPONINI 0.12 08/06/2018    TROPONINI 0.13 08/05/2018    TROPONINI 0.02 06/14/2018     U/A:    Lab Results   Component Value Date    COLORU RED 08/04/2018    PROTEINU 100 08/04/2018    PHUR 7.0 08/04/2018    WBCUA >20 08/04/2018    RBCUA PACKED 08/04/2018    RBCUA NONE 03/11/2014    BACTERIA MANY 08/04/2018    CLARITYU CLOUDY 08/04/2018    SPECGRAV 1.015 08/04/2018    LEUKOCYTESUR LARGE 08/04/2018    UROBILINOGEN 1.0 08/04/2018    BILIRUBINUR SMALL 08/04/2018    BLOODU LARGE 08/04/2018    GLUCOSEU Negative 08/04/2018     HgBA1c:    Lab Results   Component Value Date    LABA1C 6.9 07/03/2018     TSH:    Lab Results   Component Value Date    TSH 8.170 08/08/2018     CXR: 8/818   Since examination dated 08/05/2018 at 15:13, slightly   improved hazy bilateral lower lobe airspace opacities, left worse than   right. Recommend clinical correlation.          Resident's Assessment and Plan     Fay Gilman is a 80 y.o. male  with PMHx of A.fib with RVR, sick sinus syndrome status post pacemaker placement in 03/2018, HTN, HLD, DM Type II, Hypothyroidism, s/p Left hip ORIF 03/2018, CKD Stage III, OA, Hx of Prostate ca with recurrent hematuria, and BPH    # Acute Encephalopathy, resolved  - 2/2 sepsis VS underlying Dementia  - currently awake, oriented x3, cooperative, conversant   - on depakote  - Seroquel discontinued    # Septic Shock, resolved  - most probably Urosepsis 2/2 recurrent UTI from chronic catheterization  - wbc: 5.3 (N)  - lactic acid:  1.7 (N)   - urine cx: (+) E. coli  - blood cx 2/2: (+) gram negative rods-- E.coli   - no episodes of fever   - Vanc and Zosyn discontinued  - started on ceftriaxone d4  - off of pressors    # GNR Bacteremia, improved   - 2/2 from CAUTI   - blood cx 2/2: (+) gram negative rods--p E.coli  - Vanc and Zosyn discontinued   - on ceftriaxone 2g daily d4, plan to continue for 2 weeks, 10 more days    # Catheter associated UTI, improved   - 2/2 chronic redman catheter placement for urinary retention  - catheter change monthly, lastly (8/3/2018)  - urine cx: E.coli  - on ceftriaxone   - ID and uro following      # Chronic Catheter use  - (+) bleeding per urethral meatus  - off of coumadin  - latest INR 2.5  - reconsulted urologist, awaiting input    # Afib in RVR, stable   - 2/2 sepsis VS dehydration  - currently NSR, tachycardic at time 110-120  - Coumadin on hold  - on lopressor 25 mg/tab BID PO  - started on aspirin  - monitor HR   - monitor electrolytes and replace when needed    # Hematuria, resolved  -2/2 hx of bladder CA VS UTI  - FC draining of clear urine  - Coumadin on hold  - Latest INR 1.9, PT 21.6  - Uro following  - will resume antiplatelet---- aspirin    #RENUKA on CKD G2, with proteinuria  - baseline crea 1.3  - latest crea 1.1  - I/O 600

## 2018-08-10 LAB
BLOOD CULTURE, ROUTINE: NORMAL
CULTURE, BLOOD 2: NORMAL

## 2018-08-10 NOTE — DISCHARGE SUMMARY
tachycardia, and tachypnea. Labs, were significant for Lactic acidosis-6, Cr-1.3 and a BUN of 24. CBC was essentially normal and INR-2.2. EKG showed evidence of Afib with RVR. CRX showed infiltrates R>L but clinically patient has no signs of HF or ssx concerning for pneumonia. UA positive for gross hematuria and pyuria, for which he was given cefepime. He developed afib RVR in the ED, improved with hydration and cardizem bolus. Brief summary of the patient course: On admission, patient presented with fever, tachycardia, tachypnea and gross hematuria. Repeat labs showed leukocytosis and elevated lactic acid, procalcitonin, ProBNP, creatinine levels while hgb was low. Work up for sepsis was initiated. CXR revealed pulmonary infiltrates with impression of CHF VS Pneumonia, while UA showed urinary tract infection. IFC catheter was changed. Patiently was initially started on Cefepime but was shifted to Vancomycin and Zosyn. Coumadin was put on hold due to persistent hematuria. ID and urology were consulted. Additionally cultures for blood, urine and respiratory were sent. On the floor, patient showed persistent hypotension and altered mentation thus was transferred to MICU. Blood cultures revealed to be (+) for gram negative bruno while urine culture showed (+) E.Coli. Patient was given IV pressors while IV antibiotics changed to Ceftriaxone. Day 3 of hospital admission, patient presented with stable VS off of pressors, improved mentation, IFC draining serosanguinous urine, creatinine levels improving. He was then transferred back to the floor. Overnight patient developed 1 episode of Afib in RVR which was controlled with 1 dose of digoxin. Patient was then started on Lopressor 25 mg/tab BID. No other episodes were noted during his stay. Patient showed improvement in mentation, clear urine and creatinine at baseline during discharge.  He was discharged going to [facility], with IV antibiotics given for 10 more days and with scheduled follow up to ID. Discharge medication:   Darian Canales   Home Medication Instructions QGT:[id number]    Printed on:08/10/18 8039   Medication Information                      acetaminophen (TYLENOL) 325 MG tablet  Take 650 mg by mouth every 4 hours as needed for Pain or Fever             cefTRIAXone (ROCEPHIN) 500 MG injection  Infuse 2 g intravenously every 24 hours for 10 days             Cholecalciferol (VITAMIN D3) 1000 units CAPS  Take 1 capsule by mouth daily             divalproex (DEPAKOTE) 125 MG DR tablet  Take 125 mg by mouth 3 times daily             docusate sodium (COLACE) 100 MG capsule  Take 100 mg by mouth 2 times daily             furosemide (LASIX) 40 MG tablet  Take 40 mg by mouth daily             levothyroxine (SYNTHROID) 100 MCG tablet  Take 100 mcg by mouth Daily. melatonin 5 MG TABS tablet  Take 5 mg by mouth nightly             Mirabegron ER (MYRBETRIQ) 25 MG TB24  Take 1 tablet by mouth daily             potassium chloride (MICRO-K) 10 MEQ extended release capsule  Take 20 mEq by mouth daily              simvastatin (ZOCOR) 40 MG tablet  Take 40 mg by mouth nightly.              warfarin (COUMADIN) 4 MG tablet  Take 3 mg by mouth Five times weekly Monday - friday             warfarin (COUMADIN) 5 MG tablet  Take 5 mg by mouth Twice a Week Sat and Sun ONLY                    Medication List      START taking these medications    cefTRIAXone 500 MG injection  Commonly known as:  ROCEPHIN  Infuse 2 g intravenously every 24 hours for 10 days        CONTINUE taking these medications    acetaminophen 325 MG tablet  Commonly known as:  TYLENOL     DEPAKOTE 125 MG DR tablet  Generic drug:  divalproex     docusate sodium 100 MG capsule  Commonly known as:  COLACE     furosemide 40 MG tablet  Commonly known as:  LASIX     levothyroxine 100 MCG tablet  Commonly known as:  SYNTHROID     melatonin 5 MG Tabs tablet     Mirabegron ER 25 MG Tb24  Commonly known as: MYRBETRIQ  Take 1 tablet by mouth daily     potassium chloride 10 MEQ extended release capsule  Commonly known as:  MICRO-K     simvastatin 40 MG tablet  Commonly known as:  ZOCOR     Vitamin D3 1000 units Caps     * warfarin 4 MG tablet  Commonly known as:  COUMADIN     * warfarin 5 MG tablet  Commonly known as:  COUMADIN        * This list has 2 medication(s) that are the same as other medications prescribed for you. Read the directions carefully, and ask your doctor or other care provider to review them with you.             STOP taking these medications    atenolol 50 MG tablet  Commonly known as:  TENORMIN     finasteride 5 MG tablet  Commonly known as:  PROSCAR     FLOMAX 0.4 MG capsule  Generic drug:  tamsulosin     glipiZIDE 5 MG extended release tablet  Commonly known as:  GLUCOTROL XL     TRADJENTA 5 MG tablet  Generic drug:  linagliptin           Where to Get Your Medications      Information about where to get these medications is not yet available    Ask your nurse or doctor about these medications  · cefTRIAXone 500 MG injection         Discharge Instructions:   Diet: thickened diet   Activity: as tolerated  Be compliant with medication      Disposition: LTAC      Attending Physician: Dr. Rjaendra Galvez MD PGY1   8/10/2018 10:49 AM

## 2018-08-11 LAB — ZINC: 25 UG/DL (ref 60–120)

## 2018-08-25 ENCOUNTER — HOSPITAL ENCOUNTER (OUTPATIENT)
Age: 83
Discharge: HOME | End: 2018-08-27
Payer: MEDICARE

## 2018-08-25 LAB
ANION GAP SERPL CALCULATED.3IONS-SCNC: 15 MMOL/L (ref 7–16)
BUN BLDV-MCNC: 34 MG/DL (ref 8–23)
CALCIUM SERPL-MCNC: 9 MG/DL (ref 8.6–10.2)
CHLORIDE BLD-SCNC: 99 MMOL/L (ref 98–107)
CO2: 28 MMOL/L (ref 22–29)
CREAT SERPL-MCNC: 1 MG/DL (ref 0.7–1.2)
GFR AFRICAN AMERICAN: >60
GFR NON-AFRICAN AMERICAN: >60 ML/MIN/1.73
GLUCOSE BLD-MCNC: 391 MG/DL (ref 74–109)
HBA1C MFR BLD: 7.1 % (ref 4–5.6)
HCT VFR BLD CALC: 33.1 % (ref 37–54)
HEMOGLOBIN: 10.1 G/DL (ref 12.5–16.5)
INR BLD: 1.5
MCH RBC QN AUTO: 26.9 PG (ref 26–35)
MCHC RBC AUTO-ENTMCNC: 30.5 % (ref 32–34.5)
MCV RBC AUTO: 88 FL (ref 80–99.9)
PDW BLD-RTO: 17.5 FL (ref 11.5–15)
PLATELET # BLD: 283 E9/L (ref 130–450)
PMV BLD AUTO: 13 FL (ref 7–12)
POTASSIUM SERPL-SCNC: 3.9 MMOL/L (ref 3.5–5)
PROTHROMBIN TIME: 17.4 SEC (ref 9.3–12.4)
RBC # BLD: 3.76 E12/L (ref 3.8–5.8)
SODIUM BLD-SCNC: 142 MMOL/L (ref 132–146)
TSH SERPL DL<=0.05 MIU/L-ACNC: 2.25 UIU/ML (ref 0.27–4.2)
WBC # BLD: 7.6 E9/L (ref 4.5–11.5)

## 2018-08-25 PROCEDURE — 85610 PROTHROMBIN TIME: CPT

## 2018-08-25 PROCEDURE — 80048 BASIC METABOLIC PNL TOTAL CA: CPT

## 2018-08-25 PROCEDURE — 84443 ASSAY THYROID STIM HORMONE: CPT

## 2018-08-25 PROCEDURE — 36415 COLL VENOUS BLD VENIPUNCTURE: CPT

## 2018-08-25 PROCEDURE — 85027 COMPLETE CBC AUTOMATED: CPT

## 2018-08-25 PROCEDURE — 83036 HEMOGLOBIN GLYCOSYLATED A1C: CPT

## 2018-08-27 ENCOUNTER — HOSPITAL ENCOUNTER (OUTPATIENT)
Age: 83
Discharge: HOME | End: 2018-08-29
Payer: MEDICARE

## 2018-08-27 ENCOUNTER — CLINICAL DOCUMENTATION (OUTPATIENT)
Dept: FAMILY MEDICINE CLINIC | Age: 83
End: 2018-08-27

## 2018-08-27 LAB
INR BLD: 2.2
PROTHROMBIN TIME: 25.4 SEC (ref 9.3–12.4)

## 2018-08-27 PROCEDURE — 85610 PROTHROMBIN TIME: CPT

## 2018-08-27 PROCEDURE — 36415 COLL VENOUS BLD VENIPUNCTURE: CPT

## 2018-08-27 NOTE — PROGRESS NOTES
Medical/Surgical Hx;  Reviewed with patient  Past Medical History             Diagnosis Date    A-fib [facility])      Arthritis      CAD (coronary artery disease)      Cancer ([facility]      Depression      Hyperlipidemia      Hypertension      Thyroid disease           Past Surgical History         Past Surgical History:   Procedure Laterality Date    HERNIA REPAIR        KNEE ARTHROSCOPY Left 7-1-13     debridement and medial menisectomy    PACEMAKER PLACEMENT   03/27/2018     Single chamber pacemaker    [place] FEMORAL FX, OPEN [place] Left 3/22/2018     LEFT HIP OPEN REDUCTION INTERNAL FIXATION performed by Manohar Roman DO at [address]        TONSILLECTOMY                Past Family Hx:  Reviewed with patient  Family History   No family history on file.        Social Hx:  Reviewed with patient       Social History   Substance Use Topics    Smoking status: Former Smoker    Smokeless tobacco: Never Used    Alcohol use No         OBJECTIVE  There were no vitals taken for this visit.     Problem List:  Noah Stephen  does not have any pertinent problems on file.     PHYS EX:  Physical Exam   Constitutional: He is oriented to person, place, and time. He appears well-developed and well-nourished. No distress. HENT:   Head: Normocephalic and atraumatic. Right Ear: External ear normal.   Left Ear: External ear normal.   Nose: Nose normal.   Mouth/Throat: Oropharynx is clear and moist. No oropharyngeal exudate. Eyes: Right eye exhibits no discharge. Left eye exhibits no discharge. No scleral icterus. Neck: Normal range of motion. Neck supple. No JVD present. No tracheal deviation present. No thyromegaly present. Cardiovascular: Normal rate and normal heart sounds. An irregularly irregular rhythm present. No extrasystoles are present. PMI is not displaced. Exam reveals no gallop, no distant heart sounds and no friction rub. No murmur heard.    No systolic murmur is present    No diastolic

## 2018-08-31 ENCOUNTER — HOSPITAL ENCOUNTER (OUTPATIENT)
Age: 83
Discharge: HOME | End: 2018-09-02
Payer: MEDICARE

## 2018-08-31 LAB
INR BLD: 2.5
PROTHROMBIN TIME: 28.7 SEC (ref 9.3–12.4)

## 2018-08-31 PROCEDURE — 36415 COLL VENOUS BLD VENIPUNCTURE: CPT

## 2018-08-31 PROCEDURE — 85610 PROTHROMBIN TIME: CPT

## 2018-09-07 ENCOUNTER — HOSPITAL ENCOUNTER (OUTPATIENT)
Age: 83
Discharge: HOME | End: 2018-09-09
Payer: MEDICARE

## 2018-09-07 LAB
INR BLD: 2.9
PROTHROMBIN TIME: 32.6 SEC (ref 9.3–12.4)

## 2018-09-07 PROCEDURE — 36415 COLL VENOUS BLD VENIPUNCTURE: CPT

## 2018-09-07 PROCEDURE — 85610 PROTHROMBIN TIME: CPT

## 2018-09-12 ENCOUNTER — CLINICAL DOCUMENTATION (OUTPATIENT)
Dept: FAMILY MEDICINE CLINIC | Age: 83
End: 2018-09-12

## 2018-09-12 NOTE — PROGRESS NOTES
([facility]      Arthritis      CAD (coronary artery disease)      Cancer (HCC)      Depression      Hyperlipidemia      Hypertension      Thyroid disease           Past Surgical History         Past Surgical History:   Procedure Laterality Date    HERNIA REPAIR        KNEE ARTHROSCOPY Left 7-1-13     debridement and medial menisectomy    PACEMAKER PLACEMENT   03/27/2018     Single chamber pacemaker    [place] FEMORAL FX, OPEN [place] Left 3/22/2018     LEFT HIP OPEN REDUCTION INTERNAL FIXATION performed by Nica Echeverria DO at [address]        TONSILLECTOMY                Past Family Hx:  Reviewed with patient  Family History   No family history on file.        Social Hx:  Reviewed with patient       Social History   Substance Use Topics    Smoking status: Former Smoker    Smokeless tobacco: Never Used    Alcohol use No         OBJECTIVE  There were no vitals taken for this visit.     Problem List:  Florinda Snell  does not have any pertinent problems on file.     PHYS EX:  Physical Exam   Constitutional: He is oriented to person, place, and time. He appears well-developed and well-nourished. No distress. HENT:   Head: Normocephalic and atraumatic. Right Ear: External ear normal.   Left Ear: External ear normal.   Nose: Nose normal.   Mouth/Throat: Oropharynx is clear and moist. No oropharyngeal exudate. Eyes: Right eye exhibits no discharge. Left eye exhibits no discharge. No scleral icterus. Neck: Normal range of motion. Neck supple. No JVD present. No tracheal deviation present. No thyromegaly present. Cardiovascular: Normal rate and normal heart sounds. An irregularly irregular rhythm present. No extrasystoles are present. PMI is not displaced. Exam reveals no gallop, no distant heart sounds and no friction rub. No murmur heard. No systolic murmur is present    No diastolic murmur is present   Pulmonary/Chest: Effort normal and breath sounds normal. No stridor.  No respiratory (renal failure) [N19]   6/29/2013 - Present   Leukocytosis [D72.829]   6/29/2013 - Present   Essential hypertension [I10]   6/29/2013 - Present   Overview Signed 3/23/2018  2:31 PM by Erby Libman, DO   Overview:   Essential hypertension   HLD (hyperlipidemia) [E78.5]   6/29/2013 - Present   History of gout [Z87.39]   6/29/2013 - Present   Hypothyroidism [E03.9]   6/29/2013 - Present   Overview Signed 3/23/2018  2:31 PM by Erby Libman, DO   Overview:   Hypothyroidism   GERD (gastroesophageal reflux disease) [K21.9]   6/29/2013 - Present   Hip fracture ([facility] [address] [address] [S72.002A]   3/21/2018 - Present   Coronary atherosclerosis [I25.10]   3/23/2018 - Present   Overview Signed 3/23/2018  2:31 PM by Erby Libman, DO   Overview:   Coronary artery disease   Occlusion and stenosis of carotid artery with cerebral infarction [I63.239]   3/23/2018 - Present   Overview Signed 3/23/2018  2:31 PM by Erby Libman, DO   Overview:   20-39% per study today   Pacemaker [Z95.0]   3/27/2018 - Present   Hematuria [R31.9]   6/29/2018 - Present   Severe protein-calorie malnutrition ([address] [E43]   6/30/2018 - Present   Sepsis ([address] [A41.9]   8/4/2018 - Present   Orders Placed      None   Medication Changes        None      Medication List   Visit Diagnoses         Hip fracture ([address]      Atrial fibrillation with RVR ([address]      Chronic pain of left knee      Essential hypertension      Problem List

## 2018-09-14 ENCOUNTER — HOSPITAL ENCOUNTER (OUTPATIENT)
Age: 83
Discharge: HOME | End: 2018-09-16
Payer: MEDICARE

## 2018-09-14 LAB
INR BLD: 7.9
PROTHROMBIN TIME: 88.3 SEC (ref 9.3–12.4)

## 2018-09-14 PROCEDURE — 85610 PROTHROMBIN TIME: CPT

## 2018-09-14 PROCEDURE — 36415 COLL VENOUS BLD VENIPUNCTURE: CPT

## 2018-09-15 ENCOUNTER — HOSPITAL ENCOUNTER (OUTPATIENT)
Age: 83
Discharge: HOME | End: 2018-09-17
Payer: MEDICARE

## 2018-09-15 LAB
INR BLD: 8.9
PROTHROMBIN TIME: 99 SEC (ref 9.3–12.4)

## 2018-09-15 PROCEDURE — 85610 PROTHROMBIN TIME: CPT

## 2018-09-15 PROCEDURE — 36415 COLL VENOUS BLD VENIPUNCTURE: CPT

## 2018-09-16 ENCOUNTER — HOSPITAL ENCOUNTER (OUTPATIENT)
Age: 83
Discharge: HOME | End: 2018-09-18
Payer: MEDICARE

## 2018-09-16 LAB
INR BLD: 8.8
PROTHROMBIN TIME: 96.3 SEC (ref 9.3–12.4)

## 2018-09-16 PROCEDURE — 87088 URINE BACTERIA CULTURE: CPT

## 2018-09-16 PROCEDURE — 87077 CULTURE AEROBIC IDENTIFY: CPT

## 2018-09-16 PROCEDURE — 87186 SC STD MICRODIL/AGAR DIL: CPT

## 2018-09-16 PROCEDURE — 81001 URINALYSIS AUTO W/SCOPE: CPT

## 2018-09-16 PROCEDURE — 36415 COLL VENOUS BLD VENIPUNCTURE: CPT

## 2018-09-16 PROCEDURE — 85610 PROTHROMBIN TIME: CPT

## 2018-09-17 ENCOUNTER — HOSPITAL ENCOUNTER (OUTPATIENT)
Age: 83
Discharge: HOME | End: 2018-09-19
Payer: MEDICARE

## 2018-09-17 LAB
BACTERIA: ABNORMAL /HPF
BILIRUBIN URINE: NEGATIVE
BLOOD, URINE: ABNORMAL
CLARITY: ABNORMAL
COLOR: YELLOW
GLUCOSE URINE: >=1000 MG/DL
KETONES, URINE: NEGATIVE MG/DL
LEUKOCYTE ESTERASE, URINE: ABNORMAL
NITRITE, URINE: NEGATIVE
PH UA: 7 (ref 5–9)
PROTEIN UA: 100 MG/DL
RBC UA: ABNORMAL /HPF (ref 0–2)
SPECIFIC GRAVITY UA: 1.02 (ref 1–1.03)
UROBILINOGEN, URINE: 0.2 E.U./DL
WBC UA: >20 /HPF (ref 0–5)

## 2018-09-17 PROCEDURE — 81001 URINALYSIS AUTO W/SCOPE: CPT

## 2018-09-17 PROCEDURE — 87077 CULTURE AEROBIC IDENTIFY: CPT

## 2018-09-17 PROCEDURE — 87186 SC STD MICRODIL/AGAR DIL: CPT

## 2018-09-17 PROCEDURE — 87088 URINE BACTERIA CULTURE: CPT

## 2018-09-19 LAB
ORGANISM: ABNORMAL
URINE CULTURE, ROUTINE: ABNORMAL
URINE CULTURE, ROUTINE: ABNORMAL

## 2018-12-04 NOTE — PROGRESS NOTES
Patient supply of home medication, brought to pharmacy for storage, has been destroyed in accordance with [facility].   Patient   Alexandra Wen, PharmD 2018 3:52 PM

## 2022-11-07 NOTE — CONSULTS
[facility] INPATIENT CONSULTATION RECORD FOR  6/30/2018      [facility]  [address]. [place], [address] [facility]  [phone]        REASON FOR CONSULTATION:  Hematuria and penile pain      HISTORY OF PRESENT ILLNESS:      The patient is a 80 y.o. male patient who presents with  Multiple complaints. Patient was seen by me in April after he developed urinary retention following hip surgery and placement of a pacemaker. Since that time he developed overall functional decline. Patient had been following with Dr. Matthew Holder from urologic standpoint related to BPH and bladder cancer. Patient had his catheter removed and was able to spontaneously void. He was seen back one additional time in the office and felt he was voiding well. He did have some incomplete bladder emptying with elevated post void residual. After that point he developed episode of abdominal pain due to retention. It is becoming increasingly difficult for his family to care for his needs. He was unable to come to the office and was taken to [facility] emergency department with a catheter was placed. Since that time he's had several return visits to the emergency department related to hematuria even his Coumadin use an indwelling catheter. Yesterday his family expressed concerns with his ongoing care needs and his difficulty with managing him at home. This morning he is evaluated. There is no family present. He reports no complaints. He states he's tolerating the catheter well without any penile pain. Since his admission his urine has been clear    His catheter was changed in the emergency department.       Past Medical History:   Diagnosis Date    A-fib [facility])     Arthritis     CAD (coronary artery disease)     Cancer ([address]     Depression     Hyperlipidemia     Hypertension     Thyroid disease          Past Surgical History:   Procedure Laterality Date    HERNIA REPAIR      KNEE ARTHROSCOPY Left 7-1-13    debridement and medial Non-occludable by OCT. Should resolve with cataract surgery. menisectomy    PACEMAKER PLACEMENT  03/27/2018    Single chamber pacemaker    [place] FEMORAL FX, OPEN [place] Left 3/22/2018    LEFT HIP OPEN REDUCTION INTERNAL FIXATION performed by Jo-Ann Arzola DO at [address] [place] [address]         Medications Prior to Admission:    Prescriptions Prior to Admission: Mirabegron ER (MYRBETRIQ) 25 MG TB24, Take 1 tablet by mouth daily  nystatin (MYCOSTATIN) 442396 UNIT/GM cream, Apply topically 2 times daily. acetaminophen (TYLENOL) 325 MG tablet, Take 650 mg by mouth every 6 hours as needed for Pain  docusate sodium (COLACE) 100 MG capsule, Take 100 mg by mouth 2 times daily  potassium chloride (MICRO-K) 10 MEQ extended release capsule, Take 10 mEq by mouth 2 times daily  atenolol (TENORMIN) 50 MG tablet, Take 0.5 tablets by mouth nightly  furosemide (LASIX) 40 MG tablet, Take 40 mg by mouth daily  glipiZIDE (GLUCOTROL XL) 2.5 MG extended release tablet, Take 5 mg by mouth daily   Cholecalciferol (VITAMIN D3) 1000 units CAPS, Take 1 capsule by mouth daily  linagliptin (TRADJENTA) 5 MG tablet, Take 5 mg by mouth nightly  finasteride (PROSCAR) 5 MG tablet, Take 5 mg by mouth daily. levothyroxine (SYNTHROID) 100 MCG tablet, Take 100 mcg by mouth Daily. simvastatin (ZOCOR) 40 MG tablet, Take 40 mg by mouth nightly. tamsulosin (FLOMAX) 0.4 MG capsule, Take 0.4 mg by mouth daily. warfarin (COUMADIN) 5 MG tablet, Take 3 mg by mouth daily at 1800 5 mg- Sunday, Tuesday, and Thursday 4 mg- Monday, Wed, Friday, Saturday. Allergies:    Patient has no known allergies. Social History:    reports that he has quit smoking. He has never used smokeless tobacco. He reports that he does not drink alcohol or use drugs. Family History:   Non-contributory to this Urological problem  family history is not on file.     REVIEW OF SYSTEMS:  Respiratory: negative for cough and hemoptysis  Cardiovascular: negative for chest pain and dyspnea  Gastrointestinal: negative for

## (undated) DEVICE — INTENDED FOR TISSUE SEPARATION, AND OTHER PROCEDURES THAT REQUIRE A SHARP SURGICAL BLADE TO PUNCTURE OR CUT.: Brand: BARD-PARKER ® STAINLESS STEEL BLADES

## (undated) DEVICE — PENCIL ES L3M BTTN SWCH HOLSTER W/ BLDE ELECTRD EDGE

## (undated) DEVICE — COVER,TABLE,44X90,STERILE: Brand: MEDLINE

## (undated) DEVICE — SOLUTION IV IRRIG POUR BRL 0.9% SODIUM CHL 2F7124

## (undated) DEVICE — TOWEL,OR,DSP,ST,BLUE,STD,6/PK,12PK/CS: Brand: MEDLINE

## (undated) DEVICE — GAUZE,SPONGE,4"X4",16PLY,STRL,LF,10/TRAY: Brand: MEDLINE

## (undated) DEVICE — PADDING CAST W4INXL4YD ST COT COHESIVE HND TEARABLE SPEC

## (undated) DEVICE — VASELINE PETROLATUM GAUZE STRIP: Brand: VASELINE

## (undated) DEVICE — Device

## (undated) DEVICE — Z DISCONTINUED NO SUB IDED GLOVE SURG BEAD CUF 8 STD PF WHT STRL TRIUMPH LT LTX

## (undated) DEVICE — BIT DRL L500MM DIA6X9MM CANN STP L QUIK CPL FOR DH DC TFN

## (undated) DEVICE — NDLCTR: FOAM/MAG 40CT 64/CS: Brand: MEDICAL ACTION INDUSTRIES

## (undated) DEVICE — SPONGE,LAP,12"X12",XR,ST,5/PK,40PK/CS: Brand: MEDLINE

## (undated) DEVICE — BIT DRL L300MM DIA10MM CANN TAPR L QUIK CPL FOR DH DC TFN

## (undated) DEVICE — SMARTGOWN BREATHABLE SPECIALTY GOWN: Brand: CONVERTORS

## (undated) DEVICE — BIT DRL L L266MM DIA16MM FEM FLX CANN QUIK CPL

## (undated) DEVICE — SET MAJOR INSTR ORTHO

## (undated) DEVICE — BANDAGE COMPR W4INXL5YD WHT BGE POLY COT M E WRP WV HK AND

## (undated) DEVICE — SYRINGE BLB 50CC IRRIG PLIABLE FNGR FLNG GRAD FLSK DISP

## (undated) DEVICE — Z DISCONTINUED USE 2275686 GLOVE SURG SZ 8 L12IN FNGR THK13MIL WHT ISOLEX POLYISOPRENE

## (undated) DEVICE — PACK SURG BASIC I LF

## (undated) DEVICE — BANDAGE,SELF ADHRNT,COFLEX,4"X5YD,STRL: Brand: COLABEL

## (undated) DEVICE — MEDI-VAC YANKAUER SUCTION HANDLE: Brand: CARDINAL HEALTH

## (undated) DEVICE — TUBING, SUCTION, 1/4" X 10', STRAIGHT: Brand: MEDLINE

## (undated) DEVICE — GUIDEWIRE ORTH L400MM DIA3.2MM FOR TFN

## (undated) DEVICE — PAD,ABDOMINAL,5"X9",ST,LF,25/BX: Brand: MEDLINE INDUSTRIES, INC.

## (undated) DEVICE — 6617 IOBAN II PATIENT ISOLATION DRAPE 5/BX,4BX/CS: Brand: STERI-DRAPE™ IOBAN™ 2

## (undated) DEVICE — PAD,ABDOMINAL,8"X10",ST,LF: Brand: MEDLINE

## (undated) DEVICE — ELECTRODE PT RET AD L9FT HI MOIST COND ADH HYDRGEL CORDED

## (undated) DEVICE — READY WET SKIN SCRUB TRAY-LF: Brand: MEDLINE INDUSTRIES, INC.

## (undated) DEVICE — Z DISCONTINUED PER MEDLINE USE 2741942 DRESSING AQUACEL 6 IN ALG W9XL15CM SIL CVR WTRPRF VIR BACT BARR ANTIMIC

## (undated) DEVICE — DOUBLE BASIN SET: Brand: MEDLINE INDUSTRIES, INC.

## (undated) DEVICE — MARKER,SKIN,WI/RULER AND LABELS: Brand: MEDLINE

## (undated) DEVICE — BIT DRL L330MM DIA4.2MM CALIB L100MM ST 3 FLUT QUIK CPL FOR

## (undated) DEVICE — GOWN,SIRUS,NONRNF,SETINSLV,XL,20/CS: Brand: MEDLINE